# Patient Record
Sex: MALE | Race: BLACK OR AFRICAN AMERICAN | NOT HISPANIC OR LATINO | Employment: OTHER | URBAN - METROPOLITAN AREA
[De-identification: names, ages, dates, MRNs, and addresses within clinical notes are randomized per-mention and may not be internally consistent; named-entity substitution may affect disease eponyms.]

---

## 2017-08-04 ENCOUNTER — OFFICE VISIT (OUTPATIENT)
Dept: FAMILY MEDICINE | Facility: CLINIC | Age: 67
End: 2017-08-04
Payer: COMMERCIAL

## 2017-08-04 VITALS
HEIGHT: 71 IN | TEMPERATURE: 98 F | HEART RATE: 116 BPM | WEIGHT: 218.5 LBS | OXYGEN SATURATION: 96 % | BODY MASS INDEX: 30.59 KG/M2 | DIASTOLIC BLOOD PRESSURE: 82 MMHG | SYSTOLIC BLOOD PRESSURE: 124 MMHG

## 2017-08-04 DIAGNOSIS — M75.82 ROTATOR CUFF TENDONITIS, LEFT: Primary | ICD-10-CM

## 2017-08-04 PROCEDURE — 3008F BODY MASS INDEX DOCD: CPT | Mod: S$GLB,,, | Performed by: NURSE PRACTITIONER

## 2017-08-04 PROCEDURE — 99214 OFFICE O/P EST MOD 30 MIN: CPT | Mod: S$GLB,,, | Performed by: NURSE PRACTITIONER

## 2017-08-04 PROCEDURE — 99999 PR PBB SHADOW E&M-EST. PATIENT-LVL III: CPT | Mod: PBBFAC,,, | Performed by: NURSE PRACTITIONER

## 2017-08-04 RX ORDER — NAPROXEN 500 MG/1
500 TABLET ORAL 2 TIMES DAILY WITH MEALS
Qty: 45 TABLET | Refills: 0 | Status: SHIPPED | OUTPATIENT
Start: 2017-08-04 | End: 2018-10-24

## 2017-08-04 NOTE — PROGRESS NOTES
This dictation has been generated using Dragon Dictation some phonetic errors may occur.     Rod was seen today for shoulder pain.    Diagnoses and all orders for this visit:    Rotator cuff tendonitis, left  -     Ambulatory Referral to Physical/Occupational Therapy    Other orders  -     Cancel: Hepatitis C antibody; Future  -     naproxen (NAPROSYN) 500 MG tablet; Take 1 tablet (500 mg total) by mouth 2 (two) times daily with meals.      Rotator cuff tendinitis on the left.  Add physical therapy.  Naprosyn as above.    Return in about 1 month (around 9/4/2017).      ________________________________________________________________  ________________________________________________________________        Chief Complaint   Patient presents with    Shoulder Pain     History of present illness  This 66 y.o. presents today for complaint of left shoulder pain.  Symptoms have been present for about 2 months.  Patient indicates that he went to sit down at home however his wife called out that he was about to set on her glasses.  Patient indicates that he stuck his arm out to catch himself.  Since that time certain movements exacerbate the pain.  Patient indicated left shoulder pain sometimes affects upper and lower arm and radiates to the chest.  He tried Advil without resolution of his symptoms.  Yesterday he picked up a watermelon and experienced sharp pain.  Pain only lasts for a moment.  It can be quite severe.  Review of systems  No fever or chills  No rash  No chest pain shortness of breath dyspnea on exertion  Denies any neck pain    History reviewed. No pertinent past medical history.    Past Surgical History:   Procedure Laterality Date    NO PAST SURGERIES         History reviewed. No pertinent family history.    Social History     Social History    Marital status:      Spouse name: N/A    Number of children: N/A    Years of education: N/A     Social History Main Topics    Smoking status: Never  Smoker    Smokeless tobacco: Never Used    Alcohol use Yes    Drug use: No    Sexual activity: Not Asked     Other Topics Concern    None     Social History Narrative    None       Current Outpatient Prescriptions   Medication Sig Dispense Refill    naproxen (NAPROSYN) 500 MG tablet Take 1 tablet (500 mg total) by mouth 2 (two) times daily with meals. 45 tablet 0     No current facility-administered medications for this visit.        Review of patient's allergies indicates:  No Known Allergies    Physical examination  Vitals Reviewed  Gen. Well-dressed well-nourished no apparent distress  Skin warm dry and intact.  No rashes noted.  HEENT.  TM intact bilateral with normal light reflex.  No mastoid tenderness during percussion.  Nares patent bilateral.  Pharynx is unremarkable.  No maxillary or frontal sinus tenderness when percussed.    Neck is supple without adenopathy  Chest.  Respirations are even unlabored.  Lungs are clear to auscultation.  Cardiac regular rate and rhythm.  No chest wall adenopathy noted.  Neuro. Awake alert oriented x4.  Normal judgment and cognition noted.  Extremities no clubbing cyanosis or edema noted.  Patient does experience some pain with internal rotation against resistance.  Otherwise unlimited range of motion with external rotation and elevation above the head.  Can tilt test is negative.  With palpation no tenderness over the bicep groove or chest wall.  No supraspinatus tenderness or infraspinatus tenderness.  No palpable spasms.     Call or return to clinic prn if these symptoms worsen or fail to improve as anticipated.

## 2017-08-08 ENCOUNTER — CLINICAL SUPPORT (OUTPATIENT)
Dept: REHABILITATION | Facility: HOSPITAL | Age: 67
End: 2017-08-08
Attending: NURSE PRACTITIONER
Payer: COMMERCIAL

## 2017-08-08 DIAGNOSIS — M25.512 LEFT SHOULDER PAIN, UNSPECIFIED CHRONICITY: Primary | ICD-10-CM

## 2017-08-08 PROCEDURE — 97110 THERAPEUTIC EXERCISES: CPT | Mod: PN | Performed by: PHYSICAL THERAPIST

## 2017-08-08 PROCEDURE — 97161 PT EVAL LOW COMPLEX 20 MIN: CPT | Mod: PN | Performed by: PHYSICAL THERAPIST

## 2017-08-08 NOTE — PROGRESS NOTES
TIME RECORD    Date: 08/08/2017    Start Time:  1400  Stop Time:  1455  Initial eval: 30', therapeutic exercise: 15'      OUTPATIENT PHYSICAL THERAPY   PATIENT EVALUATION    Primary Diagnosis:   1. Left shoulder pain, unspecified chronicity       Treatment Diagnosis: shoulder weakness, poor postural awareness, scapula dyskinesia, decreased ROM  PMH: none  Precautions: standard  Prior Therapy: yes  Medications: Rod Victor has a current medication list which includes the following prescription(s): naproxen.  Nutrition:  Normal  Prior Level of Function: Independent  Social History: , retired   Functional Deficits Leading to Referral/Nature of Injury: difficulty with reaching, lifting  Patient Therapy Goals: to improve his pain    Subjective     Rod Victor states onset of L shoulder pain approximately 2 months ago. He was about to sit on his wife's glasses and reached out to grab the bed to catch himself. He is having intermittent shoulder pain worse with lifting, sudden movements, reaching behind his back. Pain is in the front of his shoulder, tightness in his chest, and side of arm to elbow. History of L shoulder pain and upper back pain. He had physical therapy at [facility]. He is doing the exercises they recommended with the rubber band.     Pain:  Location: anterior shoulder and lateral arm  Description: Aching  Activities Which Increase Pain: Lifting and reaching  Activities Which Decrease Pain: ice and exercise  Pain Scale: 0/10 at best 4/10 now  9/10 at worst    Objective     Posture: protracted scapulae with anterior tilt, forward head  Palpation: TTP L upper traps, infraspinatus, pec minor  Sensation: BUE light touch sensation grossly intact all dermatomes  DTRs: BR 2+ B  Range of Motion/Strength:      Shoulder AROM (PROM)  Left  Right    Flexion:    140/155 155/160     Abduction:    140/145 160/164    ER at side    45  49    Functional reaches:  Internal rotation   L5  L2  External  "rotation   C7  T1    * indicates pain with movement, Measured in degrees    Scapula rotation:    30  40     Measured with inclinometer at medial border of scapula with forward elevation    Cervical Spine Active ROM, measured in degrees with inclinometer, * Indicates pain with movement    Flexion:40  Extension: 45  Left Side Bend:30 (compensatory rotation)  Right Side Bend:20  Left Rotation: 45  Right Rotation:50    MMT:    Left  Right    Shoulder:       Flexion:   4-/5  5/5   Abduction:   4-/5  4+/5  External Rotation:  4-/5  4+/5  Internal Rotation:  3+/5  5/5    Elbow:  Flexion:    4/5  5/5   Extension:   4-/5  5/5  Pronation:   4+/5  5/5  Supination:   4/5  5/5    Wrist:  Flexion:   4+/5  4/5  Extension:   5/5  5/5    Finger:  Abduction   4/5  4/5    Flexibility: tightness noted through pectorals and upper traps  Gait: Without AD  Analysis: Assistance independent  Bed Mobility:Independent  Transfers: Independent  Special Tests:   Lift off test: positive L  Neer's: positive L  hawkin's ev: negative  ULTT: positive L median and ulnar  Spurling's: negative    Treatment: Physical Therapist educated patient in a home exercise program and issued handout. Patient demonstrating good understanding of education provided and able to return demonstration of correct technique. Patient given the following exercises: chin tucks 2 x 10, scapula retractions 2 x 10, corner stretch 5 x 10", rows with RTB, B shoulder extension with RTB, shoulder IR/ER with towel and RTB.     Assessment       Initial Assessment: Rod is a 66 year old male referred to physical therapy for medical diagnosis of rotator cuff tendonitis and onset of acute L shoulder pain. Patient has the following impairments upon initial evaluation: cervical spine and L shoulder decreased AROM/PROM, LUE weakness compared to right, poor postural awareness, decreased soft tissue mobility, and pain. Patient to benefit from skilled physical therapy to address above " deficits and maximize functional independence. Patient appears motivated to improve condition and is a good candidate for physical therapy. Patient has verbalized understanding of plan of care and set goals. Patient has no identified cultural, spiritual, or educational needs that would impair learning.     History  Co-morbidities and personal factors that may impact the plan of care Examination  Body Structures and Functions, activity limitations and participation restrictions that may impact the plan of care Clinical Presentation   Decision Making/ Complexity Score   Co-morbidities:       BMI.            Personal Factors:    Body Regions:neck, shoulder    Body Systems:     Musculoskeletal:  weakness, impaired functional mobility, pain, decreased ROM, impaired joint extensibility and impaired muscle length    Neuromuscular:    Activity limitations: lifting      Participation Restrictions: none         stable and uncomplicated.     Low     CMS Impairment/Limitation/Restriction for FOTO Shoulder Survey  Status Limitation G-Code CMS Severity Modifier  Intake 57% 43% Current Status CK - At least 40 percent but less than 60 percent  Predicted 71% 29% Goal Status+ CJ - At least 20 percent but less than 40 percent    Rehab Potiential: good    Short Term Goals (4 Weeks):   1. Patient to report decreased L shoulder pain by 40% or greater with ADL's  2. Patient's cervical spine AROM to improve 25% or greater all planes for improved functional mobility  3. Patient to demonstrate understanding of proper sitting posture and spinal alignment for management of condition  4. Patient's L scapula upward rotation to improve 20% or greater for improved scapulohumeral rhythm   Long Term Goals (8 Weeks):   1. Patient to have decreased subjective report of disability as noted by a score of 30% or less on the FOTO shoulder questionnaire   2. Patient's LUE strength to improve 1/2 muscle grade for improved functional ability to lift    Plan      Certification Period: 08/08/2017 to 10/08/2017  Recommended Treatment Plan: 2 times per week for 6-8 weeks: Manual Therapy, Moist Heat/ Ice, Neuromuscular Re-ed, Patient Education, Therapeutic Activites, Therapeutic Exercise and Other modalities prn  Other Recommendations: Patient to start home program and told to contact therapist if there are any questions or concerns that arise prior to next scheduled visit      Therapist: Moni Hamm, PT

## 2017-08-09 NOTE — PLAN OF CARE
TIME RECORD    Date: 08/08/2017    Start Time:  1400  Stop Time:  1455  Initial eval: 30', therapeutic exercise: 15'      OUTPATIENT PHYSICAL THERAPY   PATIENT EVALUATION    Primary Diagnosis:   1. Left shoulder pain, unspecified chronicity       Treatment Diagnosis: shoulder weakness, poor postural awareness, scapula dyskinesia, decreased ROM  PMH: none  Precautions: standard  Prior Therapy: yes  Medications: Rod Victor has a current medication list which includes the following prescription(s): naproxen.  Nutrition:  Normal  Prior Level of Function: Independent  Social History: , retired   Functional Deficits Leading to Referral/Nature of Injury: difficulty with reaching, lifting  Patient Therapy Goals: to improve his pain    Subjective     Rod Victor states onset of L shoulder pain approximately 2 months ago. He was about to sit on his wife's glasses and reached out to grab the bed to catch himself. He is having intermittent shoulder pain worse with lifting, sudden movements, reaching behind his back. Pain is in the front of his shoulder, tightness in his chest, and side of arm to elbow. History of L shoulder pain and upper back pain. He had physical therapy at [facility]. He is doing the exercises they recommended with the rubber band.     Pain:  Location: anterior shoulder and lateral arm  Description: Aching  Activities Which Increase Pain: Lifting and reaching  Activities Which Decrease Pain: ice and exercise  Pain Scale: 0/10 at best 4/10 now  9/10 at worst    Objective     Posture: protracted scapulae with anterior tilt, forward head  Palpation: TTP L upper traps, infraspinatus, pec minor  Sensation: BUE light touch sensation grossly intact all dermatomes  DTRs: BR 2+ B  Range of Motion/Strength:      Shoulder AROM (PROM)  Left  Right    Flexion:    140/155 155/160     Abduction:    140/145 160/164    ER at side    45  49    Functional reaches:  Internal rotation   L5  L2  External  "rotation   C7  T1    * indicates pain with movement, Measured in degrees    Scapula rotation:    30  40     Measured with inclinometer at medial border of scapula with forward elevation    Cervical Spine Active ROM, measured in degrees with inclinometer, * Indicates pain with movement    Flexion:40  Extension: 45  Left Side Bend:30 (compensatory rotation)  Right Side Bend:20  Left Rotation: 45  Right Rotation:50    MMT:    Left  Right    Shoulder:       Flexion:   4-/5  5/5   Abduction:   4-/5  4+/5  External Rotation:  4-/5  4+/5  Internal Rotation:  3+/5  5/5    Elbow:  Flexion:    4/5  5/5   Extension:   4-/5  5/5  Pronation:   4+/5  5/5  Supination:   4/5  5/5    Wrist:  Flexion:   4+/5  4/5  Extension:   5/5  5/5    Finger:  Abduction   4/5  4/5    Flexibility: tightness noted through pectorals and upper traps  Gait: Without AD  Analysis: Assistance independent  Bed Mobility:Independent  Transfers: Independent  Special Tests:   Lift off test: positive L  Neer's: positive L  hawkin's ev: negative  ULTT: positive L median and ulnar  Spurling's: negative    Treatment: Physical Therapist educated patient in a home exercise program and issued handout. Patient demonstrating good understanding of education provided and able to return demonstration of correct technique. Patient given the following exercises: chin tucks 2 x 10, scapula retractions 2 x 10, corner stretch 5 x 10", rows with RTB, B shoulder extension with RTB, shoulder IR/ER with towel and RTB.     Assessment       Initial Assessment: Rod is a 66 year old male referred to physical therapy for medical diagnosis of rotator cuff tendonitis and onset of acute L shoulder pain. Patient has the following impairments upon initial evaluation: cervical spine and L shoulder decreased AROM/PROM, LUE weakness compared to right, poor postural awareness, decreased soft tissue mobility, and pain. Patient to benefit from skilled physical therapy to address above " deficits and maximize functional independence. Patient appears motivated to improve condition and is a good candidate for physical therapy. Patient has verbalized understanding of plan of care and set goals. Patient has no identified cultural, spiritual, or educational needs that would impair learning.     History  Co-morbidities and personal factors that may impact the plan of care Examination  Body Structures and Functions, activity limitations and participation restrictions that may impact the plan of care Clinical Presentation   Decision Making/ Complexity Score   Co-morbidities:       BMI.            Personal Factors:    Body Regions:neck, shoulder    Body Systems:     Musculoskeletal:  weakness, impaired functional mobility, pain, decreased ROM, impaired joint extensibility and impaired muscle length    Neuromuscular:    Activity limitations: lifting      Participation Restrictions: none         stable and uncomplicated.     Low     CMS Impairment/Limitation/Restriction for FOTO Shoulder Survey  Status Limitation G-Code CMS Severity Modifier  Intake 57% 43% Current Status CK - At least 40 percent but less than 60 percent  Predicted 71% 29% Goal Status+ CJ - At least 20 percent but less than 40 percent    Rehab Potiential: good    Short Term Goals (4 Weeks):   1. Patient to report decreased L shoulder pain by 40% or greater with ADL's  2. Patient's cervical spine AROM to improve 25% or greater all planes for improved functional mobility  3. Patient to demonstrate understanding of proper sitting posture and spinal alignment for management of condition  4. Patient's L scapula upward rotation to improve 20% or greater for improved scapulohumeral rhythm   Long Term Goals (8 Weeks):   1. Patient to have decreased subjective report of disability as noted by a score of 30% or less on the FOTO shoulder questionnaire   2. Patient's LUE strength to improve 1/2 muscle grade for improved functional ability to lift    Plan      Certification Period: 08/08/2017 to 10/08/2017  Recommended Treatment Plan: 2 times per week for 6-8 weeks: Manual Therapy, Moist Heat/ Ice, Neuromuscular Re-ed, Patient Education, Therapeutic Activites, Therapeutic Exercise and Other modalities prn  Other Recommendations: Patient to start home program and told to contact therapist if there are any questions or concerns that arise prior to next scheduled visit      Therapist: Moni Hamm, PT

## 2017-08-10 ENCOUNTER — CLINICAL SUPPORT (OUTPATIENT)
Dept: REHABILITATION | Facility: HOSPITAL | Age: 67
End: 2017-08-10
Attending: NURSE PRACTITIONER
Payer: COMMERCIAL

## 2017-08-10 DIAGNOSIS — M25.512 LEFT SHOULDER PAIN, UNSPECIFIED CHRONICITY: Primary | ICD-10-CM

## 2017-08-10 PROCEDURE — 97140 MANUAL THERAPY 1/> REGIONS: CPT | Mod: PN | Performed by: PHYSICAL THERAPIST

## 2017-08-10 PROCEDURE — 97110 THERAPEUTIC EXERCISES: CPT | Mod: PN | Performed by: PHYSICAL THERAPIST

## 2017-08-10 NOTE — PROGRESS NOTES
"Name: Rod Victor  [facility] Number: [id number]  Date of Treatment: 08/10/2017   Diagnosis:   Encounter Diagnosis   Name Primary?    Left shoulder pain, unspecified chronicity Yes       Time in: 1255  Time Out: 1350  Total Treatment Time: 55'  Visit #: 2      Subjective:    Rod reports doing the exercises in his home program and less pain noted.  Patient reports their pain to be 0/10 on a 0-10 scale with 0 being no pain and 10 being the worst pain imaginable.    Objective    Rod received therapeutic exercises to develop strength, endurance, ROM, flexibility, posture and core stabilization for 40 minutes including:     UBE 6'    Shoulder Isometrics with arm at 90 elbow flex 30" x 2  Supine shoulder flexion white dowel 2 x 10  chin tucks 2 x 10  scapula retractions 2 x 10  corner stretch 5 x 10"  Serratus punches 1# 3 x 10  Rows with GTB 3 x 10  B shoulder extension with RTB 3 x 10  shoulder ER with towel and RTB 3 x 10  shoulder ER with towel and RTB 3 x 10    Rod received the following manual therapy techniques: Myofacial release and Soft tissue Mobilization were applied to the: suboccipitals, upper traps, rhomboids for 15 minutes.     The patient received 10 min ice to L shoulder    Written Home Exercises Provided: reviewed current home program  Pt demo good understanding of the education provided. Rod demonstrated good return demonstration of activities.     Assessment:     Mr. Velasco tolerated first treatment session well. Exacerbation of pain with corner stretch due to increased neural tension through ulnar n. And ROM modified. Pt with good motivation to improve condition and demonstrating compliance with home program.    Pt will continue to benefit from skilled PT intervention. Medical Necessity is demonstrated by:  Unable to participate in daily activities, Pain limits function of effected part for some activities, Requires skilled supervision to complete and progress HEP and " Weakness.    Patient is making good progress towards established goals.    New/Revised goals: see initial eval on 8/8/17      Plan:  Continue with established Plan of Care towards PT goals.     Moni Hamm, PT

## 2017-08-15 ENCOUNTER — CLINICAL SUPPORT (OUTPATIENT)
Dept: REHABILITATION | Facility: HOSPITAL | Age: 67
End: 2017-08-15
Attending: NURSE PRACTITIONER
Payer: COMMERCIAL

## 2017-08-15 DIAGNOSIS — M25.512 LEFT SHOULDER PAIN, UNSPECIFIED CHRONICITY: Primary | ICD-10-CM

## 2017-08-15 PROCEDURE — 97140 MANUAL THERAPY 1/> REGIONS: CPT | Mod: 59,PN | Performed by: PHYSICAL THERAPIST

## 2017-08-15 PROCEDURE — 97110 THERAPEUTIC EXERCISES: CPT | Mod: PN | Performed by: PHYSICAL THERAPIST

## 2017-08-15 NOTE — PROGRESS NOTES
"Name: Rod Victor  [facility] Number: [id number]  Date of Treatment: 08/15/2017   Diagnosis:   Encounter Diagnosis   Name Primary?    Left shoulder pain, unspecified chronicity Yes       Time in: 1355  Time Out: 1505  Total Treatment Time: 55'  Visit #: 3      Subjective:    Rod reports no pain at rest and some pain in front of left shoulder when reaching out or making quick movements.  Patient reports their pain to be 5/10 on a 0-10 scale with 0 being no pain and 10 being the worst pain imaginable.    Objective    Rod received therapeutic exercises to develop strength, endurance, ROM, flexibility, posture and core stabilization for 40 minutes including:     UBE 6'    Shoulder Isometrics with arm at 90 elbow flex 30" x 2  Supine shoulder flexion white dowel 2 x 10  chin tucks 2 x 10  scapula retractions 2 x 10  corner stretch 5 x 10"  Serratus punches 1# 3 x 10  S/L shoulder ER 2 x 10  Rows with GTB 3 x 10  B shoulder extension with GTB 3 x 10  shoulder ER with towel and RTB 3 x 10  shoulder ER with towel and RTB 3 x 10    Rod received the following manual therapy techniques: Myofacial release and Soft tissue Mobilization were applied to the: suboccipitals, upper traps, rhomboids for 15 minutes.     The patient received 10 min ice to L shoulder    Written Home Exercises Provided: reviewed current home program  Pt demo good understanding of the education provided. Rod demonstrated good return demonstration of activities.     Assessment:     Mr. Velasco tolerated first treatment session well without exacerbation of L shoulder pain. Pt demonstrating compensatory scapula protraction and trunk rotation with resisted shoulder internal rotation. Pt able to correct following verbal cuing and achieve appropriate muscular fatigue.     Pt will continue to benefit from skilled PT intervention. Medical Necessity is demonstrated by:  Unable to participate in daily activities, Pain limits function of effected part " for some activities, Requires skilled supervision to complete and progress HEP and Weakness.    Patient is making good progress towards established goals.    New/Revised goals: see initial eval on 8/8/17      Plan:  Continue with established Plan of Care towards PT goals.     Moni Hamm, PT

## 2017-08-17 ENCOUNTER — CLINICAL SUPPORT (OUTPATIENT)
Dept: REHABILITATION | Facility: HOSPITAL | Age: 67
End: 2017-08-17
Attending: NURSE PRACTITIONER
Payer: COMMERCIAL

## 2017-08-17 DIAGNOSIS — M25.512 LEFT SHOULDER PAIN, UNSPECIFIED CHRONICITY: Primary | ICD-10-CM

## 2017-08-17 PROCEDURE — 97110 THERAPEUTIC EXERCISES: CPT | Mod: PN | Performed by: PHYSICAL THERAPIST

## 2017-08-17 PROCEDURE — 97140 MANUAL THERAPY 1/> REGIONS: CPT | Mod: PN | Performed by: PHYSICAL THERAPIST

## 2017-08-17 NOTE — PROGRESS NOTES
"Name: Rod Victor  Clinic Number: [id number]  Date of Treatment: 08/17/2017   Diagnosis:   Encounter Diagnosis   Name Primary?    Left shoulder pain, unspecified chronicity Yes       Time in: 1330  Time Out: 1445  Total Treatment Time: 60'  Visit #: 4      Subjective:    Rod reports minimal to no pain at rest and moderate pain with activity. Patient reports their pain to be 6/10 on a 0-10 scale with 0 being no pain and 10 being the worst pain imaginable. Pt requests to drop to once a week due to the copayment.     Objective    Rod received therapeutic exercises to develop strength, endurance, ROM, flexibility, posture and core stabilization for 45 minutes including:     UBE 6'    Shoulder Isometrics with arm at 90 elbow flex 30" x 2  Supine shoulder flexion orange dowel 2 x 10  Supine shoulder ER with orange dowel 2 x 10  Supine pull apart and w's 2 x 10 ea RTB  chin tucks 2 x 10  scapula retractions 2 x 10  corner stretch 5 x 10"  Serratus punches 1# 3 x 10  S/L shoulder ER 2 x 10, 1#  Rows with GTB 3 x 10  B shoulder extension with GTB 3 x 10  shoulder ER with towel and RTB 3 x 10  shoulder ER with towel and RTB 3 x 10    Rod received the following manual therapy techniques: Myofacial release and Soft tissue Mobilization were applied to the: suboccipitals, upper traps, rhomboids for 15 minutes.     The patient received 10 min ice to L shoulder    Written Home Exercises Provided: reviewed current home program and issued green TB  Pt demo good understanding of the education provided. Rod demonstrated good return demonstration of activities.     Assessment:     Mr. Velasco tolerated treatment session well  And able to progress periscapular and rotator cuff strengthening without exacerbation of L shoulder pain.   Pt will continue to benefit from skilled PT intervention. Medical Necessity is demonstrated by:  Unable to participate in daily activities, Pain limits function of effected part for some " activities, Requires skilled supervision to complete and progress HEP and Weakness.    Patient is making good progress towards established goals.    New/Revised goals: see initial eval on 8/8/17      Plan:  Continue with established Plan of Care towards PT goals.     Moni Hamm, PT

## 2017-08-23 ENCOUNTER — CLINICAL SUPPORT (OUTPATIENT)
Dept: REHABILITATION | Facility: HOSPITAL | Age: 67
End: 2017-08-23
Attending: NURSE PRACTITIONER
Payer: COMMERCIAL

## 2017-08-23 DIAGNOSIS — M25.512 LEFT SHOULDER PAIN, UNSPECIFIED CHRONICITY: Primary | ICD-10-CM

## 2017-08-23 PROCEDURE — 97110 THERAPEUTIC EXERCISES: CPT | Mod: PN | Performed by: PHYSICAL THERAPIST

## 2017-08-23 PROCEDURE — 97140 MANUAL THERAPY 1/> REGIONS: CPT | Mod: PN | Performed by: PHYSICAL THERAPIST

## 2017-08-23 NOTE — PROGRESS NOTES
"Name: Rod Victor  Clinic Number: [id number]  Date of Treatment: 08/23/2017   Diagnosis:   Encounter Diagnosis   Name Primary?    Left shoulder pain, unspecified chronicity Yes       Time in: 1330  Time Out: 1440  Total Treatment Time: 60'  Visit #: 5      Subjective:    Rod reports continuing to do the exercises at home and his shoulder feels stronger. He still has trouble with the corner stretch. Patient reports their pain to be 6/10 on a 0-10 scale with 0 being no pain and 10 being the worst pain imaginable.     Objective    Rod received therapeutic exercises to develop strength, endurance, ROM, flexibility, posture and core stabilization for 45 minutes including:     UBE 6'    Shoulder Isometrics with arm at 90 elbow flex 30" x 2  Supine shoulder flexion orange dowel 2 x 10  Supine shoulder ER with orange dowel 2 x 10  Supine pull apart and w's 2 x 10 ea RTB  chin tucks 2 x 10  scapula retractions 2 x 10  corner stretch 5 x 10"  Serratus punches 2# 2 x 10  S/L shoulder ER 2 x 10, 2#  Rows with GTB 3 x 10  B shoulder extension with GTB 3 x 10  shoulder ER with towel and RTB 3 x 10  shoulder ER with towel and RTB 3 x 10    Rod received the following manual therapy techniques: Myofacial release and Soft tissue Mobilization were applied to the: suboccipitals, upper traps, rhomboids for 15 minutes.     The patient received 10 min ice to L shoulder    Written Home Exercises Provided: reviewed current home program and issued green TB  Pt demo good understanding of the education provided. Rod demonstrated good return demonstration of activities.     Assessment:     Mr. Velasco tolerated treatment session well without provocation of pain  Pt with noted improvements in strength with increased weight performed for resisted periscapular and rotator cuff strengthening exercises. Pt will continue to benefit from skilled PT intervention. Medical Necessity is demonstrated by:  Unable to participate in daily " activities, Pain limits function of effected part for some activities, Requires skilled supervision to complete and progress HEP and Weakness.    Patient is making good progress towards established goals.    New/Revised goals: see initial eval on 8/8/17      Plan:  Continue with established Plan of Care towards PT goals.     Moni Hamm, PT

## 2017-08-30 ENCOUNTER — CLINICAL SUPPORT (OUTPATIENT)
Dept: REHABILITATION | Facility: HOSPITAL | Age: 67
End: 2017-08-30
Attending: NURSE PRACTITIONER
Payer: COMMERCIAL

## 2017-08-30 DIAGNOSIS — M25.512 LEFT SHOULDER PAIN, UNSPECIFIED CHRONICITY: Primary | ICD-10-CM

## 2017-08-30 PROCEDURE — 97140 MANUAL THERAPY 1/> REGIONS: CPT | Mod: PN | Performed by: PHYSICAL THERAPIST

## 2017-08-30 PROCEDURE — 97110 THERAPEUTIC EXERCISES: CPT | Mod: PN | Performed by: PHYSICAL THERAPIST

## 2017-08-30 NOTE — PROGRESS NOTES
"Name: Rod Victor  Clinic Number: [id number]  Date of Treatment: 08/30/2017   Diagnosis:   Encounter Diagnosis   Name Primary?    Left shoulder pain, unspecified chronicity Yes       Time in: 1330  Time Out: 1440  Total Treatment Time: 60'  Visit #: 6      Subjective:    Rod reports no pain at rest and a little bit of pain through the front of his shoulder with reaching. Patient reports their pain to be 3/10 on a 0-10 scale with 0 being no pain and 10 being the worst pain imaginable.     FOTO: 72    Objective    Rod received therapeutic exercises to develop strength, endurance, ROM, flexibility, posture and core stabilization for 45 minutes including:     UBE 6'    Shoulder Isometrics with arm at 90 elbow flex 30" x 2  Supine shoulder flexion orange dowel 2 x 10  Supine shoulder ER with orange dowel 2 x 10  Supine pull apart and w's 2 x 10 ea GTB  chin tucks 2 x 10  scapula retractions 2 x 10  corner stretch 5 x 10"  Serratus punches 2# 2 x 10  S/L shoulder ER 2 x 10, 2#  S/L shoulder abduction 2 x 10  Rows with GTB 3 x 10  B shoulder extension with GTB 3 x 10  shoulder ER with towel and GTB 3 x 10  shoulder ER with towel and GTB 3 x 10  +Ulnar nerve glide 10 x     Rod received the following manual therapy techniques: Myofacial release and Soft tissue Mobilization were applied to the: suboccipitals, upper traps, rhomboids for 15 minutes.     The patient received 10 min ice to L shoulder    Written Home Exercises Provided: reviewed current home program and issued green TB  Pt demo good understanding of the education provided. Rod demonstrated good return demonstration of activities.     Assessment:     Mr. Velasco tolerated treatment session well. Pt demonstrating improved rotator cuff strength as noted by increased resistance with exercises performed. Pt also progressing with decreased subjective report of disability since initial eval as noted by recent FOTO. Pt will continue to benefit from " skilled PT intervention. Medical Necessity is demonstrated by:  Unable to participate in daily activities, Pain limits function of effected part for some activities, Requires skilled supervision to complete and progress HEP and Weakness.    Patient is making good progress towards established goals.    New/Revised goals: see initial eval on 8/8/17      Plan:  Continue with established Plan of Care towards PT goals.     Moni Hamm, PT

## 2017-09-06 ENCOUNTER — CLINICAL SUPPORT (OUTPATIENT)
Dept: REHABILITATION | Facility: HOSPITAL | Age: 67
End: 2017-09-06
Attending: NURSE PRACTITIONER
Payer: COMMERCIAL

## 2017-09-06 DIAGNOSIS — M25.512 LEFT SHOULDER PAIN, UNSPECIFIED CHRONICITY: Primary | ICD-10-CM

## 2017-09-06 PROCEDURE — 97140 MANUAL THERAPY 1/> REGIONS: CPT | Mod: PN | Performed by: PHYSICAL THERAPIST

## 2017-09-06 PROCEDURE — 97110 THERAPEUTIC EXERCISES: CPT | Mod: PN | Performed by: PHYSICAL THERAPIST

## 2017-09-06 NOTE — PROGRESS NOTES
"Name: Rod Victor  Clinic Number: [id number]  Date of Treatment: 09/06/2017   Diagnosis:   Encounter Diagnosis   Name Primary?    Left shoulder pain, unspecified chronicity Yes       Time in: 1330  Time Out: 1430  Total Treatment Time: 50'  Visit #: 7      Subjective:    Rod reports his shoulder feeling "good" today. He reports his shoulder being 90% better and decreased pain at the base of his neck. Patient reports their pain to be 3/10 on a 0-10 scale with 0 being no pain and 10 being the worst pain imaginable.     Objective    Rod received therapeutic exercises to develop strength, endurance, ROM, flexibility, posture and core stabilization for 40 minutes including:     UBE 6'    Shoulder Isometrics with arm at 90 elbow flex 30" x 2  Supine shoulder flexion orange dowel 2 x 10  Supine shoulder ER with orange dowel 2 x 10  Supine pull apart and w's 2 x 10 ea GTB  chin tucks 2 x 10  scapula retractions 2 x 10  corner stretch 5 x 10"  Serratus punches 2# 2 x 10  S/L shoulder ER 2 x 10, 2#  S/L shoulder abduction 2 x 10  Rows with GTB 3 x 10  B shoulder extension with GTB 3 x 10  shoulder ER with towel and GTB 3 x 10  shoulder ER with towel and GTB 3 x 10  Ulnar nerve glide 10 x     Rod received the following manual therapy techniques: Myofacial release and Soft tissue Mobilization were applied to the: suboccipitals, upper traps, rhomboids for 10 minutes.     The patient received 10 min ice to L shoulder    Written Home Exercises Provided: reviewed current home program and issued green TB  Pt demo good understanding of the education provided. Rod demonstrated good return demonstration of activities.     Assessment:     Mr. Velasco tolerated treatment session well without provocation of pain. Pt with improved soft tissue mobility through L upper traps and rhomboids since last visit. Pt will continue to benefit from skilled PT intervention. Medical Necessity is demonstrated by:  Unable to participate " in daily activities, Pain limits function of effected part for some activities, Requires skilled supervision to complete and progress HEP and Weakness.    Patient is making good progress towards established goals.    New/Revised goals: see initial eval on 8/8/17      Plan:  Continue with established Plan of Care towards PT goals. Plan to reassess next visit    Moni Hamm, PT

## 2017-09-13 ENCOUNTER — CLINICAL SUPPORT (OUTPATIENT)
Dept: REHABILITATION | Facility: HOSPITAL | Age: 67
End: 2017-09-13
Attending: NURSE PRACTITIONER
Payer: COMMERCIAL

## 2017-09-13 DIAGNOSIS — M25.512 LEFT SHOULDER PAIN, UNSPECIFIED CHRONICITY: Primary | ICD-10-CM

## 2017-09-13 PROCEDURE — 97110 THERAPEUTIC EXERCISES: CPT | Mod: PN

## 2017-09-13 PROCEDURE — 97140 MANUAL THERAPY 1/> REGIONS: CPT | Mod: 59,PN

## 2017-09-13 NOTE — PROGRESS NOTES
"Name: Rod Victor  Clinic Number: [id number]  Date of Treatment: 09/13/2017   Diagnosis:   Encounter Diagnosis   Name Primary?    Left shoulder pain, unspecified chronicity Yes     Time in: 1330  Time Out: 1436  Total Treatment Time: 65 minutes (1:1 with PTA duration of treatment session)     Visit #: 8    Subjective:    Rod reports his left shoulder is pain free currently. Patient states he does all of exercises at home and his shoulder doesn't hurt nearly as bad as it did before therapy. Patient reports their pain to be 3/10 on a 0-10 scale with 0 being no pain and 10 being the worst pain imaginable.     Objective    Rod received therapeutic exercises to develop strength, endurance, ROM, flexibility, posture and core stabilization for 40 minutes including:     UBE 6 (forward/backward)     Seated scapular retractions 2x10  +Seated BUE ER (RTB) 2x10, 3 sec hold   Supine shoulder flexion red dowel 2 x 10  Supine shoulder ER with red dowel 2 x 10  chin tucks 2 x 10  Serratus punches 2# 2 x 10  S/L shoulder ER 3 x 10, 2#  S/L shoulder abduction 2 x 10, 2#  S/L gators 2x10   +Prone I's with scap retraction 2x10  +Prone T's with scap retraction 2x10   Corner stretch 5 x 10"  +Ball on wall circles (CW/CCW) 2x10 ea direction   Rows with GTB 3 x 10  B shoulder extension with GTB 3 x 10  shoulder ER with towel and GTB 3 x 10  shoulder ER with towel and GTB 3 x 10  Ulnar nerve glide 10 x     Rod received the following manual therapy techniques: Myofacial release and Soft tissue Mobilization were applied to the: suboccipitals, upper traps, rhomboids for 10 minutes.     The patient received 10 min ice to L shoulder    Written Home Exercises Provided: Issued updated   Pt demo good understanding of the education provided. Rod demonstrated good return demonstration of activities.     Assessment:   Rod tolerated treatment well today. Patient able to progress to prone gale-scapular strengthening exercises today " without pain but heavy tactile and verbal cueing needed for scapular retraction without upper trap activation. Patient with good tissue mobility through left cervical/anterior shoulder muscle groups with no complaints of discomfort with direct palpation today. Patient able to complete all exercises today without complaints of increased left shoulder pain. Pt will continue to benefit from skilled PT intervention. Medical Necessity is demonstrated by:  Unable to participate in daily activities, Pain limits function of effected part for some activities, Requires skilled supervision to complete and progress HEP and Weakness.    Patient is making good progress towards established goals.    New/Revised goals: see initial eval on 8/8/17    Plan:  Continue with established Plan of Care towards PT goals. Plan to reassess next visit.    Dominique Koroma, PTA

## 2017-09-20 ENCOUNTER — CLINICAL SUPPORT (OUTPATIENT)
Dept: REHABILITATION | Facility: HOSPITAL | Age: 67
End: 2017-09-20
Attending: NURSE PRACTITIONER
Payer: COMMERCIAL

## 2017-09-20 DIAGNOSIS — M25.512 LEFT SHOULDER PAIN, UNSPECIFIED CHRONICITY: Primary | ICD-10-CM

## 2017-09-20 PROCEDURE — 97110 THERAPEUTIC EXERCISES: CPT | Mod: PN | Performed by: PHYSICAL THERAPIST

## 2017-09-20 PROCEDURE — 97140 MANUAL THERAPY 1/> REGIONS: CPT | Mod: PN | Performed by: PHYSICAL THERAPIST

## 2017-09-20 NOTE — PROGRESS NOTES
"Discharge/ Daily Note    Name: Rod Victor  Clinic Number: [id number]  Date of Treatment: 09/20/2017   Diagnosis:   Encounter Diagnosis   Name Primary?    Left shoulder pain, unspecified chronicity Yes     Time in: 1330  Time Out: 1440  Total Treatment Time: 60 minutes    Visit #: 9    Subjective:    Rod reports being ready to discharge from physical therapy. He has the rubber band and light weights at home to continue his exercises. Patient reports their pain to be 0/10 on a 0-10 scale with 0 being no pain and 10 being the worst pain imaginable.     FOTO: 16%    Objective    Shoulder AROM (PROM)  Left  Right    Flexion:    155/170 155/165     Abduction:    150/155 160/164    ER at side    48  49    Functional reaches:  Internal rotation   L1  T12  External rotation   T4  T4    * indicates pain with movement, Measured in degrees    Scapula rotation:    40  40     Measured with inclinometer at medial border of scapula with forward elevation    Cervical Spine Active ROM, measured in degrees with inclinometer, * Indicates pain with movement    Flexion:50  Extension: 45  Left Side Bend:30   Right Side Bend:30  Left Rotation: 60  Right Rotation:60    MMT:    Left  Right    Shoulder:       Flexion:   4-/5  5/5   Abduction:   4-/5  4+/5  External Rotation:  4/5  4+/5  Internal Rotation:  4-/5  5/5    Rod received therapeutic exercises to develop strength, endurance, ROM, flexibility, posture and core stabilization for 45 minutes including:     UBE 6 (forward/backward)     Seated scapular retractions 2x10  Seated BUE ER (RTB) 2x10, 3 sec hold   Supine shoulder flexion red dowel 2 x 10  Supine shoulder ER with red dowel 2 x 10  chin tucks 2 x 10  Serratus punches 2# 2 x 10  S/L shoulder ER 3 x 10, 2#  S/L shoulder abduction 2 x 10, 2#  S/L gators 2x10   Prone I's with scap retraction 2x10  Prone T's with scap retraction 2x10   Corner stretch 5 x 10"  Ball on wall circles (CW/CCW) 2x10 ea direction   Rows with GTB 3 " x 10  B shoulder extension with GTB 3 x 10  shoulder ER with towel and GTB 3 x 10  shoulder ER with towel and GTB 3 x 10  Ulnar nerve glide 10 x     Rod received the following manual therapy techniques: Myofacial release and Soft tissue Mobilization were applied to the: suboccipitals, upper traps, rhomboids for 10 minutes.     The patient received 10 min ice to L shoulder    Written Home Exercises Provided: Issued updated   Pt demo good understanding of the education provided. Rod demonstrated good return demonstration of activities.     Assessment:   Rod has made good progress in PT with improved UE strength, shoulder ROM, and posture. Pt reporting decreased subjective report of pain and disability. Pt continues with some weakness in periscapular/rotator cuff muscles and to benefit from continued performance of HEP. Goals partially met.     Short Term Goals (4 Weeks):   1. Patient to report decreased L shoulder pain by 40% or greater with ADL's-met  2. Patient's cervical spine AROM to improve 25% or greater all planes for improved functional mobility-met  3. Patient to demonstrate understanding of proper sitting posture and spinal alignment for management of condition-met  4. Patient's L scapula upward rotation to improve 20% or greater for improved scapulohumeral rhythm -met  Long Term Goals (8 Weeks):   1. Patient to have decreased subjective report of disability as noted by a score of 30% or less on the FOTO shoulder questionnaire - met  2. Patient's LUE strength to improve 1/2 muscle grade for improved functional ability to lift- partially met    Plan:  Patient is discharge from outpatient physical therapy.     Moni Hamm, PT

## 2018-10-24 ENCOUNTER — OFFICE VISIT (OUTPATIENT)
Dept: FAMILY MEDICINE | Facility: CLINIC | Age: 68
End: 2018-10-24
Payer: MEDICARE

## 2018-10-24 VITALS
WEIGHT: 214.06 LBS | TEMPERATURE: 99 F | BODY MASS INDEX: 29.97 KG/M2 | OXYGEN SATURATION: 97 % | SYSTOLIC BLOOD PRESSURE: 140 MMHG | HEIGHT: 71 IN | DIASTOLIC BLOOD PRESSURE: 90 MMHG | HEART RATE: 95 BPM

## 2018-10-24 DIAGNOSIS — N40.0 BENIGN PROSTATIC HYPERPLASIA, UNSPECIFIED WHETHER LOWER URINARY TRACT SYMPTOMS PRESENT: ICD-10-CM

## 2018-10-24 DIAGNOSIS — Z12.12 SCREENING FOR COLORECTAL CANCER: ICD-10-CM

## 2018-10-24 DIAGNOSIS — M75.82 ROTATOR CUFF TENDONITIS, LEFT: ICD-10-CM

## 2018-10-24 DIAGNOSIS — Z12.5 SCREENING FOR PROSTATE CANCER: ICD-10-CM

## 2018-10-24 DIAGNOSIS — R63.5 WEIGHT GAIN: ICD-10-CM

## 2018-10-24 DIAGNOSIS — R03.0 ELEVATED BLOOD PRESSURE READING WITHOUT DIAGNOSIS OF HYPERTENSION: ICD-10-CM

## 2018-10-24 DIAGNOSIS — Z86.010 HISTORY OF COLONIC POLYPS: ICD-10-CM

## 2018-10-24 DIAGNOSIS — E78.5 HYPERLIPIDEMIA, UNSPECIFIED HYPERLIPIDEMIA TYPE: ICD-10-CM

## 2018-10-24 DIAGNOSIS — Z00.00 ROUTINE MEDICAL EXAM: Primary | ICD-10-CM

## 2018-10-24 DIAGNOSIS — H50.9 STRABISMUS: ICD-10-CM

## 2018-10-24 DIAGNOSIS — Z12.11 SCREENING FOR COLORECTAL CANCER: ICD-10-CM

## 2018-10-24 PROBLEM — Z86.0100 HISTORY OF COLONIC POLYPS: Status: ACTIVE | Noted: 2018-10-24

## 2018-10-24 PROCEDURE — 99999 PR PBB SHADOW E&M-EST. PATIENT-LVL III: CPT | Mod: PBBFAC,,, | Performed by: INTERNAL MEDICINE

## 2018-10-24 PROCEDURE — 3008F BODY MASS INDEX DOCD: CPT | Mod: CPTII,,, | Performed by: INTERNAL MEDICINE

## 2018-10-24 PROCEDURE — 99397 PER PM REEVAL EST PAT 65+ YR: CPT | Mod: S$PBB,,, | Performed by: INTERNAL MEDICINE

## 2018-10-24 PROCEDURE — 99213 OFFICE O/P EST LOW 20 MIN: CPT | Mod: PBBFAC,PO | Performed by: INTERNAL MEDICINE

## 2018-10-24 PROCEDURE — 99214 OFFICE O/P EST MOD 30 MIN: CPT | Mod: 25,S$PBB,, | Performed by: INTERNAL MEDICINE

## 2018-10-24 PROCEDURE — 1101F PT FALLS ASSESS-DOCD LE1/YR: CPT | Mod: CPTII,,, | Performed by: INTERNAL MEDICINE

## 2018-10-24 NOTE — PROGRESS NOTES
Chief complaint:  Physical and establish care    67-year-old black male new to me.  Here for his physical.  Last lab was 5 years ago and overdue for PSA.  I did review outside records any appears up-to-date on colon cancer screening having a history of polyps.  Next colonoscopy will be due     ROS:   CONST: weight stable. EYES: no vision change. ENT: no sore throat. CV: no chest pain w/ exertion. RESP: no shortness of breath. GI: no nausea, vomiting, diarrhea. No dysphagia. : no urinary issues. MUSCULOSKELETAL: no new myalgias or arthralgias except left shoulder. SKIN: no new changes. NEURO: no focal deficits. PSYCH: no new issues. ENDOCRINE: no polyuria. HEME: no lymph nodes. ALLERGY: no general pruritis.    Past Medical History:   Diagnosis Date    History of colonic polyps 10/24/2018    adenoma , polyp 2016 at [facility] - 5 yrs    Rotator cuff tendonitis, left 10/24/2018    Strabismus 10/24/2018     Past Surgical History:   Procedure Laterality Date    COLONOSCOPY N/A 2013    Performed by Preston Mercer MD at [facility]    NO PAST SURGERIES         Family history:  Brother with unstated cancer, sister with diabetes, brother with stroke.  Both parents  in their 80s of unstated causes.    Social history:  Retired general maintenance and .  He is  31 years with no prior marriage and has no children.  He drinks occasionally.  He has never smoked.    Gen: no distress  EYES: conjunctiva clear, non-icteric, PERRL  ENT: nose clear, nasal mucosa normal, oropharynx clear and moist, teeth good  NECK:supple, thyroid non-palpable  RESP: effort is good, lungs clear  CV: heart RRR w/o murmur, gallops or rubs; no carotid bruits, no edema  GI: abdomen soft, non-distended, non-tender, no hepatosplenomegaly  MS: gait normal, no clubbing or cyanosis of the digits  SKIN: no rashes, warm to touch  SHOULDER: both arms no defects, impingement on abduction on a left      with reduced ROM and  pain in supraspinatus. Other shoulder has good ROM. Both joints stable. Strength 5/5 both arms but slightly reduced due to pain on affected side.  Some early signs of frozen shoulder on the left but I am.  Able to slowly abduct the arm all the way up  NECK full ROM, no defect, no muscle pain nextVincent was seen today for annual exam, back pain and arm pain.    Diagnoses and all orders for this visit:    Routine medical exam, new to me, up-to-date on colon screening but overdue for labs and PSA  -     Prostate Specific Antigen, Diagnostic; Future  -     TSH; Future  -     CBC auto differential; Future  -     Lipid panel; Future  -     Comprehensive metabolic panel; Future    Screening for prostate cancer  -     Prostate Specific Antigen, Diagnostic; Future    Screening for colorectal cancer    History of colonic polyps  Comments:  adenoma 2013, polyp 2016 at [facility] - 5 yrs                                            Additional evaluation and management issues:    Additionally, patient has other medical issues to address separate from his physical.  He reports that in the past he caught himself with his outstretched left arm going to sit in a chair.  Since that time he had some pain in that left shoulder with abduction.  He was diagnosed with rotator cuff tendinitis and he did therapy.  He has the bands at home.  Things resolved.  Over this past weekend he cut about 175 orange is off a tree and increase the similar pain and impingement in the left shoulder.  Moderate severe any.  Dr. he does have his exercises and we discussed the potential for frozen shoulder and to do other range of motion his PSA having to use a code for BPH but has no current BPH symptoms.  He is gained some weight but nothing extraordinary.  All the separate issues reviewed and patient counseled specifically counseling and giving specific instruction range-of-motion exercises for shoulder.  His evaluation and management will be based upon time  counseling.Total time over 25 minutes with over 50% counseling.            Assessment and plan:            Rotator cuff tendonitis, left, anti-inflammatories, range-of-motion exercises    Benign prostatic hyperplasia, unspecified whether lower urinary tract symptoms present  -     Prostate Specific Antigen, Diagnostic; Future    Hyperlipidemia, unspecified hyperlipidemia type  -     Lipid panel; Future    Elevated blood pressure reading without diagnosis of hypertension, get more readings at home and rule out secondary causes  -     Prostate Specific Antigen, Diagnostic; Future  -     TSH; Future  -     CBC auto differential; Future  -     Lipid panel; Future  -     Comprehensive metabolic panel; Future    Weight gain  -     TSH; Future    Strabismus

## 2018-10-30 ENCOUNTER — LAB VISIT (OUTPATIENT)
Dept: LAB | Facility: HOSPITAL | Age: 68
End: 2018-10-30
Attending: INTERNAL MEDICINE
Payer: MEDICARE

## 2018-10-30 DIAGNOSIS — E78.5 HYPERLIPIDEMIA, UNSPECIFIED HYPERLIPIDEMIA TYPE: ICD-10-CM

## 2018-10-30 DIAGNOSIS — Z12.5 SCREENING FOR PROSTATE CANCER: ICD-10-CM

## 2018-10-30 DIAGNOSIS — R03.0 ELEVATED BLOOD PRESSURE READING WITHOUT DIAGNOSIS OF HYPERTENSION: ICD-10-CM

## 2018-10-30 DIAGNOSIS — R63.5 WEIGHT GAIN: ICD-10-CM

## 2018-10-30 DIAGNOSIS — Z00.00 ROUTINE MEDICAL EXAM: ICD-10-CM

## 2018-10-30 DIAGNOSIS — N40.0 BENIGN PROSTATIC HYPERPLASIA, UNSPECIFIED WHETHER LOWER URINARY TRACT SYMPTOMS PRESENT: ICD-10-CM

## 2018-10-30 LAB
ALBUMIN SERPL BCP-MCNC: 3.8 G/DL
ALP SERPL-CCNC: 85 U/L
ALT SERPL W/O P-5'-P-CCNC: 20 U/L
ANION GAP SERPL CALC-SCNC: 7 MMOL/L
AST SERPL-CCNC: 18 U/L
BASOPHILS # BLD AUTO: 0.06 K/UL
BASOPHILS NFR BLD: 0.8 %
BILIRUB SERPL-MCNC: 0.6 MG/DL
BUN SERPL-MCNC: 14 MG/DL
CALCIUM SERPL-MCNC: 9.4 MG/DL
CHLORIDE SERPL-SCNC: 108 MMOL/L
CHOLEST SERPL-MCNC: 181 MG/DL
CHOLEST/HDLC SERPL: 3.4 {RATIO}
CO2 SERPL-SCNC: 24 MMOL/L
COMPLEXED PSA SERPL-MCNC: 0.74 NG/ML
CREAT SERPL-MCNC: 1.1 MG/DL
DIFFERENTIAL METHOD: ABNORMAL
EOSINOPHIL # BLD AUTO: 0.3 K/UL
EOSINOPHIL NFR BLD: 3.4 %
ERYTHROCYTE [DISTWIDTH] IN BLOOD BY AUTOMATED COUNT: 12.7 %
EST. GFR  (AFRICAN AMERICAN): >60 ML/MIN/1.73 M^2
EST. GFR  (NON AFRICAN AMERICAN): >60 ML/MIN/1.73 M^2
GLUCOSE SERPL-MCNC: 100 MG/DL
HCT VFR BLD AUTO: 45 %
HDLC SERPL-MCNC: 53 MG/DL
HDLC SERPL: 29.3 %
HGB BLD-MCNC: 14.9 G/DL
IMM GRANULOCYTES # BLD AUTO: 0.03 K/UL
IMM GRANULOCYTES NFR BLD AUTO: 0.4 %
LDLC SERPL CALC-MCNC: 115.8 MG/DL
LYMPHOCYTES # BLD AUTO: 2.6 K/UL
LYMPHOCYTES NFR BLD: 32.5 %
MCH RBC QN AUTO: 32.5 PG
MCHC RBC AUTO-ENTMCNC: 33.1 G/DL
MCV RBC AUTO: 98 FL
MONOCYTES # BLD AUTO: 0.7 K/UL
MONOCYTES NFR BLD: 9.2 %
NEUTROPHILS # BLD AUTO: 4.3 K/UL
NEUTROPHILS NFR BLD: 53.7 %
NONHDLC SERPL-MCNC: 128 MG/DL
NRBC BLD-RTO: 0 /100 WBC
PLATELET # BLD AUTO: 220 K/UL
PMV BLD AUTO: 11.4 FL
POTASSIUM SERPL-SCNC: 4 MMOL/L
PROT SERPL-MCNC: 7.4 G/DL
RBC # BLD AUTO: 4.58 M/UL
SODIUM SERPL-SCNC: 139 MMOL/L
TRIGL SERPL-MCNC: 61 MG/DL
TSH SERPL DL<=0.005 MIU/L-ACNC: 2.17 UIU/ML
WBC # BLD AUTO: 7.95 K/UL

## 2018-10-30 PROCEDURE — 84443 ASSAY THYROID STIM HORMONE: CPT

## 2018-10-30 PROCEDURE — 36415 COLL VENOUS BLD VENIPUNCTURE: CPT | Mod: PO

## 2018-10-30 PROCEDURE — 85025 COMPLETE CBC W/AUTO DIFF WBC: CPT

## 2018-10-30 PROCEDURE — 80061 LIPID PANEL: CPT

## 2018-10-30 PROCEDURE — 80053 COMPREHEN METABOLIC PANEL: CPT

## 2018-10-30 PROCEDURE — 84153 ASSAY OF PSA TOTAL: CPT

## 2019-05-16 ENCOUNTER — LAB VISIT (OUTPATIENT)
Dept: LAB | Facility: HOSPITAL | Age: 69
End: 2019-05-16
Payer: MEDICARE

## 2019-05-16 ENCOUNTER — OFFICE VISIT (OUTPATIENT)
Dept: FAMILY MEDICINE | Facility: CLINIC | Age: 69
End: 2019-05-16
Payer: MEDICARE

## 2019-05-16 VITALS
WEIGHT: 217.63 LBS | SYSTOLIC BLOOD PRESSURE: 132 MMHG | HEART RATE: 117 BPM | HEIGHT: 71 IN | DIASTOLIC BLOOD PRESSURE: 80 MMHG | TEMPERATURE: 98 F | BODY MASS INDEX: 30.47 KG/M2 | OXYGEN SATURATION: 96 %

## 2019-05-16 DIAGNOSIS — Z23 NEED FOR SHINGLES VACCINE: ICD-10-CM

## 2019-05-16 DIAGNOSIS — Z11.59 NEED FOR HEPATITIS C SCREENING TEST: ICD-10-CM

## 2019-05-16 DIAGNOSIS — Z23 NEED FOR PNEUMOCOCCAL VACCINATION: ICD-10-CM

## 2019-05-16 DIAGNOSIS — Z00.00 ENCOUNTER FOR PREVENTIVE HEALTH EXAMINATION: Primary | ICD-10-CM

## 2019-05-16 PROCEDURE — 99999 PR PBB SHADOW E&M-EST. PATIENT-LVL IV: CPT | Mod: PBBFAC,,, | Performed by: NURSE PRACTITIONER

## 2019-05-16 PROCEDURE — 90750 HZV VACC RECOMBINANT IM: CPT | Mod: S$GLB,,, | Performed by: NURSE PRACTITIONER

## 2019-05-16 PROCEDURE — 90670 PCV13 VACCINE IM: CPT | Mod: S$GLB,,, | Performed by: NURSE PRACTITIONER

## 2019-05-16 PROCEDURE — 90670 PNEUMOCOCCAL CONJUGATE VACCINE 13-VALENT LESS THAN 5YO & GREATER THAN: ICD-10-PCS | Mod: S$GLB,,, | Performed by: NURSE PRACTITIONER

## 2019-05-16 PROCEDURE — G0009 ADMIN PNEUMOCOCCAL VACCINE: HCPCS | Mod: 59,S$GLB,, | Performed by: NURSE PRACTITIONER

## 2019-05-16 PROCEDURE — 99999 PR PBB SHADOW E&M-EST. PATIENT-LVL IV: ICD-10-PCS | Mod: PBBFAC,,, | Performed by: NURSE PRACTITIONER

## 2019-05-16 PROCEDURE — G0439 PPPS, SUBSEQ VISIT: HCPCS | Mod: S$GLB,,, | Performed by: NURSE PRACTITIONER

## 2019-05-16 PROCEDURE — 90471 IMMUNIZATION ADMIN: CPT | Mod: S$GLB,,, | Performed by: NURSE PRACTITIONER

## 2019-05-16 PROCEDURE — 90471 PNEUMOCOCCAL CONJUGATE VACCINE 13-VALENT LESS THAN 5YO & GREATER THAN: ICD-10-PCS | Mod: S$GLB,,, | Performed by: NURSE PRACTITIONER

## 2019-05-16 PROCEDURE — G0439 PR MEDICARE ANNUAL WELLNESS SUBSEQUENT VISIT: ICD-10-PCS | Mod: S$GLB,,, | Performed by: NURSE PRACTITIONER

## 2019-05-16 PROCEDURE — G0009 ZOSTER RECOMBINANT VACCINE: ICD-10-PCS | Mod: 59,S$GLB,, | Performed by: NURSE PRACTITIONER

## 2019-05-16 PROCEDURE — 90750 ZOSTER RECOMBINANT VACCINE: ICD-10-PCS | Mod: S$GLB,,, | Performed by: NURSE PRACTITIONER

## 2019-05-16 PROCEDURE — 86803 HEPATITIS C AB TEST: CPT

## 2019-05-16 PROCEDURE — 36415 COLL VENOUS BLD VENIPUNCTURE: CPT | Mod: PO

## 2019-05-16 RX ORDER — IBUPROFEN 200 MG
200 TABLET ORAL EVERY 6 HOURS PRN
COMMUNITY
End: 2019-12-18 | Stop reason: ALTCHOICE

## 2019-05-16 NOTE — PATIENT INSTRUCTIONS
Counseling and Referral of Other Preventative  (Italic type indicates deductible and co-insurance are waived)    Patient Name: Rod Victor  Today's Date: 5/16/2019    Health Maintenance       Date Due Completion Date    Hepatitis C Screening 1950 ---    TETANUS VACCINE 10/25/1970 ---    Shingles Vaccine (1 of 2) 10/25/2002 ---    Pneumococcal Vaccine (65+ Low/Medium Risk) (1 of 2 - PCV13) 10/25/2017 ---    Influenza Vaccine 08/01/2019 10/17/2018 (Done)    Override on 10/17/2018: Done    Colonoscopy 05/25/2021 5/25/2016 (Done)    Override on 5/25/2016: Done ([facility]-Preston Mercer MD-Severe diverticulosis of the descending and sigmoid colon, Polyp (3mm) in the ascending colon,Polyp (3mm) in the ascending colon (Poylpectomy),Grade 2 Internal hemorroids-Plan: Colonoscopy in 5 years)    Lipid Panel 10/30/2023 10/30/2018        No orders of the defined types were placed in this encounter.    The following information is provided to all patients.  This information is to help you find resources for any of the problems found today that may be affecting your health:                Living healthy guide: www.[facility].[place].gov      Understanding Diabetes: www.diabetes.org      Eating healthy: www.cdc.gov/healthyweight      CDC home safety checklist: www.cdc.gov/steadi/patient.html      Agency on Aging: www.goea.[place].gov      Alcoholics anonymous (AA): www.aa.org      Physical Activity: www.anne.nih.gov/po3ukrd      Tobacco use: www.quitwithusla.org

## 2019-05-16 NOTE — PROGRESS NOTES
"Rod Victor presented for a  Medicare AWV and comprehensive Health Risk Assessment today. The following components were reviewed and updated:    · Medical history  · Family History  · Social history  · Allergies and Current Medications  · Health Risk Assessment  · Health Maintenance  · Care Team     ** See Completed Assessments for Annual Wellness Visit within the encounter summary.**       The following assessments were completed:  · Living Situation  · CAGE  · Depression Screening  · Timed Get Up and Go  · Whisper Test  · Cognitive Function Screening  · Nutrition Screening  · ADL Screening  · PAQ Screening    Vitals:    05/16/19 1141   BP: 132/80   BP Location: Right arm   Patient Position: Sitting   BP Method: Large (Manual)   Pulse: (!) 117   Temp: 97.7 °F (36.5 °C)   TempSrc: Oral   SpO2: 96%   Weight: 98.7 kg (217 lb 9.5 oz)   Height: 5' 11" (1.803 m)     Body mass index is 30.35 kg/m².  Physical Exam   Constitutional: He is oriented to person, place, and time. He appears well-developed and well-nourished.   HENT:   Head: Normocephalic and atraumatic.   Cardiovascular: Normal rate, regular rhythm and normal heart sounds.   Pulmonary/Chest: Effort normal and breath sounds normal.   Neurological: He is alert and oriented to person, place, and time.   Skin: He is not diaphoretic. No pallor.   Psychiatric: He has a normal mood and affect. His speech is normal and behavior is normal.           Diagnoses and health risks identified today and associated recommendations/orders:    1. Encounter for preventive health examination    2. Need for pneumococcal vaccination  - (In Office Administered) Pneumococcal Conjugate Vaccine (13 Valent) (IM)    3. Need for shingles vaccine  - (In Office Administered) Zoster Recombinant Vaccine    4. Need for hepatitis C screening test  - Hepatitis C antibody; Future      Provided Rod with a 5-10 year written screening schedule and personal prevention plan. Recommendations were " developed using the USPSTF age appropriate recommendations. Education, counseling, and referrals were provided as needed. After Visit Summary printed and given to patient which includes a list of additional screenings\tests needed.    Follow up in about 1 year (around 5/16/2020).    Emerald Cabrera, FNP-C  I offered to discuss end of life issues, including information on how to make advance directives that the patient could use to name someone who would make medical decisions on their behalf if they became too ill to make themselves.    ___Patient declined  _X_Patient is interested, I provided paper work and offered to discuss.

## 2019-05-17 LAB — HCV AB SERPL QL IA: NEGATIVE

## 2019-08-13 ENCOUNTER — CLINICAL SUPPORT (OUTPATIENT)
Dept: FAMILY MEDICINE | Facility: CLINIC | Age: 69
End: 2019-08-13
Payer: MEDICARE

## 2019-08-13 DIAGNOSIS — Z23 NEED FOR ZOSTER VACCINATION: Primary | ICD-10-CM

## 2019-08-13 PROCEDURE — 90750 ZOSTER RECOMBINANT VACCINE: ICD-10-PCS | Mod: S$GLB,,, | Performed by: INTERNAL MEDICINE

## 2019-08-13 PROCEDURE — 90471 ZOSTER RECOMBINANT VACCINE: ICD-10-PCS | Mod: S$GLB,,, | Performed by: INTERNAL MEDICINE

## 2019-08-13 PROCEDURE — 90750 HZV VACC RECOMBINANT IM: CPT | Mod: S$GLB,,, | Performed by: INTERNAL MEDICINE

## 2019-08-13 PROCEDURE — 99499 NO LOS: ICD-10-PCS | Mod: S$GLB,,, | Performed by: INTERNAL MEDICINE

## 2019-08-13 PROCEDURE — 99499 UNLISTED E&M SERVICE: CPT | Mod: S$GLB,,, | Performed by: INTERNAL MEDICINE

## 2019-08-13 PROCEDURE — 90471 IMMUNIZATION ADMIN: CPT | Mod: S$GLB,,, | Performed by: INTERNAL MEDICINE

## 2019-11-11 ENCOUNTER — HOSPITAL ENCOUNTER (EMERGENCY)
Facility: HOSPITAL | Age: 69
Discharge: HOME | End: 2019-11-11
Attending: EMERGENCY MEDICINE
Payer: MEDICARE

## 2019-11-11 VITALS
DIASTOLIC BLOOD PRESSURE: 83 MMHG | BODY MASS INDEX: 29.4 KG/M2 | TEMPERATURE: 98 F | SYSTOLIC BLOOD PRESSURE: 140 MMHG | WEIGHT: 210 LBS | OXYGEN SATURATION: 96 % | RESPIRATION RATE: 18 BRPM | HEART RATE: 73 BPM | HEIGHT: 71 IN

## 2019-11-11 DIAGNOSIS — R10.9 FLANK PAIN: Primary | ICD-10-CM

## 2019-11-11 LAB
ANION GAP SERPL CALC-SCNC: 12 MMOL/L (ref 8–16)
BILIRUB UR QL STRIP: NEGATIVE
BUN SERPL-MCNC: 15 MG/DL (ref 6–30)
CHLORIDE SERPL-SCNC: 103 MMOL/L (ref 95–110)
CLARITY UR: CLEAR
COLOR UR: YELLOW
CREAT SERPL-MCNC: 0.9 MG/DL (ref 0.5–1.4)
GLUCOSE SERPL-MCNC: 86 MG/DL (ref 70–110)
GLUCOSE UR QL STRIP: NEGATIVE
HCT VFR BLD CALC: 47 %PCV (ref 36–54)
HGB UR QL STRIP: NEGATIVE
KETONES UR QL STRIP: NEGATIVE
LEUKOCYTE ESTERASE UR QL STRIP: NEGATIVE
NITRITE UR QL STRIP: NEGATIVE
PH UR STRIP: 5 [PH] (ref 5–8)
POC IONIZED CALCIUM: 1.29 MMOL/L (ref 1.06–1.42)
POC TCO2 (MEASURED): 30 MMOL/L (ref 23–29)
POTASSIUM BLD-SCNC: 4.6 MMOL/L (ref 3.5–5.1)
PROT UR QL STRIP: NEGATIVE
SAMPLE: ABNORMAL
SODIUM BLD-SCNC: 140 MMOL/L (ref 136–145)
SP GR UR STRIP: 1.02 (ref 1–1.03)
URN SPEC COLLECT METH UR: NORMAL
UROBILINOGEN UR STRIP-ACNC: NEGATIVE EU/DL

## 2019-11-11 PROCEDURE — 85014 HEMATOCRIT: CPT

## 2019-11-11 PROCEDURE — 99284 EMERGENCY DEPT VISIT MOD MDM: CPT

## 2019-11-11 PROCEDURE — 99900035 HC TECH TIME PER 15 MIN (STAT)

## 2019-11-11 PROCEDURE — 81003 URINALYSIS AUTO W/O SCOPE: CPT

## 2019-11-11 PROCEDURE — 84295 ASSAY OF SERUM SODIUM: CPT

## 2019-11-11 PROCEDURE — 82330 ASSAY OF CALCIUM: CPT

## 2019-11-11 PROCEDURE — 82565 ASSAY OF CREATININE: CPT

## 2019-11-11 PROCEDURE — 84132 ASSAY OF SERUM POTASSIUM: CPT

## 2019-11-11 RX ORDER — NAPROXEN 500 MG/1
500 TABLET ORAL 2 TIMES DAILY WITH MEALS
Qty: 14 TABLET | Refills: 0 | Status: SHIPPED | OUTPATIENT
Start: 2019-11-11 | End: 2019-11-18

## 2019-11-11 RX ORDER — LIDOCAINE 50 MG/G
1 PATCH TOPICAL DAILY
Qty: 15 PATCH | Refills: 0 | Status: SHIPPED | OUTPATIENT
Start: 2019-11-11 | End: 2019-12-18 | Stop reason: ALTCHOICE

## 2019-11-11 NOTE — ED PROVIDER NOTES
Encounter Date: 11/11/2019    SCRIBE #1 NOTE: I, Joel Arauz, am scribing for, and in the presence of,  Roberto Goins PA-C. I have scribed the following portions of the note - Other sections scribed: HPI, ROS, PE.       History     Chief Complaint   Patient presents with    Flank Pain     left flank pain off and on for 2 weeks. denies dysuria or hematuria, pt states had a gas sensation after changing to almond milk on yesterday     Time seen by provider: 3:48 PM    This is a 69 y.o. male who presents to the ED for evaluation of intermittent left flank pain for 2 weeks. Patient reports the pain as mild discomfort where he feels bloated and gassy. He states that this pain started after he switched from soy milk to whole milk with his morning cereal. Patient states that the pain typically occurs after some time postprandially. He states having an episode of this pain at 1:30 PM today lasting 3-4 minutes, which continually improved whenever he passed gas, with minimal pain on arrival to the ED. Patient reports the last time he had this pain was about 6 days ago. Patient states that the pain always seems to improve whenever he passes gas. The pain is exacerbated with any sort of movement. Patient denies nausea, vomiting, diarrhea, fever, chills, diaphoresis, body aches, chest pain, shortness of breath, dysuria, or other urinary changes. He reports last bowel movement this morning which was normal. Patient has not followed up with his PCP for this issue, and states last being seen by his PCP about 1 year ago for routine checkup. Patient states that he sometimes works out with dumbbells, but denies any other exertion or trauma.         Review of patient's allergies indicates:  No Known Allergies  Past Medical History:   Diagnosis Date    History of colonic polyps 10/24/2018    adenoma 2013, polyp 2016 at [facility] - 5 yrs    Rotator cuff tendonitis, left 10/24/2018    Strabismus 10/24/2018     Past Surgical History:    Procedure Laterality Date    NO PAST SURGERIES       History reviewed. No pertinent family history.  Social History     Tobacco Use    Smoking status: Never Smoker    Smokeless tobacco: Never Used   Substance Use Topics    Alcohol use: Yes     Comment: socially    Drug use: No     Review of Systems   Constitutional: Negative for chills, diaphoresis and fever.   HENT: Negative for congestion and sore throat.    Eyes: Negative for visual disturbance.   Respiratory: Negative for cough and shortness of breath.    Cardiovascular: Negative for chest pain.   Gastrointestinal: Negative for abdominal pain, blood in stool, constipation, diarrhea, nausea and vomiting.   Genitourinary: Positive for flank pain (left flank). Negative for dysuria.   Musculoskeletal: Negative for back pain.        Negative for body aches.    Skin: Negative for rash.   Allergic/Immunologic: Negative for immunocompromised state.   Neurological: Negative for headaches.       Physical Exam     Initial Vitals [11/11/19 1442]   BP Pulse Resp Temp SpO2   (!) 150/89 85 18 98 °F (36.7 °C) 99 %      MAP       --         Physical Exam    Constitutional: He appears well-developed and well-nourished. He is not diaphoretic. He is cooperative.  Non-toxic appearance. No distress.   HENT:   Head: Normocephalic and atraumatic.   Mouth/Throat: Oropharynx is clear and moist.   Eyes: EOM are normal. Pupils are equal, round, and reactive to light.   Neck: Normal range of motion. Neck supple.   Cardiovascular: Normal rate and regular rhythm. Exam reveals no gallop and no friction rub.    No murmur heard.  Pulmonary/Chest: Effort normal and breath sounds normal. No respiratory distress.   Abdominal: Soft. Bowel sounds are normal. There is no tenderness. There is no CVA tenderness.   Musculoskeletal: Normal range of motion. He exhibits no edema.   Neurological: He is alert and oriented to person, place, and time.   Skin: Skin is warm, dry and intact.   Psychiatric:  He has a normal mood and affect.         ED Course   Procedures  Labs Reviewed   ISTAT PROCEDURE - Abnormal; Notable for the following components:       Result Value    POC TCO2 (MEASURED) 30 (*)     All other components within normal limits   URINALYSIS, REFLEX TO URINE CULTURE    Narrative:     Preferred Collection Type->Urine, Clean Catch          Imaging Results    None          Medical Decision Making:   Clinical Tests:   Lab Tests: Ordered and Reviewed  ED Management:  Hemodynamically stable. Non-toxic and in no acute distress. Very well appearing, pleasant, conversational. Currently asymptomatic without complaint. Symptoms sound most consistent with MSK strain or spasm. Less likely renal colic. Will check UA and basic labs. Labs and UA unremarkable. Will d/c with prescription of naproxen and lidoderm patches. F/u with PCP. Return instructions given. Pt verbalizes understanding and is agreeable with plan.             Scribe Attestation:   Scribe #1: I performed the above scribed service and the documentation accurately describes the services I performed. I attest to the accuracy of the note.                          Clinical Impression:       ICD-10-CM ICD-9-CM   1. Flank pain R10.9 789.09         Disposition:   Disposition: Discharged  Condition: Stable                 I, Roberto Goins, personally performed the services described in this documentation. All medical record entries made by the scribe were at my direction and in my presence.  I have reviewed the chart and agree that the record reflects my personal performance and is accurate and complete.       Roberto Goins PA-C  11/13/19 9812

## 2019-11-11 NOTE — DISCHARGE INSTRUCTIONS
Please take new medication as directed. Please make sure to follow up with your PCP in the next few days to discuss today's Emergency Department visit and for further evaluation and management. Please return to the Emergency Department if your symptoms worsen or you develop any additional concerning symptoms.

## 2019-11-11 NOTE — ED NOTES
Pt. Reports he give urine in triage. Noted only 10 cc in cup. Another cup for urine collection was given to pt. Along with 2 cups of water.

## 2019-11-20 ENCOUNTER — OFFICE VISIT (OUTPATIENT)
Dept: FAMILY MEDICINE | Facility: CLINIC | Age: 69
End: 2019-11-20
Payer: MEDICARE

## 2019-11-20 ENCOUNTER — LAB VISIT (OUTPATIENT)
Dept: LAB | Facility: HOSPITAL | Age: 69
End: 2019-11-20
Attending: INTERNAL MEDICINE
Payer: MEDICARE

## 2019-11-20 VITALS
WEIGHT: 216.5 LBS | TEMPERATURE: 99 F | BODY MASS INDEX: 30.31 KG/M2 | SYSTOLIC BLOOD PRESSURE: 131 MMHG | HEIGHT: 71 IN | HEART RATE: 94 BPM | OXYGEN SATURATION: 96 % | DIASTOLIC BLOOD PRESSURE: 80 MMHG

## 2019-11-20 DIAGNOSIS — R03.0 ELEVATED BLOOD PRESSURE READING WITHOUT DIAGNOSIS OF HYPERTENSION: ICD-10-CM

## 2019-11-20 DIAGNOSIS — R10.9 LEFT FLANK PAIN: ICD-10-CM

## 2019-11-20 DIAGNOSIS — N40.0 BENIGN PROSTATIC HYPERPLASIA, UNSPECIFIED WHETHER LOWER URINARY TRACT SYMPTOMS PRESENT: ICD-10-CM

## 2019-11-20 DIAGNOSIS — E73.9 LACTOSE INTOLERANCE: ICD-10-CM

## 2019-11-20 DIAGNOSIS — Z12.5 SCREENING FOR PROSTATE CANCER: ICD-10-CM

## 2019-11-20 DIAGNOSIS — Z86.010 HISTORY OF COLONIC POLYPS: ICD-10-CM

## 2019-11-20 DIAGNOSIS — Z00.00 ROUTINE MEDICAL EXAM: Primary | ICD-10-CM

## 2019-11-20 DIAGNOSIS — Z00.00 ROUTINE MEDICAL EXAM: ICD-10-CM

## 2019-11-20 LAB
ALBUMIN SERPL BCP-MCNC: 3.9 G/DL (ref 3.5–5.2)
ALP SERPL-CCNC: 91 U/L (ref 55–135)
ALT SERPL W/O P-5'-P-CCNC: 19 U/L (ref 10–44)
ANION GAP SERPL CALC-SCNC: 8 MMOL/L (ref 8–16)
AST SERPL-CCNC: 24 U/L (ref 10–40)
BASOPHILS # BLD AUTO: 0.04 K/UL (ref 0–0.2)
BASOPHILS NFR BLD: 0.6 % (ref 0–1.9)
BILIRUB SERPL-MCNC: 0.6 MG/DL (ref 0.1–1)
BUN SERPL-MCNC: 14 MG/DL (ref 8–23)
CALCIUM SERPL-MCNC: 9.2 MG/DL (ref 8.7–10.5)
CHLORIDE SERPL-SCNC: 107 MMOL/L (ref 95–110)
CO2 SERPL-SCNC: 26 MMOL/L (ref 23–29)
COMPLEXED PSA SERPL-MCNC: 0.7 NG/ML (ref 0–4)
CREAT SERPL-MCNC: 1 MG/DL (ref 0.5–1.4)
DIFFERENTIAL METHOD: ABNORMAL
EOSINOPHIL # BLD AUTO: 0.2 K/UL (ref 0–0.5)
EOSINOPHIL NFR BLD: 3.5 % (ref 0–8)
ERYTHROCYTE [DISTWIDTH] IN BLOOD BY AUTOMATED COUNT: 12.7 % (ref 11.5–14.5)
EST. GFR  (AFRICAN AMERICAN): >60 ML/MIN/1.73 M^2
EST. GFR  (NON AFRICAN AMERICAN): >60 ML/MIN/1.73 M^2
GLUCOSE SERPL-MCNC: 99 MG/DL (ref 70–110)
HCT VFR BLD AUTO: 44.3 % (ref 40–54)
HGB BLD-MCNC: 14.7 G/DL (ref 14–18)
IMM GRANULOCYTES # BLD AUTO: 0.01 K/UL (ref 0–0.04)
IMM GRANULOCYTES NFR BLD AUTO: 0.2 % (ref 0–0.5)
LYMPHOCYTES # BLD AUTO: 1.3 K/UL (ref 1–4.8)
LYMPHOCYTES NFR BLD: 20.9 % (ref 18–48)
MCH RBC QN AUTO: 33.3 PG (ref 27–31)
MCHC RBC AUTO-ENTMCNC: 33.2 G/DL (ref 32–36)
MCV RBC AUTO: 100 FL (ref 82–98)
MONOCYTES # BLD AUTO: 0.7 K/UL (ref 0.3–1)
MONOCYTES NFR BLD: 10.7 % (ref 4–15)
NEUTROPHILS # BLD AUTO: 4 K/UL (ref 1.8–7.7)
NEUTROPHILS NFR BLD: 64.1 % (ref 38–73)
NRBC BLD-RTO: 0 /100 WBC
PLATELET # BLD AUTO: 203 K/UL (ref 150–350)
PMV BLD AUTO: 11.9 FL (ref 9.2–12.9)
POTASSIUM SERPL-SCNC: 4.2 MMOL/L (ref 3.5–5.1)
PROT SERPL-MCNC: 7.4 G/DL (ref 6–8.4)
RBC # BLD AUTO: 4.42 M/UL (ref 4.6–6.2)
SODIUM SERPL-SCNC: 141 MMOL/L (ref 136–145)
WBC # BLD AUTO: 6.28 K/UL (ref 3.9–12.7)

## 2019-11-20 PROCEDURE — 99397 PER PM REEVAL EST PAT 65+ YR: CPT | Mod: S$GLB,,, | Performed by: INTERNAL MEDICINE

## 2019-11-20 PROCEDURE — 99214 OFFICE O/P EST MOD 30 MIN: CPT | Mod: 25,S$GLB,, | Performed by: INTERNAL MEDICINE

## 2019-11-20 PROCEDURE — 1159F MED LIST DOCD IN RCRD: CPT | Mod: S$GLB,,, | Performed by: INTERNAL MEDICINE

## 2019-11-20 PROCEDURE — 99999 PR PBB SHADOW E&M-EST. PATIENT-LVL III: CPT | Mod: PBBFAC,,, | Performed by: INTERNAL MEDICINE

## 2019-11-20 PROCEDURE — 36415 COLL VENOUS BLD VENIPUNCTURE: CPT | Mod: PO

## 2019-11-20 PROCEDURE — 1101F PR PT FALLS ASSESS DOC 0-1 FALLS W/OUT INJ PAST YR: ICD-10-PCS | Mod: CPTII,S$GLB,, | Performed by: INTERNAL MEDICINE

## 2019-11-20 PROCEDURE — 1125F AMNT PAIN NOTED PAIN PRSNT: CPT | Mod: S$GLB,,, | Performed by: INTERNAL MEDICINE

## 2019-11-20 PROCEDURE — 84153 ASSAY OF PSA TOTAL: CPT

## 2019-11-20 PROCEDURE — 80053 COMPREHEN METABOLIC PANEL: CPT

## 2019-11-20 PROCEDURE — 99397 PR PREVENTIVE VISIT,EST,65 & OVER: ICD-10-PCS | Mod: S$GLB,,, | Performed by: INTERNAL MEDICINE

## 2019-11-20 PROCEDURE — 99214 PR OFFICE/OUTPT VISIT, EST, LEVL IV, 30-39 MIN: ICD-10-PCS | Mod: 25,S$GLB,, | Performed by: INTERNAL MEDICINE

## 2019-11-20 PROCEDURE — 85025 COMPLETE CBC W/AUTO DIFF WBC: CPT

## 2019-11-20 PROCEDURE — 1125F PR PAIN SEVERITY QUANTIFIED, PAIN PRESENT: ICD-10-PCS | Mod: S$GLB,,, | Performed by: INTERNAL MEDICINE

## 2019-11-20 PROCEDURE — 1101F PT FALLS ASSESS-DOCD LE1/YR: CPT | Mod: CPTII,S$GLB,, | Performed by: INTERNAL MEDICINE

## 2019-11-20 PROCEDURE — 1159F PR MEDICATION LIST DOCUMENTED IN MEDICAL RECORD: ICD-10-PCS | Mod: S$GLB,,, | Performed by: INTERNAL MEDICINE

## 2019-11-20 PROCEDURE — 99999 PR PBB SHADOW E&M-EST. PATIENT-LVL III: ICD-10-PCS | Mod: PBBFAC,,, | Performed by: INTERNAL MEDICINE

## 2019-11-20 NOTE — PROGRESS NOTES
Chief complaint:  Physical     67-year-old black male new to me 10/18.  Here for his physical.  due for PSA.  I did review outside records any appears up-to-date on colon cancer screening having a history of polyps.  Next colonoscopy will be due .      ROS:   CONST: weight stable. EYES: no vision change. ENT: no sore throat. CV: no chest pain w/ exertion. RESP: no shortness of breath. GI: no nausea, vomiting, diarrhea. No dysphagia. : no urinary issues. MUSCULOSKELETAL: no new myalgias or arthralgias except left shoulder. SKIN: no new changes. NEURO: no focal deficits. PSYCH: no new issues. ENDOCRINE: no polyuria. HEME: no lymph nodes. ALLERGY: no general pruritis.    Past Medical History:   Diagnosis Date    History of colonic polyps 10/24/2018    adenoma , polyp 2016 at [facility] - 5 yrs    Rotator cuff tendonitis, left 10/24/2018    Strabismus 10/24/2018    Probable lactose intolerance  Elevated blood pressure recording in the past    Past Surgical History:   Procedure Laterality Date    NO PAST SURGERIES         Family history:  Brother with unstated cancer, sister with diabetes, brother with stroke.  Both parents  in their 80s of unstated causes.    Social history:  Retired general maintenance and .  He is  31 years with no prior marriage and has no children.  He drinks occasionally.  He has never smoked.    Gen: no distress  EYES: conjunctiva clear, non-icteric, PERRL  ENT: nose clear, nasal mucosa normal, oropharynx clear and moist, teeth good  NECK:supple, thyroid non-palpable  RESP: effort is good, lungs clear  CV: heart RRR w/o murmur, gallops or rubs; no carotid bruits, no edema  GI: abdomen soft, non-distended, non-tender, no hepatosplenomegaly  MS: gait normal, no clubbing or cyanosis of the digits  SKIN: no rashes, warm to touch        Diagnoses and all orders for this visit:    Routine medical exam,  up-to-date on colon screening but due for labs and PSA  -     Prostate  Specific Antigen, Diagnostic; Future  -     TSH; Future  -     CBC auto differential; Future  -     Lipid panel; Future  -     Comprehensive metabolic panel; Future    Screening for prostate cancer  -     Prostate Specific Antigen, Diagnostic; Future      History of colonic polyps  Comments:  adenoma 2013, polyp 2016 at [facility] - 5 yrs                                            Additional evaluation and management issues:    Additionally, patient has other medical issues to address separate from his physical.  Labs reviewed from last year.  Lipids normal so we will check every couple of years.  He is due for labs and PSA.  His insurance will not except checking TSH.  We will use [facility] to check his PSA. For years he did so e-mail Co.  He then switched to home and mail put was also eating regular ice cream.  He has had gas and pain in the left flank.  It was not positional or muscular.  He has had no change in bowels or diarrhea or bleeding.  Symptoms relieved when he passes gas.  We discussed lactose intolerance at length and to go back to non dairy products and assess response.  He is up-to-date on colon screening.  1 day while driving wearing his seatbelt he had more acute onset of the pain and that is what prompted him to go to the emergency room.  He was given an anti-inflammatory but now her felt it was severe enough to use that medication       All the separate issues reviewed and patient counseled specifically counseling .  His evaluation and management will be based upon time counseling.Total time over 25 minutes with over 50% counseling.    Recent ER visit reviewed  This is a 69 y.o. male who presents to the ED for evaluation of intermittent left flank pain for 2 weeks. Patient reports the pain as mild discomfort where he feels bloated and gassy. He states that this pain started after he switched from soy milk to whole milk with his morning cereal. Patient states that the pain typically occurs after some time  "postprandially. He states having an episode of this pain at 1:30 PM today lasting 3-4 minutes, which continually improved whenever he passed gas, with minimal pain on arrival to the ED. Patient reports the last time he had this pain was about 6 days ago. Patient states that the pain always seems to improve whenever he passes gas. The pain is exacerbated with any sort of movement. Patient denies nausea, vomiting, diarrhea, fever, chills, diaphoresis, body aches, chest pain, shortness of breath, dysuria, or other urinary changes. He reports last bowel movement this morning which was normal. Patient has not followed up with his PCP for this issue, and states last being seen by his PCP about 1 year ago for routine checkup. Patient states that he sometimes works out with dumbbells, but denies any other exertion or trauma.        Assessment and plan:    Left flank pain, clinically consistent with gas and historical information of using dairy products would be consistent with lactose intolerance and he will assess response to avoidance.  No worrisome signs or symptoms indicate any need to investigate any intra-abdominal process, muscular process, kidney stone etc.  Urinalysis done in the ER negative.    Lactose intolerance    Elevated blood pressure reading without diagnosis of hypertension, improved at this visit, continue to monitor  -     Prostate Specific Antigen, Diagnostic; Future  -     CBC auto differential; Future  -     Comprehensive metabolic panel; Future    Benign prostatic hyperplasia, unspecified whether lower urinary tract symptoms present  -     Prostate Specific Antigen, Diagnostic; Future               Clinical will be sensitive so as to not cause confusion regarding evaluation and management issues"This note will not be shared with the patient."  "

## 2019-12-18 ENCOUNTER — HOSPITAL ENCOUNTER (OUTPATIENT)
Dept: RADIOLOGY | Facility: HOSPITAL | Age: 69
Discharge: HOME | End: 2019-12-18
Attending: INTERNAL MEDICINE
Payer: MEDICARE

## 2019-12-18 ENCOUNTER — OFFICE VISIT (OUTPATIENT)
Dept: FAMILY MEDICINE | Facility: CLINIC | Age: 69
End: 2019-12-18
Payer: MEDICARE

## 2019-12-18 VITALS
TEMPERATURE: 98 F | DIASTOLIC BLOOD PRESSURE: 80 MMHG | WEIGHT: 211 LBS | HEART RATE: 104 BPM | OXYGEN SATURATION: 96 % | HEIGHT: 71 IN | SYSTOLIC BLOOD PRESSURE: 136 MMHG | BODY MASS INDEX: 29.54 KG/M2

## 2019-12-18 DIAGNOSIS — K57.90 DIVERTICULOSIS: Primary | ICD-10-CM

## 2019-12-18 DIAGNOSIS — R10.32 LLQ PAIN: ICD-10-CM

## 2019-12-18 PROCEDURE — 99213 OFFICE O/P EST LOW 20 MIN: CPT | Mod: S$GLB,,, | Performed by: INTERNAL MEDICINE

## 2019-12-18 PROCEDURE — 99213 PR OFFICE/OUTPT VISIT, EST, LEVL III, 20-29 MIN: ICD-10-PCS | Mod: S$GLB,,, | Performed by: INTERNAL MEDICINE

## 2019-12-18 PROCEDURE — 74018 RADEX ABDOMEN 1 VIEW: CPT | Mod: TC,FY,PO

## 2019-12-18 PROCEDURE — 99999 PR PBB SHADOW E&M-EST. PATIENT-LVL IV: CPT | Mod: PBBFAC,,, | Performed by: INTERNAL MEDICINE

## 2019-12-18 PROCEDURE — 74018 XR ABDOMEN AP 1 VIEW: ICD-10-PCS | Mod: 26,,, | Performed by: RADIOLOGY

## 2019-12-18 PROCEDURE — 99999 PR PBB SHADOW E&M-EST. PATIENT-LVL IV: ICD-10-PCS | Mod: PBBFAC,,, | Performed by: INTERNAL MEDICINE

## 2019-12-18 PROCEDURE — 74018 RADEX ABDOMEN 1 VIEW: CPT | Mod: 26,,, | Performed by: RADIOLOGY

## 2019-12-18 NOTE — PROGRESS NOTES
This note was created by combination of typed  and M-Modal dictation.  Transcription errors may be present.  If there are any questions, please contact me.    Assessment & Plan:   Diverticulosis  LLQ pain  -mild TTP today. Notes it was worse at night. Afebrile. Diverticulosis on colonoscopy 2016.  Discussed that I think this is diverticulosis, possible this could be diverticulitis, without current pain and fever hold on CT imaging. Check XR look for constipation. If constipated, he can take miralax in addition to the metamucil, if not, can stay with metamucil only.  But discussed that he should increase dietary fiber intake and may not need to take metamucil long term if he increases fiber intake with food.  If sx worsen/increase in frequency/intensity/duration - call - CT abd/pelvis with contrast  -     X-Ray Abdomen AP 1 View; Future; Expected date: 12/18/2019    Medications Discontinued During This Encounter   Medication Reason    ibuprofen (ADVIL,MOTRIN) 200 MG tablet Therapy completed    lidocaine (LIDODERM) 5 % Therapy completed       meds sent this encounter:       Follow Up: No follow-ups on file.    Subjective:     Chief Complaint   Patient presents with    Gas    Abdominal Pain     left side       WALKER Velasco is a 69 y.o. male, last appointment with this clinic was 11/20/2019.    Comes in today with maybe a month or even 2 months of recurrent left lower quadrant area pain.  Feels like it is relieved with passing gas or with a bowel movement.  But feels like he is constipated, feels incomplete void sensation.  Last night it flared up.  Today it is not as bad.  Pointing towards the left lower quadrant area.  He went to the emergency room November 11th for this, he was evaluated for urine source and it was negative.  He has tried changing his diet, with less dairy and more almond milk.  He eats salads twice a week maybe.  He started taking Metamucil to see if that can help with the sensation of  incomplete void/evacuation, for the past 4 days.  Maybe some modest improvement in his overall bowel movements but still sensation of incomplete evacuation.  Colonoscopy done 2016 with diverticulosis, severe, in the descending colon, with a polyp in the ascending colon.    Patient without systemic fevers or chills.  Normal urination.    Answers for HPI/ROS submitted by the patient on 12/18/2019   Abdominal pain  Chronicity: recurrent  Onset: more than 1 month ago  Onset quality: sudden  Frequency: intermittently  Episode duration: 1 hours  Progression since onset: gradually improving  Pain location: left flank  Pain - numeric: 7/10  Pain quality: burning  Radiates to: left flank  anorexia: No  arthralgias: No  belching: Yes  flatus: Yes  hematochezia: No  Aggravated by: certain positions  Relieved by: being still, belching, passing flatus  Diagnostic workup: lower endoscopy  Pain severity: moderate  Treatments tried: antacids  Improvement on treatment: moderate  abdominal surgery: No  colon cancer: No  Crohn's disease: No  gallstones: No  GERD: No  irritable bowel syndrome: No  kidney stones: No  pancreatitis: No  PUD: No  ulcerative colitis: No  UTI: No      Patient Care Team:  Scot Thao MD as PCP - General (Internal Medicine)    Patient Active Problem List    Diagnosis Date Noted    History of colonic polyps 10/24/2018     adenoma 2013      Strabismus 10/24/2018    Rotator cuff tendonitis, left 10/24/2018    Left shoulder pain 09/13/2017       PAST MEDICAL HISTORY:  Past Medical History:   Diagnosis Date    History of colonic polyps 10/24/2018    adenoma 2013, polyp 2016 at [facility] - 5 yrs    Rotator cuff tendonitis, left 10/24/2018    Strabismus 10/24/2018       PAST SURGICAL HISTORY:  Past Surgical History:   Procedure Laterality Date    NO PAST SURGERIES         SOCIAL HISTORY:  Social History     Socioeconomic History    Marital status:      Spouse name: Not on file    Number of  children: Not on file    Years of education: Not on file    Highest education level: Not on file   Occupational History    Not on file   Social Needs    Financial resource strain: Not hard at all    Food insecurity:     Worry: Never true     Inability: Never true    Transportation needs:     Medical: No     Non-medical: No   Tobacco Use    Smoking status: Never Smoker    Smokeless tobacco: Never Used   Substance and Sexual Activity    Alcohol use: Yes     Frequency: Monthly or less     Drinks per session: Patient refused     Binge frequency: Never     Comment: socially    Drug use: No    Sexual activity: Yes     Partners: Female   Lifestyle    Physical activity:     Days per week: 2 days     Minutes per session: 30 min    Stress: Not at all   Relationships    Social connections:     Talks on phone: Three times a week     Gets together: Twice a week     Attends Buddhist service: Not on file     Active member of club or organization: Yes     Attends meetings of clubs or organizations: Never     Relationship status:    Other Topics Concern    Not on file   Social History Narrative    Not on file       ALLERGIES AND MEDICATIONS: updated and reviewed.  Review of patient's allergies indicates:  No Known Allergies  Current Outpatient Medications   Medication Sig Dispense Refill    FLUZONE HIGH-DOSE 2019-20, PF, 180 mcg/0.5 mL Syrg ADM 0.5ML IM UTD  0    ibuprofen (ADVIL,MOTRIN) 200 MG tablet Take 200 mg by mouth every 6 (six) hours as needed for Pain.      lidocaine (LIDODERM) 5 % Place 1 patch onto the skin once daily. Remove & Discard patch within 12 hours or as directed by MD 15 patch 0     No current facility-administered medications for this visit.        Review of Systems   Constitutional: Positive for weight loss. Negative for fever.   Gastrointestinal: Positive for constipation. Negative for diarrhea, melena, nausea and vomiting.   Genitourinary: Positive for frequency. Negative for  "dysuria and hematuria.   Musculoskeletal: Negative for myalgias.   Neurological: Negative for headaches.       Objective:   Physical Exam   Vitals:    12/18/19 1330 12/18/19 1406   BP: (!) 150/86 136/80   BP Location: Right arm Left arm   Patient Position: Sitting Sitting   BP Method: Medium (Manual) Large (Manual)   Pulse: 104    Temp: 98.2 °F (36.8 °C)    TempSrc: Oral    SpO2: 96%    Weight: 95.7 kg (211 lb)    Height: 5' 11" (1.803 m)     Body mass index is 29.43 kg/m².  Weight: 95.7 kg (211 lb)   Height: 5' 11" (180.3 cm)     Physical Exam   Constitutional: He is oriented to person, place, and time. He appears well-developed and well-nourished. No distress.   Eyes: EOM are normal.   Cardiovascular: Normal rate, regular rhythm and normal heart sounds.   No murmur heard.  Pulmonary/Chest: Effort normal and breath sounds normal.   Abdominal:   Pointing towards the left lower quadrant as area pain, on moderate pressure palpation denies significant discomfort.  Flank areas no pain   Musculoskeletal: Normal range of motion.   Neurological: He is alert and oriented to person, place, and time. Coordination normal.   Skin: Skin is warm and dry.   Psychiatric: He has a normal mood and affect. His behavior is normal. Thought content normal.     "

## 2019-12-19 NOTE — PROGRESS NOTES
Prominent stool in the colon.  No dilated loops of small bowel.  Lung bases are clear.  No gross evidence of free air.  Hx of diverticulosis. No fever nontender on exam. Did not start an antibiotic.  Results to pt via my [facility]. Increase bowel regimen intensity. If persisting, worsening - CT abd/pelvis with contrast to evaluate for diverticulitis

## 2019-12-31 ENCOUNTER — HOSPITAL ENCOUNTER (EMERGENCY)
Facility: HOSPITAL | Age: 69
Discharge: HOME | End: 2019-12-31
Attending: EMERGENCY MEDICINE
Payer: MEDICARE

## 2019-12-31 VITALS
RESPIRATION RATE: 18 BRPM | BODY MASS INDEX: 28.56 KG/M2 | WEIGHT: 204 LBS | SYSTOLIC BLOOD PRESSURE: 177 MMHG | DIASTOLIC BLOOD PRESSURE: 92 MMHG | OXYGEN SATURATION: 98 % | HEART RATE: 82 BPM | TEMPERATURE: 98 F | HEIGHT: 71 IN

## 2019-12-31 DIAGNOSIS — S39.011A ABDOMINAL WALL STRAIN, INITIAL ENCOUNTER: Primary | ICD-10-CM

## 2019-12-31 PROCEDURE — 25000003 PHARM REV CODE 250: Performed by: EMERGENCY MEDICINE

## 2019-12-31 PROCEDURE — 99282 EMERGENCY DEPT VISIT SF MDM: CPT | Mod: ,,, | Performed by: EMERGENCY MEDICINE

## 2019-12-31 PROCEDURE — 99283 EMERGENCY DEPT VISIT LOW MDM: CPT

## 2019-12-31 PROCEDURE — 99282 PR EMERGENCY DEPT VISIT,LEVEL II: ICD-10-PCS | Mod: ,,, | Performed by: EMERGENCY MEDICINE

## 2019-12-31 RX ORDER — NAPROXEN 375 MG/1
375 TABLET ORAL 2 TIMES DAILY PRN
Qty: 20 TABLET | Refills: 0 | Status: SHIPPED | OUTPATIENT
Start: 2019-12-31 | End: 2020-01-21 | Stop reason: SDUPTHER

## 2019-12-31 RX ORDER — NAPROXEN 500 MG/1
500 TABLET ORAL
Status: COMPLETED | OUTPATIENT
Start: 2019-12-31 | End: 2019-12-31

## 2019-12-31 RX ADMIN — NAPROXEN 500 MG: 500 TABLET ORAL at 09:12

## 2020-01-01 NOTE — ED TRIAGE NOTES
Presents to the ED from home, reports Left sided abdominal pain, described as gas pain that is intermittent x multiple months. Denies any fevers, chills, nausea, vomiting or any other symptom. Last BM today.

## 2020-01-01 NOTE — ED NOTES
Patient identifiers verified and correct for Rod Victor.  C/C: Abdominal Pain  APPEARANCE: awake and alert in NAD.  SKIN: warm, dry and intact. No breakdown or bruising.  MUSCULOSKELETAL: Patient moving all extremities spontaneously, no obvious swelling or deformities noted. Ambulates independently.  RESPIRATORY: Denies shortness of breath. Respirations unlabored.   CARDIAC: Denies CP, 2+ distal pulses; no peripheral edema  ABDOMEN: S/ND/NT, Denies nausea. +Pain to left side  : voids spontaneously, denies difficulty  Neurologic: AAO x 4; follows commands equal strength in all extremities; denies numbness/tingling. Denies dizziness or weakness.

## 2020-01-01 NOTE — ED PROVIDER NOTES
"Encounter Date: 12/31/2019    SCRIBE #1 NOTE: I, Disha Melgar, am scribing for, and in the presence of,  Dr. Pisano. I have scribed the entire note.       History     Chief Complaint   Patient presents with    Abdominal Pain     L sided, "gas cramps" began in October and intermittent since, took mylanta around 1600, last BM this AM     Time patient was seen by the provider: 9:03 PM    The patient is a 69 y.o. male presents to the ED with a chief complaint of left flank pain since October. The patient reports of intermittent pain since October he does not experience pain every single day. Pain often presents with bending over as a sharp sudden onset. He is taking Mylanta and metamucil that his doctor told him to take. He states he has relief when he is able to pass gas. Today he attempted to bend over to tie his shoe and the pain worsened.       The history is provided by the patient, the spouse and medical records.     Review of patient's allergies indicates:  No Known Allergies  Past Medical History:   Diagnosis Date    History of colonic polyps 10/24/2018    adenoma 2013, polyp 2016 at [facility] - 5 yrs    Rotator cuff tendonitis, left 10/24/2018    Strabismus 10/24/2018     Past Surgical History:   Procedure Laterality Date    NO PAST SURGERIES       History reviewed. No pertinent family history.  Social History     Tobacco Use    Smoking status: Never Smoker    Smokeless tobacco: Never Used   Substance Use Topics    Alcohol use: Yes     Frequency: Monthly or less     Drinks per session: Patient refused     Binge frequency: Never     Comment: socially    Drug use: No     Review of Systems  General: No fever.  No chills.  Eyes: No visual changes.  Head: No headache.    Integument: No rashes or lesions.  Chest: No shortness of breath.  Cardiovascular: No chest pain.  Abdomen: No abdominal pain.  No nausea or vomiting. +Left flank pain  Urinary: No abnormal urination.  Neurologic: No focal weakness.  No " numbness.  Hematologic: No easy bruising.  Endocrine: No excessive thirst or urination.    Physical Exam     Initial Vitals [12/31/19 2021]   BP Pulse Resp Temp SpO2   (!) 177/92 82 18 97.7 °F (36.5 °C) 98 %      MAP       --         Physical Exam  Appearance: No acute distress.  Skin: No rashes seen.  Good turgor.  No abrasions.  No ecchymoses.  Eyes: No conjunctival injection.  ENT: Oropharynx clear.    Chest: Clear to auscultation bilaterally.  Good air movement.  No wheezes.  No rhonchi.  Cardiovascular: Regular rate and rhythm.  No murmurs. No gallops. No rubs.  Abdomen: Soft.  Not distended.  Nontender.  No guarding.  No rebound.  Musculoskeletal: Good range of motion all joints.  No deformities.  Neck supple.  No meningismus.  Neurologic: Motor intact.  Sensation intact.  Cerebellar intact.  Cranial nerves intact.  Mental Status:  Alert and oriented x 3.  Appropriate, conversant.    ED Course   Procedures  Labs Reviewed - No data to display       Imaging Results    None          Medical Decision Making:   History:   Old Medical Records: I decided to obtain old medical records.  Initial Assessment:   Patient with flank pain that hurts with bending and twisting in a certain way. Sometimes it is better after he farts, but only slightly improves. Today he bent over to tie his shoe and got a sudden, sharp pain in the area that lasted a second or two. I doubt this is kidney stone -- no urinary symptoms, no dark urine.  Benign abdominal exam.  Doubt AAA. No indication for CT scan at this time.  Will treat as recurrent abdominal wall strain.              Scribe Attestation:   Scribe #1: I performed the above scribed service and the documentation accurately describes the services I performed. I attest to the accuracy of the note.                          Clinical Impression:       ICD-10-CM ICD-9-CM   1. Abdominal wall strain, initial encounter S39.011A 848.8         Disposition:   Disposition: Discharged  Condition:  Stable                     Barry Pisano MD  12/31/19 3261

## 2020-01-20 NOTE — PROGRESS NOTES
This note was created by combination of typed  and M-Modal dictation.  Transcription errors may be present.  If there are any questions, please contact me.    Assessment & Plan:   Left sided abdominal pain  -ongoing x 11/2019. Thinks it's improving, naproxen helps. S/p colonoscopy normal  Given that it still is symptomatic, though not severe, will check renal US to r/o kidney mass, stones in the kidney.   If normal hold on CT imaging.  -     naproxen (NAPROSYN) 375 MG tablet; Take 1 tablet (375 mg total) by mouth 2 (two) times daily as needed (pain).  Dispense: 60 tablet; Refill: 0  -     Cancel: CT Abdomen Pelvis With Contrast; Future; Expected date: 01/21/2020  -     Cancel: Basic metabolic panel; Future; Expected date: 01/21/2020  -     US Retroperitoneal Complete; Future; Expected date: 01/21/2020        Medications Discontinued During This Encounter   Medication Reason    naproxen (NAPROSYN) 375 MG tablet Reorder       meds sent this encounter:  Medications Ordered This Encounter   Medications    naproxen (NAPROSYN) 375 MG tablet     Sig: Take 1 tablet (375 mg total) by mouth 2 (two) times daily as needed (pain).     Dispense:  60 tablet     Refill:  0       Follow Up: No follow-ups on file.    Subjective:     Chief Complaint   Patient presents with    Abdominal Pain       WALKER Velasco is a 69 y.o. male, last appointment with this clinic was 12/18/2019.    Pt of Dr. Thao  I saw him mid December for LLQ pain.  I suspected MSK vs. Diverticulitis. XR KUB with constipation. No fever at that time. No abx.  ER visit 2 weeks later for L flank pain intermittent x 10/2019 worse with bending over.  Adams to be MSK.   11/2019 ER visit for L flank pain. Associated with gassiness    No CT imaging.  Consider CT if this continues.     Taking the naproxen periodically.    Still with the same pain in the same area.  Since new years, has been taking it easy. Still some soreness   Thinks maybe it started around  October. Possibly he was lifting something heavy/working on a car and felt sore and continued to stress himself and possibly delay healing.   No rash.   BMs are normal.  Urination is normal.  When he is cooking, and moving with cooking, may notice it. It comes and goes.  When it comes it's sharp and hurts to bend over.     Needs RF on the naproxen.  That does seem to help it.     Patient Care Team:  Scot Thao MD as PCP - General (Internal Medicine)  Eileen Mosley LPN as Care Coordinator    Patient Active Problem List    Diagnosis Date Noted    History of colonic polyps 10/24/2018     adenoma 2013      Strabismus 10/24/2018    Rotator cuff tendonitis, left 10/24/2018    Left shoulder pain 09/13/2017       PAST MEDICAL HISTORY:  Past Medical History:   Diagnosis Date    History of colonic polyps 10/24/2018    adenoma 2013, polyp 2016 at [facility] - 5 yrs    Rotator cuff tendonitis, left 10/24/2018    Strabismus 10/24/2018       PAST SURGICAL HISTORY:  Past Surgical History:   Procedure Laterality Date    NO PAST SURGERIES         SOCIAL HISTORY:  Social History     Socioeconomic History    Marital status:      Spouse name: Not on file    Number of children: Not on file    Years of education: Not on file    Highest education level: Not on file   Occupational History    Not on file   Social Needs    Financial resource strain: Not hard at all    Food insecurity:     Worry: Never true     Inability: Never true    Transportation needs:     Medical: No     Non-medical: No   Tobacco Use    Smoking status: Never Smoker    Smokeless tobacco: Never Used   Substance and Sexual Activity    Alcohol use: Yes     Frequency: Monthly or less     Drinks per session: Patient refused     Binge frequency: Never     Comment: socially    Drug use: No    Sexual activity: Yes     Partners: Female   Lifestyle    Physical activity:     Days per week: 2 days     Minutes per session: 30 min     "Stress: Not at all   Relationships    Social connections:     Talks on phone: Three times a week     Gets together: Twice a week     Attends [employer] service: Not on file     Active member of club or organization: Yes     Attends meetings of clubs or organizations: Never     Relationship status:    Other Topics Concern    Not on file   Social History Narrative    Not on file       ALLERGIES AND MEDICATIONS: updated and reviewed.  Review of patient's allergies indicates:  No Known Allergies  Current Outpatient Medications   Medication Sig Dispense Refill    naproxen (NAPROSYN) 375 MG tablet Take 1 tablet (375 mg total) by mouth 2 (two) times daily as needed (pain). 20 tablet 0    FLUZONE HIGH-DOSE 2019-20, PF, 180 mcg/0.5 mL Syrg ADM 0.5ML IM UTD  0     No current facility-administered medications for this visit.        Review of Systems   All other systems reviewed and are negative.      Objective:   Physical Exam   Vitals:    01/21/20 1300 01/21/20 1323   BP: (!) 146/82 130/70   BP Location: Right arm Left arm   Patient Position: Sitting Sitting   BP Method: Medium (Manual) Large (Manual)   Pulse: 99    Temp: 98.2 °F (36.8 °C)    TempSrc: Oral    SpO2: 97%    Weight: 94.8 kg (209 lb)    Height: 5' 11" (1.803 m)     Body mass index is 29.15 kg/m².  Weight: 94.8 kg (209 lb)   Height: 5' 11" (180.3 cm)     Physical Exam   Constitutional: He is oriented to person, place, and time. He appears well-developed and well-nourished. No distress.   HENT:   Head: Normocephalic and atraumatic.   Eyes: No scleral icterus.   Pulmonary/Chest: Effort normal.   Abdominal:       Pointing more towards the mid axillary line is area of discomfort, it is in between the bottom of the ribcage border in the top of the iliac crest, there is no mass, no induration, no flank area tenderness   Neurological: He is alert and oriented to person, place, and time.   Skin: Skin is warm and dry.   Psychiatric: He has a normal mood and " affect. His behavior is normal. Thought content normal.

## 2020-01-21 ENCOUNTER — OFFICE VISIT (OUTPATIENT)
Dept: FAMILY MEDICINE | Facility: CLINIC | Age: 70
End: 2020-01-21
Payer: MEDICARE

## 2020-01-21 VITALS
SYSTOLIC BLOOD PRESSURE: 130 MMHG | DIASTOLIC BLOOD PRESSURE: 70 MMHG | BODY MASS INDEX: 29.26 KG/M2 | OXYGEN SATURATION: 97 % | WEIGHT: 209 LBS | HEIGHT: 71 IN | TEMPERATURE: 98 F | HEART RATE: 99 BPM

## 2020-01-21 DIAGNOSIS — R10.9 LEFT SIDED ABDOMINAL PAIN: Primary | ICD-10-CM

## 2020-01-21 PROCEDURE — 99214 OFFICE O/P EST MOD 30 MIN: CPT | Mod: S$GLB,,, | Performed by: INTERNAL MEDICINE

## 2020-01-21 PROCEDURE — 1101F PR PT FALLS ASSESS DOC 0-1 FALLS W/OUT INJ PAST YR: ICD-10-PCS | Mod: CPTII,S$GLB,, | Performed by: INTERNAL MEDICINE

## 2020-01-21 PROCEDURE — 1125F AMNT PAIN NOTED PAIN PRSNT: CPT | Mod: S$GLB,,, | Performed by: INTERNAL MEDICINE

## 2020-01-21 PROCEDURE — 99999 PR PBB SHADOW E&M-EST. PATIENT-LVL IV: CPT | Mod: PBBFAC,,, | Performed by: INTERNAL MEDICINE

## 2020-01-21 PROCEDURE — 1101F PT FALLS ASSESS-DOCD LE1/YR: CPT | Mod: CPTII,S$GLB,, | Performed by: INTERNAL MEDICINE

## 2020-01-21 PROCEDURE — 1125F PR PAIN SEVERITY QUANTIFIED, PAIN PRESENT: ICD-10-PCS | Mod: S$GLB,,, | Performed by: INTERNAL MEDICINE

## 2020-01-21 PROCEDURE — 99999 PR PBB SHADOW E&M-EST. PATIENT-LVL IV: ICD-10-PCS | Mod: PBBFAC,,, | Performed by: INTERNAL MEDICINE

## 2020-01-21 PROCEDURE — 99214 PR OFFICE/OUTPT VISIT, EST, LEVL IV, 30-39 MIN: ICD-10-PCS | Mod: S$GLB,,, | Performed by: INTERNAL MEDICINE

## 2020-01-21 PROCEDURE — 1159F MED LIST DOCD IN RCRD: CPT | Mod: S$GLB,,, | Performed by: INTERNAL MEDICINE

## 2020-01-21 PROCEDURE — 1159F PR MEDICATION LIST DOCUMENTED IN MEDICAL RECORD: ICD-10-PCS | Mod: S$GLB,,, | Performed by: INTERNAL MEDICINE

## 2020-01-21 RX ORDER — NAPROXEN 375 MG/1
375 TABLET ORAL 2 TIMES DAILY PRN
Qty: 60 TABLET | Refills: 0 | Status: SHIPPED | OUTPATIENT
Start: 2020-01-21 | End: 2022-06-17

## 2020-01-29 ENCOUNTER — HOSPITAL ENCOUNTER (OUTPATIENT)
Dept: RADIOLOGY | Facility: HOSPITAL | Age: 70
Discharge: HOME | End: 2020-01-29
Attending: INTERNAL MEDICINE
Payer: MEDICARE

## 2020-01-29 DIAGNOSIS — R10.9 LEFT SIDED ABDOMINAL PAIN: ICD-10-CM

## 2020-01-29 PROCEDURE — 76770 US RETROPERITONEAL COMPLETE: ICD-10-PCS | Mod: 26,,, | Performed by: SPECIALIST

## 2020-01-29 PROCEDURE — 76770 US EXAM ABDO BACK WALL COMP: CPT | Mod: TC

## 2020-01-29 PROCEDURE — 76770 US EXAM ABDO BACK WALL COMP: CPT | Mod: 26,,, | Performed by: SPECIALIST

## 2020-01-31 ENCOUNTER — TELEPHONE (OUTPATIENT)
Dept: FAMILY MEDICINE | Facility: CLINIC | Age: 70
End: 2020-01-31

## 2020-01-31 DIAGNOSIS — R10.9 LEFT LATERAL ABDOMINAL PAIN: ICD-10-CM

## 2020-01-31 DIAGNOSIS — N13.30 HYDRONEPHROSIS OF LEFT KIDNEY: Primary | ICD-10-CM

## 2020-01-31 NOTE — TELEPHONE ENCOUNTER
"Please call pt - ultrasound shows that he has "hydronephrosis" which means that the tube coming out of the kidney is enlarged.  This might be due to possible kidney stone somewhere in the tube.    I need to do a CT scan of the abdomen and pelvis and then have him see urology    I will order the CT scan and put in the referral to urology  "

## 2020-01-31 NOTE — PROGRESS NOTES
Mild left hydronephrosis for which clinical and historical correlation is needed.  Bilateral ureteral jets are noted.    Had c/o L abd pain, the US with left hydro.  Need to get CT imaging to look for presence of distal ureteral stone and refer to urology

## 2020-02-07 ENCOUNTER — HOSPITAL ENCOUNTER (OUTPATIENT)
Dept: RADIOLOGY | Facility: HOSPITAL | Age: 70
Discharge: HOME | End: 2020-02-07
Attending: INTERNAL MEDICINE
Payer: MEDICARE

## 2020-02-07 DIAGNOSIS — R10.9 LEFT LATERAL ABDOMINAL PAIN: ICD-10-CM

## 2020-02-07 DIAGNOSIS — N13.30 HYDRONEPHROSIS OF LEFT KIDNEY: ICD-10-CM

## 2020-02-07 PROCEDURE — 74176 CT ABD & PELVIS W/O CONTRAST: CPT | Mod: 26,,, | Performed by: RADIOLOGY

## 2020-02-07 PROCEDURE — 74176 CT ABD & PELVIS W/O CONTRAST: CPT | Mod: TC

## 2020-02-07 PROCEDURE — 74176 CT ABDOMEN PELVIS WITHOUT CONTRAST: ICD-10-PCS | Mod: 26,,, | Performed by: RADIOLOGY

## 2020-02-10 DIAGNOSIS — N13.4 HYDROURETER, LEFT: Primary | ICD-10-CM

## 2020-02-10 PROBLEM — R91.1 NODULE OF UPPER LOBE OF RIGHT LUNG: Status: ACTIVE | Noted: 2020-02-10

## 2020-02-10 NOTE — PROGRESS NOTES
1. Moderate left hydronephrosis without hydroureter.  The appearance is nonspecific however may reflect that of chronic UPJ obstruction.  No findings to suggest obstructive uropathy or nephrolithiasis.  Correlation is advised.  2. 2 mm pulmonary nodule within the right upper lobe versus atelectasis.  For a solid nodule <6 mm, Fleischner Society 2017 guidelines recommend no routine follow up for a low risk patient, or follow-up with non-contrast chest CT at 12 months in a high risk patient.    Patient reviewed CT results online.  I will refer to Urology for workup for the hydro nephrosis.  Incidental 2 mm lung nodule, nonsmoker, no further testing.

## 2020-02-17 ENCOUNTER — PATIENT OUTREACH (OUTPATIENT)
Dept: ADMINISTRATIVE | Facility: OTHER | Age: 70
End: 2020-02-17

## 2020-02-18 ENCOUNTER — OFFICE VISIT (OUTPATIENT)
Dept: UROLOGY | Facility: CLINIC | Age: 70
End: 2020-02-18
Payer: MEDICARE

## 2020-02-18 VITALS
HEIGHT: 71 IN | DIASTOLIC BLOOD PRESSURE: 70 MMHG | WEIGHT: 209 LBS | SYSTOLIC BLOOD PRESSURE: 122 MMHG | BODY MASS INDEX: 29.26 KG/M2

## 2020-02-18 DIAGNOSIS — R10.9 LEFT FLANK PAIN: ICD-10-CM

## 2020-02-18 DIAGNOSIS — R10.9 LEFT LATERAL ABDOMINAL PAIN: ICD-10-CM

## 2020-02-18 DIAGNOSIS — N13.30 HYDRONEPHROSIS, LEFT: Primary | ICD-10-CM

## 2020-02-18 DIAGNOSIS — N13.30 HYDRONEPHROSIS OF LEFT KIDNEY: ICD-10-CM

## 2020-02-18 PROCEDURE — 99999 PR PBB SHADOW E&M-EST. PATIENT-LVL III: CPT | Mod: PBBFAC,,, | Performed by: NURSE PRACTITIONER

## 2020-02-18 PROCEDURE — 99204 PR OFFICE/OUTPT VISIT, NEW, LEVL IV, 45-59 MIN: ICD-10-PCS | Mod: 25,S$GLB,, | Performed by: NURSE PRACTITIONER

## 2020-02-18 PROCEDURE — 1125F PR PAIN SEVERITY QUANTIFIED, PAIN PRESENT: ICD-10-PCS | Mod: S$GLB,,, | Performed by: NURSE PRACTITIONER

## 2020-02-18 PROCEDURE — 99204 OFFICE O/P NEW MOD 45 MIN: CPT | Mod: 25,S$GLB,, | Performed by: NURSE PRACTITIONER

## 2020-02-18 PROCEDURE — 1159F MED LIST DOCD IN RCRD: CPT | Mod: S$GLB,,, | Performed by: NURSE PRACTITIONER

## 2020-02-18 PROCEDURE — 87086 URINE CULTURE/COLONY COUNT: CPT

## 2020-02-18 PROCEDURE — 1125F AMNT PAIN NOTED PAIN PRSNT: CPT | Mod: S$GLB,,, | Performed by: NURSE PRACTITIONER

## 2020-02-18 PROCEDURE — 1101F PT FALLS ASSESS-DOCD LE1/YR: CPT | Mod: CPTII,S$GLB,, | Performed by: NURSE PRACTITIONER

## 2020-02-18 PROCEDURE — 1101F PR PT FALLS ASSESS DOC 0-1 FALLS W/OUT INJ PAST YR: ICD-10-PCS | Mod: CPTII,S$GLB,, | Performed by: NURSE PRACTITIONER

## 2020-02-18 PROCEDURE — 99999 PR PBB SHADOW E&M-EST. PATIENT-LVL III: ICD-10-PCS | Mod: PBBFAC,,, | Performed by: NURSE PRACTITIONER

## 2020-02-18 PROCEDURE — 1159F PR MEDICATION LIST DOCUMENTED IN MEDICAL RECORD: ICD-10-PCS | Mod: S$GLB,,, | Performed by: NURSE PRACTITIONER

## 2020-02-18 PROCEDURE — 81001 POCT URINALYSIS, DIPSTICK OR TABLET REAGENT, AUTOMATED, WITH MICROSCOP: ICD-10-PCS | Mod: S$GLB,,, | Performed by: NURSE PRACTITIONER

## 2020-02-18 PROCEDURE — 81001 URINALYSIS AUTO W/SCOPE: CPT | Mod: S$GLB,,, | Performed by: NURSE PRACTITIONER

## 2020-02-18 RX ORDER — CIPROFLOXACIN 2 MG/ML
400 INJECTION, SOLUTION INTRAVENOUS
Status: CANCELLED | OUTPATIENT
Start: 2020-02-18

## 2020-02-18 NOTE — PROGRESS NOTES
Subjective:       Patient ID: Rod Victor is a 69 y.o. male who is a new patient was referred by Luis Miguel Buckley MD for evaluation of hydronephrosis    Chief Complaint:   Chief Complaint   Patient presents with    Other     Pt. is here for CT results .. has some questions in reference to an US he had as well .. states no other issues      Hydronephrosis  Patient with left flank/LLQ abdominal pain since 10/2019. He was seen by his PCP in mid December. Of note, pain felt to be musculoskeletal in nature vs diverticulitis. Constipation noted on KUB at that time. He was seen in the ER a few weeks later with left sided flank pain that worsened with bending over. Pain again felt to be more musculoskeletal in nature    Seen by PCP last month due to persistent pain. BANG obtained at that time. Imaging showed mild left hydronephrosis, bilateral jets noted. No renal masses or stones noted. Follow up CT scan done recently. Imaging showed moderate left hydronephrosis without hydroureter, ?chronic UPJ obstruction    Now with mild left sided flank pain. Reports pain is relieved when he passes flatus. Reports daily bowel movements--currently taking Metamucil. No previous  surgeries. Denies fever, dysuria or gross hematuria. No hx of tobacco use or kidney stones    ACTIVE MEDICAL ISSUES:  Patient Active Problem List   Diagnosis    Left shoulder pain    History of colonic polyps    Strabismus    Rotator cuff tendonitis, left    Nodule of upper lobe of right lung 2 mm incidental on CT 2/2020; nonsmoker. no further testing.    Hydroureter, left       PAST MEDICAL HISTORY  Past Medical History:   Diagnosis Date    History of colonic polyps 10/24/2018    adenoma 2013, polyp 2016 at [facility] - 5 yrs    Rotator cuff tendonitis, left 10/24/2018    Strabismus 10/24/2018       PAST SURGICAL HISTORY:  Past Surgical History:   Procedure Laterality Date    NO PAST SURGERIES         SOCIAL HISTORY:  Social History     Tobacco Use  "   Smoking status: Never Smoker    Smokeless tobacco: Never Used   Substance Use Topics    Alcohol use: Yes     Frequency: Monthly or less     Drinks per session: Patient refused     Binge frequency: Never     Comment: socially    Drug use: No       FAMILY HISTORY:  History reviewed. No pertinent family history.    ALLERGIES AND MEDICATIONS: updated and reviewed.  Review of patient's allergies indicates:  No Known Allergies  Current Outpatient Medications   Medication Sig    naproxen (NAPROSYN) 375 MG tablet Take 1 tablet (375 mg total) by mouth 2 (two) times daily as needed (pain).    FLUZONE HIGH-DOSE 2019-20, PF, 180 mcg/0.5 mL Syrg ADM 0.5ML IM UTD     No current facility-administered medications for this visit.        Review of Systems   Constitutional: Negative for activity change, chills, fatigue, fever and unexpected weight change.   Eyes: Negative for discharge, redness and visual disturbance.   Respiratory: Negative for cough, shortness of breath and wheezing.    Cardiovascular: Negative for chest pain and leg swelling.   Gastrointestinal: Positive for abdominal pain (LLQ). Negative for abdominal distention, constipation, diarrhea, nausea and vomiting.   Genitourinary: Positive for flank pain (left sided). Negative for difficulty urinating, discharge, dysuria, frequency, hematuria, penile pain, scrotal swelling, testicular pain and urgency.   Musculoskeletal: Negative for arthralgias, joint swelling and myalgias.   Skin: Negative for color change and rash.   Neurological: Negative for dizziness and light-headedness.   Psychiatric/Behavioral: Negative for behavioral problems and confusion. The patient is not nervous/anxious.        Objective:      Vitals:    02/18/20 1518   BP: 122/70   Weight: 94.8 kg (209 lb)   Height: 5' 11" (1.803 m)     Physical Exam   Constitutional: He is oriented to person, place, and time. He appears well-developed.   HENT:   Head: Normocephalic and atraumatic.   Nose: Nose " normal.   Eyes: Conjunctivae are normal. Right eye exhibits no discharge. Left eye exhibits no discharge.   Neck: Normal range of motion. Neck supple. No tracheal deviation present. No thyromegaly present.   Cardiovascular: Normal rate and regular rhythm.    Pulmonary/Chest: Effort normal. No respiratory distress. He has no wheezes.   Abdominal: Soft. He exhibits no distension. There is no hepatosplenomegaly. There is no tenderness. There is no CVA tenderness. No hernia.   Genitourinary:   Genitourinary Comments: Patient declined exam   Musculoskeletal: Normal range of motion. He exhibits no edema.   Neurological: He is alert and oriented to person, place, and time.   Skin: Skin is warm and dry. No rash noted. No erythema.     Psychiatric: He has a normal mood and affect. His behavior is normal. Judgment normal.       Urine dipstick shows negative for all components.  Micro exam: negative for WBC's or RBC's.    Narrative     EXAMINATION:  CT ABDOMEN PELVIS WITHOUT CONTRAST    CLINICAL HISTORY:  L abd pain, renal US with L hydro, r/o ureteral stone/obstruction; Unspecified hydronephrosis    TECHNIQUE:  Low dose axial images, sagittal and coronal reformations were obtained from the lung bases to the pubic symphysis.  Oral contrast was not administered.    COMPARISON:  Ultrasound 01/29/2022    FINDINGS:  Images of the lower thorax are remarkable for a possible 2 mm pulmonary nodule versus atelectasis within the right upper lobe.  There is bilateral basilar dependent atelectasis.    The liver, spleen, pancreas, gallbladder and adrenal glands have a grossly unremarkable noncontrast appearance.  The stomach is mildly distended with ingested content without wall thickening.  There is no biliary dilation or ascites.  The pancreatic duct is not dilated.  No significant abdominal lymphadenopathy.    There is mild to moderate left hydronephrosis.  The left ureter is unremarkable without calculi seen.  There is no left  nephrolithiasis.  The right kidney has a grossly unremarkable noncontrast appearance without hydronephrosis or nephrolithiasis, and the right ureter is unremarkable without calculi seen.  The urinary bladder is decompressed, limiting evaluation.  The prostate is not enlarged.    There are a few scattered colonic diverticula without inflammation.  The terminal ileum and appendix are unremarkable.  The small bowel is grossly unremarkable.  There are a few scattered shotty periaortic and paracaval lymph nodes.  Atherosclerotic calcification is noted of the aorta and its branches.    Degenerative changes are noted of the spine.  No significant inguinal lymphadenopathy.   Impression       1. Moderate left hydronephrosis without hydroureter.  The appearance is nonspecific however may reflect that of chronic UPJ obstruction.  No findings to suggest obstructive uropathy or nephrolithiasis.  Correlation is advised.  2. 2 mm pulmonary nodule within the right upper lobe versus atelectasis.  For a solid nodule <6 mm, Fleischner Society 2017 guidelines recommend no routine follow up for a low risk patient, or follow-up with non-contrast chest CT at 12 months in a high risk patient.  3. Additional findings above.      Electronically signed by: Jared Reinoso MD  Date: 02/07/2020  Time: 13:11     Reviewed with patient    Assessment:       1. Hydronephrosis, left    2. Left flank pain    3. Left lateral abdominal pain          Plan:       1. Hydronephrosis, left  -Plan for cystoscopy with bilateral RPG/possible ureteral stent placement on Wednesday 3/11/20 with Dr. Vásquez. Patient consented    2. Left flank pain  -Stable  -Discussed ER precautions with patient  - POCT urinalysis, dipstick or tablet reag  - Urine culture    3. Left lateral abdominal pain  -Stable  - POCT urinalysis, dipstick or tablet reag            Follow up in about 6 weeks (around 3/31/2020).

## 2020-02-18 NOTE — H&P (VIEW-ONLY)
Subjective:       Patient ID: Rod Victor is a 69 y.o. male who is a new patient was referred by Luis Miguel Buckley MD for evaluation of hydronephrosis     Chief Complaint:        Chief Complaint   Patient presents with    Other       Pt. is here for CT results .. has some questions in reference to an US he had as well .. states no other issues       Hydronephrosis  Patient with left flank/LLQ abdominal pain since 10/2019. He was seen by his PCP in mid December. Of note, pain felt to be musculoskeletal in nature vs diverticulitis. Constipation noted on KUB at that time. He was seen in the ER a few weeks later with left sided flank pain that worsened with bending over. Pain again felt to be more musculoskeletal in nature     Seen by PCP last month due to persistent pain. BANG obtained at that time. Imaging showed mild left hydronephrosis, bilateral jets noted. No renal masses or stones noted. Follow up CT scan done recently. Imaging showed moderate left hydronephrosis without hydroureter, ?chronic UPJ obstruction     Now with mild left sided flank pain. Reports pain is relieved when he passes flatus. Reports daily bowel movements--currently taking Metamucil. No previous  surgeries. Denies fever, dysuria or gross hematuria. No hx of tobacco use or kidney stones     ACTIVE MEDICAL ISSUES:      Patient Active Problem List   Diagnosis    Left shoulder pain    History of colonic polyps    Strabismus    Rotator cuff tendonitis, left    Nodule of upper lobe of right lung 2 mm incidental on CT 2/2020; nonsmoker. no further testing.    Hydroureter, left         PAST MEDICAL HISTORY  Past Medical History:   Diagnosis Date    History of colonic polyps 10/24/2018     adenoma 2013, polyp 2016 at [facility] - 5 yrs    Rotator cuff tendonitis, left 10/24/2018    Strabismus 10/24/2018         PAST SURGICAL HISTORY:        Past Surgical History:   Procedure Laterality Date    NO PAST SURGERIES             SOCIAL  "HISTORY:  Social History            Tobacco Use    Smoking status: Never Smoker    Smokeless tobacco: Never Used   Substance Use Topics    Alcohol use: Yes       Frequency: Monthly or less       Drinks per session: Patient refused       Binge frequency: Never       Comment: socially    Drug use: No         FAMILY HISTORY:  History reviewed. No pertinent family history.     ALLERGIES AND MEDICATIONS: updated and reviewed.  Review of patient's allergies indicates:  No Known Allergies       Current Outpatient Medications   Medication Sig    naproxen (NAPROSYN) 375 MG tablet Take 1 tablet (375 mg total) by mouth 2 (two) times daily as needed (pain).    FLUZONE HIGH-DOSE 2019-20, PF, 180 mcg/0.5 mL Syrg ADM 0.5ML IM UTD      No current facility-administered medications for this visit.          Review of Systems   Constitutional: Negative for activity change, chills, fatigue, fever and unexpected weight change.   Eyes: Negative for discharge, redness and visual disturbance.   Respiratory: Negative for cough, shortness of breath and wheezing.    Cardiovascular: Negative for chest pain and leg swelling.   Gastrointestinal: Positive for abdominal pain (LLQ). Negative for abdominal distention, constipation, diarrhea, nausea and vomiting.   Genitourinary: Positive for flank pain (left sided). Negative for difficulty urinating, discharge, dysuria, frequency, hematuria, penile pain, scrotal swelling, testicular pain and urgency.   Musculoskeletal: Negative for arthralgias, joint swelling and myalgias.   Skin: Negative for color change and rash.   Neurological: Negative for dizziness and light-headedness.   Psychiatric/Behavioral: Negative for behavioral problems and confusion. The patient is not nervous/anxious.        Objective:   Vitals       Vitals:     02/18/20 1518   BP: 122/70   Weight: 94.8 kg (209 lb)   Height: 5' 11" (1.803 m)         Physical Exam   Constitutional: He is oriented to person, place, and time. He " appears well-developed.   HENT:   Head: Normocephalic and atraumatic.   Nose: Nose normal.   Eyes: Conjunctivae are normal. Right eye exhibits no discharge. Left eye exhibits no discharge.   Neck: Normal range of motion. Neck supple. No tracheal deviation present. No thyromegaly present.   Cardiovascular: Normal rate and regular rhythm.    Pulmonary/Chest: Effort normal. No respiratory distress. He has no wheezes.   Abdominal: Soft. He exhibits no distension. There is no hepatosplenomegaly. There is no tenderness. There is no CVA tenderness. No hernia.   Genitourinary:   Genitourinary Comments: Patient declined exam   Musculoskeletal: Normal range of motion. He exhibits no edema.   Neurological: He is alert and oriented to person, place, and time.   Skin: Skin is warm and dry. No rash noted. No erythema.     Psychiatric: He has a normal mood and affect. His behavior is normal. Judgment normal.       Urine dipstick shows negative for all components.  Micro exam: negative for WBC's or RBC's.     Narrative      EXAMINATION:  CT ABDOMEN PELVIS WITHOUT CONTRAST    CLINICAL HISTORY:  L abd pain, renal US with L hydro, r/o ureteral stone/obstruction; Unspecified hydronephrosis    TECHNIQUE:  Low dose axial images, sagittal and coronal reformations were obtained from the lung bases to the pubic symphysis.  Oral contrast was not administered.    COMPARISON:  Ultrasound 01/29/2022    FINDINGS:  Images of the lower thorax are remarkable for a possible 2 mm pulmonary nodule versus atelectasis within the right upper lobe.  There is bilateral basilar dependent atelectasis.    The liver, spleen, pancreas, gallbladder and adrenal glands have a grossly unremarkable noncontrast appearance.  The stomach is mildly distended with ingested content without wall thickening.  There is no biliary dilation or ascites.  The pancreatic duct is not dilated.  No significant abdominal lymphadenopathy.    There is mild to moderate left  hydronephrosis.  The left ureter is unremarkable without calculi seen.  There is no left nephrolithiasis.  The right kidney has a grossly unremarkable noncontrast appearance without hydronephrosis or nephrolithiasis, and the right ureter is unremarkable without calculi seen.  The urinary bladder is decompressed, limiting evaluation.  The prostate is not enlarged.    There are a few scattered colonic diverticula without inflammation.  The terminal ileum and appendix are unremarkable.  The small bowel is grossly unremarkable.  There are a few scattered shotty periaortic and paracaval lymph nodes.  Atherosclerotic calcification is noted of the aorta and its branches.    Degenerative changes are noted of the spine.  No significant inguinal lymphadenopathy.   Impression        1. Moderate left hydronephrosis without hydroureter.  The appearance is nonspecific however may reflect that of chronic UPJ obstruction.  No findings to suggest obstructive uropathy or nephrolithiasis.  Correlation is advised.  2. 2 mm pulmonary nodule within the right upper lobe versus atelectasis.  For a solid nodule <6 mm, Fleischner Society 2017 guidelines recommend no routine follow up for a low risk patient, or follow-up with non-contrast chest CT at 12 months in a high risk patient.  3. Additional findings above.      Electronically signed by: Jared Reinoso MD  Date: 02/07/2020  Time: 13:11      Reviewed with patient     Assessment:       1. Hydronephrosis, left    2. Left flank pain    3. Left lateral abdominal pain           Plan:       1. Hydronephrosis, left  -Plan for cystoscopy with bilateral RPG/possible ureteral stent placement on Wednesday 3/11/20 with Dr. Vásquez. Patient consented     2. Left flank pain  -Stable  -Discussed ER precautions with patient  - POCT urinalysis, dipstick or tablet reag  - Urine culture     3. Left lateral abdominal pain  -Stable  - POCT urinalysis, dipstick or tablet reag               Follow up in  about 6 weeks (around 3/31/2020).

## 2020-02-19 LAB
BILIRUB SERPL-MCNC: NORMAL MG/DL
BLOOD URINE, POC: NORMAL
COLOR, POC UA: YELLOW
GLUCOSE UR QL STRIP: NORMAL
KETONES UR QL STRIP: NORMAL
LEUKOCYTE ESTERASE URINE, POC: NORMAL
NITRITE, POC UA: NORMAL
PH, POC UA: 5
PROTEIN, POC: NORMAL
SPECIFIC GRAVITY, POC UA: 1010
UROBILINOGEN, POC UA: NORMAL

## 2020-02-20 LAB — BACTERIA UR CULT: NO GROWTH

## 2020-03-04 ENCOUNTER — ANESTHESIA EVENT (OUTPATIENT)
Dept: SURGERY | Facility: HOSPITAL | Age: 70
End: 2020-03-04
Payer: MEDICARE

## 2020-03-04 ENCOUNTER — HOSPITAL ENCOUNTER (OUTPATIENT)
Dept: PREADMISSION TESTING | Facility: HOSPITAL | Age: 70
Discharge: HOME | End: 2020-03-04
Attending: UROLOGY
Payer: MEDICARE

## 2020-03-04 VITALS
TEMPERATURE: 98 F | DIASTOLIC BLOOD PRESSURE: 85 MMHG | BODY MASS INDEX: 29.08 KG/M2 | RESPIRATION RATE: 18 BRPM | SYSTOLIC BLOOD PRESSURE: 136 MMHG | HEART RATE: 101 BPM | HEIGHT: 71 IN | OXYGEN SATURATION: 99 % | WEIGHT: 207.69 LBS

## 2020-03-04 DIAGNOSIS — N13.30 HYDRONEPHROSIS, LEFT: ICD-10-CM

## 2020-03-04 DIAGNOSIS — Z01.818 PREOP TESTING: Primary | ICD-10-CM

## 2020-03-04 LAB
ANION GAP SERPL CALC-SCNC: 8 MMOL/L (ref 8–16)
BASOPHILS # BLD AUTO: 0.02 K/UL (ref 0–0.2)
BASOPHILS NFR BLD: 0.3 % (ref 0–1.9)
BUN SERPL-MCNC: 12 MG/DL (ref 8–23)
CALCIUM SERPL-MCNC: 9.2 MG/DL (ref 8.7–10.5)
CHLORIDE SERPL-SCNC: 105 MMOL/L (ref 95–110)
CO2 SERPL-SCNC: 25 MMOL/L (ref 23–29)
CREAT SERPL-MCNC: 1 MG/DL (ref 0.5–1.4)
DIFFERENTIAL METHOD: ABNORMAL
EOSINOPHIL # BLD AUTO: 0.1 K/UL (ref 0–0.5)
EOSINOPHIL NFR BLD: 1.2 % (ref 0–8)
ERYTHROCYTE [DISTWIDTH] IN BLOOD BY AUTOMATED COUNT: 12.5 % (ref 11.5–14.5)
EST. GFR  (AFRICAN AMERICAN): >60 ML/MIN/1.73 M^2
EST. GFR  (NON AFRICAN AMERICAN): >60 ML/MIN/1.73 M^2
GLUCOSE SERPL-MCNC: 123 MG/DL (ref 70–110)
HCT VFR BLD AUTO: 43.3 % (ref 40–54)
HGB BLD-MCNC: 14.8 G/DL (ref 14–18)
IMM GRANULOCYTES # BLD AUTO: 0.01 K/UL (ref 0–0.04)
IMM GRANULOCYTES NFR BLD AUTO: 0.1 % (ref 0–0.5)
LYMPHOCYTES # BLD AUTO: 1.6 K/UL (ref 1–4.8)
LYMPHOCYTES NFR BLD: 22.8 % (ref 18–48)
MCH RBC QN AUTO: 32.7 PG (ref 27–31)
MCHC RBC AUTO-ENTMCNC: 34.2 G/DL (ref 32–36)
MCV RBC AUTO: 96 FL (ref 82–98)
MONOCYTES # BLD AUTO: 0.7 K/UL (ref 0.3–1)
MONOCYTES NFR BLD: 9.6 % (ref 4–15)
NEUTROPHILS # BLD AUTO: 4.5 K/UL (ref 1.8–7.7)
NEUTROPHILS NFR BLD: 66 % (ref 38–73)
NRBC BLD-RTO: 0 /100 WBC
PLATELET # BLD AUTO: 212 K/UL (ref 150–350)
PMV BLD AUTO: 10.7 FL (ref 9.2–12.9)
POTASSIUM SERPL-SCNC: 3.6 MMOL/L (ref 3.5–5.1)
RBC # BLD AUTO: 4.52 M/UL (ref 4.6–6.2)
SODIUM SERPL-SCNC: 138 MMOL/L (ref 136–145)
WBC # BLD AUTO: 6.84 K/UL (ref 3.9–12.7)

## 2020-03-04 PROCEDURE — 93010 EKG 12-LEAD: ICD-10-PCS | Mod: ,,, | Performed by: INTERNAL MEDICINE

## 2020-03-04 PROCEDURE — 93010 ELECTROCARDIOGRAM REPORT: CPT | Mod: ,,, | Performed by: INTERNAL MEDICINE

## 2020-03-04 PROCEDURE — 36415 COLL VENOUS BLD VENIPUNCTURE: CPT

## 2020-03-04 PROCEDURE — 80048 BASIC METABOLIC PNL TOTAL CA: CPT

## 2020-03-04 PROCEDURE — 93005 ELECTROCARDIOGRAM TRACING: CPT

## 2020-03-04 PROCEDURE — 85025 COMPLETE CBC W/AUTO DIFF WBC: CPT

## 2020-03-04 NOTE — ANESTHESIA PREPROCEDURE EVALUATION
03/04/2020  Rod Victor is a 69 y.o., male.    Anesthesia Evaluation     I have reviewed the Nursing Notes.      Review of Systems  Anesthesia Hx:  No problems with previous Anesthesia   Social:  Non-Smoker    Cardiovascular:  Cardiovascular Normal Exercise tolerance: good     Pulmonary:  Pulmonary Normal    Renal/:   Chronic Renal Disease    Hepatic/GI:  Hepatic/GI Normal    Neurological:  Neurology Normal    Endocrine:  Endocrine Normal        Physical Exam  General:  Obesity    Airway/Jaw/Neck:  AIRWAY FINDINGS: Normal      Chest/Lungs:  Chest/Lungs Clear    Heart/Vascular:  Heart Findings: Normal       Mental Status:  Mental Status Findings: Normal        Anesthesia Plan  Type of Anesthesia, risks & benefits discussed:  Anesthesia Type:  general  Patient's Preference:   Intra-op Monitoring Plan: standard ASA monitors  Intra-op Monitoring Plan Comments:   Post Op Pain Control Plan:   Post Op Pain Control Plan Comments:   Induction:   IV  Beta Blocker:  Patient is not currently on a Beta-Blocker (No further documentation required).       Informed Consent: Patient understands risks and agrees with Anesthesia plan.  Questions answered. Anesthesia consent signed with patient.  ASA Score: 2     Day of Surgery Review of History & Physical:  There are no significant changes.  H&P update referred to the provider.     Anesthesia Plan Notes: Krysten risk of major adverse cardiac event <1% (in fact it is 0.1%). No need for further eval.        Ready For Surgery From Anesthesia Perspective.

## 2020-03-04 NOTE — DISCHARGE INSTRUCTIONS
"Your procedure  is scheduled for __3/11/2020________.    Call [phone] between 2pm and 5pm on _3/10/2020______to find out your arrival time for the day of surgery.    Report to Same Day Surgery Unit at ____ AM on the 2nd floor of the hospital.  Use the front entrance of the hospital.  The front doors of the hospital open promptly at 5:30am.  If you need wheelchair assistance, call [phone] from your cell phone, or call "0" from the courtesy phone in the lobby.    Important instructions:   Do not eat or drink after 12 midnight, including water.  It is okay to brush your teeth.  Do not have gum, candy or mints.     Take only these medications with a small swallow of water on the morning of your surgery ____none__________      Stop taking Aspirin, Ibuprofen, Motrin and Aleve , Fish oil, and Vitamin E for at least 7 days before your surgery. You may use Tylenol unless otherwise instructed by your doctor.         Please shower the night before and the morning of your surgery.       You may wear deodorant only.      Do not wear powder, body lotion or perfume/cologne.     Do not wear any jewelry or have any metal on your body.     You will be asked to remove any dentures or partials for the procedure.     Please bring any documents given to you by your doctor.     If you are going home on the same day of surgery, you must arrange for a family member or a friend to drive you home.  Public transportation is prohibited.  You will not be able to drive home if you were given anesthesia or sedation.     Wear loose fitting clothes allowing for bandages.     Please leave money and valuables home.       You may bring your cell phone.     Call the doctor if fever or illness should occur before your surgery.    Call [phone] to contact us here if needed.  "

## 2020-03-11 ENCOUNTER — HOSPITAL ENCOUNTER (OUTPATIENT)
Facility: HOSPITAL | Age: 70
Discharge: HOME | End: 2020-03-11
Attending: UROLOGY | Admitting: UROLOGY
Payer: MEDICARE

## 2020-03-11 ENCOUNTER — ANESTHESIA (OUTPATIENT)
Dept: SURGERY | Facility: HOSPITAL | Age: 70
End: 2020-03-11
Payer: MEDICARE

## 2020-03-11 VITALS
RESPIRATION RATE: 16 BRPM | HEIGHT: 71 IN | HEART RATE: 67 BPM | TEMPERATURE: 98 F | DIASTOLIC BLOOD PRESSURE: 76 MMHG | BODY MASS INDEX: 29.08 KG/M2 | WEIGHT: 207.69 LBS | OXYGEN SATURATION: 98 % | SYSTOLIC BLOOD PRESSURE: 138 MMHG

## 2020-03-11 DIAGNOSIS — N13.30 HYDRONEPHROSIS, LEFT: ICD-10-CM

## 2020-03-11 DIAGNOSIS — N13.30 HYDRONEPHROSIS OF LEFT KIDNEY: Primary | ICD-10-CM

## 2020-03-11 PROCEDURE — 74420 UROGRAPHY RTRGR +-KUB: CPT | Mod: 26,,, | Performed by: UROLOGY

## 2020-03-11 PROCEDURE — 74420 PR  X-RAY RETROGRADE PYELOGRAM: ICD-10-PCS | Mod: 26,,, | Performed by: UROLOGY

## 2020-03-11 PROCEDURE — 63600175 PHARM REV CODE 636 W HCPCS: Performed by: ANESTHESIOLOGY

## 2020-03-11 PROCEDURE — D9220A PRA ANESTHESIA: Mod: ANES,,, | Performed by: ANESTHESIOLOGY

## 2020-03-11 PROCEDURE — 36000707: Performed by: UROLOGY

## 2020-03-11 PROCEDURE — C1758 CATHETER, URETERAL: HCPCS | Performed by: UROLOGY

## 2020-03-11 PROCEDURE — 25000003 PHARM REV CODE 250: Performed by: UROLOGY

## 2020-03-11 PROCEDURE — 52005 PR CYSTOURETHROSCOPY,URETER CATHETER: ICD-10-PCS | Mod: ,,, | Performed by: UROLOGY

## 2020-03-11 PROCEDURE — 52005 CYSTO W/URTRL CATHJ: CPT | Mod: ,,, | Performed by: UROLOGY

## 2020-03-11 PROCEDURE — 71000033 HC RECOVERY, INTIAL HOUR: Performed by: UROLOGY

## 2020-03-11 PROCEDURE — 37000009 HC ANESTHESIA EA ADD 15 MINS: Performed by: UROLOGY

## 2020-03-11 PROCEDURE — 36000706: Performed by: UROLOGY

## 2020-03-11 PROCEDURE — D9220A PRA ANESTHESIA: ICD-10-PCS | Mod: ANES,,, | Performed by: ANESTHESIOLOGY

## 2020-03-11 PROCEDURE — C1769 GUIDE WIRE: HCPCS | Performed by: UROLOGY

## 2020-03-11 PROCEDURE — 71000016 HC POSTOP RECOV ADDL HR: Performed by: UROLOGY

## 2020-03-11 PROCEDURE — 63600175 PHARM REV CODE 636 W HCPCS: Performed by: UROLOGY

## 2020-03-11 PROCEDURE — D9220A PRA ANESTHESIA: ICD-10-PCS | Mod: CRNA,,, | Performed by: NURSE ANESTHETIST, CERTIFIED REGISTERED

## 2020-03-11 PROCEDURE — 63600175 PHARM REV CODE 636 W HCPCS: Performed by: NURSE ANESTHETIST, CERTIFIED REGISTERED

## 2020-03-11 PROCEDURE — 71000015 HC POSTOP RECOV 1ST HR: Performed by: UROLOGY

## 2020-03-11 PROCEDURE — 37000008 HC ANESTHESIA 1ST 15 MINUTES: Performed by: UROLOGY

## 2020-03-11 PROCEDURE — D9220A PRA ANESTHESIA: Mod: CRNA,,, | Performed by: NURSE ANESTHETIST, CERTIFIED REGISTERED

## 2020-03-11 PROCEDURE — 25500020 PHARM REV CODE 255: Performed by: UROLOGY

## 2020-03-11 RX ORDER — FENTANYL CITRATE 50 UG/ML
25 INJECTION, SOLUTION INTRAMUSCULAR; INTRAVENOUS EVERY 5 MIN PRN
Status: DISCONTINUED | OUTPATIENT
Start: 2020-03-11 | End: 2020-03-11 | Stop reason: HOSPADM

## 2020-03-11 RX ORDER — SODIUM CHLORIDE, SODIUM LACTATE, POTASSIUM CHLORIDE, CALCIUM CHLORIDE 600; 310; 30; 20 MG/100ML; MG/100ML; MG/100ML; MG/100ML
INJECTION, SOLUTION INTRAVENOUS CONTINUOUS
Status: DISCONTINUED | OUTPATIENT
Start: 2020-03-11 | End: 2020-03-11 | Stop reason: HOSPADM

## 2020-03-11 RX ORDER — FENTANYL CITRATE 50 UG/ML
INJECTION, SOLUTION INTRAMUSCULAR; INTRAVENOUS
Status: DISCONTINUED | OUTPATIENT
Start: 2020-03-11 | End: 2020-03-11

## 2020-03-11 RX ORDER — HYDROMORPHONE HYDROCHLORIDE 2 MG/ML
0.2 INJECTION, SOLUTION INTRAMUSCULAR; INTRAVENOUS; SUBCUTANEOUS EVERY 5 MIN PRN
Status: DISCONTINUED | OUTPATIENT
Start: 2020-03-11 | End: 2020-03-11 | Stop reason: HOSPADM

## 2020-03-11 RX ORDER — LIDOCAINE HYDROCHLORIDE 10 MG/ML
1 INJECTION, SOLUTION EPIDURAL; INFILTRATION; INTRACAUDAL; PERINEURAL ONCE
Status: DISCONTINUED | OUTPATIENT
Start: 2020-03-11 | End: 2020-03-11 | Stop reason: HOSPADM

## 2020-03-11 RX ORDER — MIDAZOLAM HYDROCHLORIDE 1 MG/ML
INJECTION, SOLUTION INTRAMUSCULAR; INTRAVENOUS
Status: DISCONTINUED | OUTPATIENT
Start: 2020-03-11 | End: 2020-03-11

## 2020-03-11 RX ORDER — PROPOFOL 10 MG/ML
VIAL (ML) INTRAVENOUS
Status: DISCONTINUED | OUTPATIENT
Start: 2020-03-11 | End: 2020-03-11

## 2020-03-11 RX ORDER — CIPROFLOXACIN 2 MG/ML
400 INJECTION, SOLUTION INTRAVENOUS
Status: COMPLETED | OUTPATIENT
Start: 2020-03-11 | End: 2020-03-11

## 2020-03-11 RX ORDER — LIDOCAINE HCL/PF 100 MG/5ML
SYRINGE (ML) INTRAVENOUS
Status: DISCONTINUED | OUTPATIENT
Start: 2020-03-11 | End: 2020-03-11

## 2020-03-11 RX ORDER — PHENAZOPYRIDINE HYDROCHLORIDE 200 MG/1
200 TABLET, FILM COATED ORAL 3 TIMES DAILY PRN
Qty: 21 TABLET | Refills: 0 | Status: SHIPPED | OUTPATIENT
Start: 2020-03-11 | End: 2022-06-17

## 2020-03-11 RX ORDER — ACETAMINOPHEN 10 MG/ML
1000 INJECTION, SOLUTION INTRAVENOUS ONCE
Status: COMPLETED | OUTPATIENT
Start: 2020-03-11 | End: 2020-03-11

## 2020-03-11 RX ORDER — PHENAZOPYRIDINE HYDROCHLORIDE 100 MG/1
200 TABLET, FILM COATED ORAL ONCE
Status: COMPLETED | OUTPATIENT
Start: 2020-03-11 | End: 2020-03-11

## 2020-03-11 RX ORDER — SODIUM CHLORIDE 0.9 % (FLUSH) 0.9 %
3 SYRINGE (ML) INJECTION
Status: DISCONTINUED | OUTPATIENT
Start: 2020-03-11 | End: 2020-03-11 | Stop reason: HOSPADM

## 2020-03-11 RX ORDER — ONDANSETRON 2 MG/ML
INJECTION INTRAMUSCULAR; INTRAVENOUS
Status: DISCONTINUED | OUTPATIENT
Start: 2020-03-11 | End: 2020-03-11

## 2020-03-11 RX ADMIN — PROPOFOL 120 MG: 10 INJECTION, EMULSION INTRAVENOUS at 07:03

## 2020-03-11 RX ADMIN — MIDAZOLAM HYDROCHLORIDE 2 MG: 1 INJECTION, SOLUTION INTRAMUSCULAR; INTRAVENOUS at 07:03

## 2020-03-11 RX ADMIN — SODIUM CHLORIDE, SODIUM LACTATE, POTASSIUM CHLORIDE, AND CALCIUM CHLORIDE: .6; .31; .03; .02 INJECTION, SOLUTION INTRAVENOUS at 07:03

## 2020-03-11 RX ADMIN — LIDOCAINE HYDROCHLORIDE 80 MG: 20 INJECTION, SOLUTION INTRAVENOUS at 07:03

## 2020-03-11 RX ADMIN — PHENAZOPYRIDINE HYDROCHLORIDE 200 MG: 100 TABLET ORAL at 07:03

## 2020-03-11 RX ADMIN — ONDANSETRON 4 MG: 2 INJECTION, SOLUTION INTRAMUSCULAR; INTRAVENOUS at 07:03

## 2020-03-11 RX ADMIN — CIPROFLOXACIN 400 MG: 2 INJECTION, SOLUTION INTRAVENOUS at 06:03

## 2020-03-11 RX ADMIN — FENTANYL CITRATE 100 MCG: 50 INJECTION INTRAMUSCULAR; INTRAVENOUS at 07:03

## 2020-03-11 RX ADMIN — ACETAMINOPHEN 1000 MG: 10 INJECTION, SOLUTION INTRAVENOUS at 07:03

## 2020-03-11 NOTE — OP NOTE
DATE OF PROCEDURE:  03/11/2020      PREOPERATIVE DIAGNOSIS: Left Hydronephrosis     POSTOPERATIVE DIAGNOSIS:  left hydronephrosis     PROCEDURE PERFORMED: Cystoscopy with Bilateral Retrograde Pyelogram, Fluoroscopy     PRIMARY SURGEON:  Shane Vásquez M.D.     ANESTHESIA:  General.     ESTIMATED BLOOD LOSS:  Minimal.     DRAINS:  None.     COMPLICATIONS:  None.      INDICATIONS:  Rod Victor is a 69 y.o. male with history with a CT scan that suggested left hydronephrosis.   He is here today for Cystoscopy with bilateral retrograde possible left ureteral stent placement.    PROCEDURE:   Rod Victor was taken to the Operating Room where he was positively   identified by sam.  He was placed supine on the operating room table.    Following induction of adequate general anesthesia, he was placed in the dorsal   lithotomy position and his external genitalia were prepped and draped in the   usual sterile fashion.     A preoperative timeout was performed as well as confirmation of preoperative   antibiotics.    A  film was taken using fluoroscopy.     A 22-Stateless rigid cystoscope was then passed per urethra into the bladder under   direct vision.  There were no urethral lesions seen.  The prostate had minimal prostatic hypertrophy.   The bladder was inspected with 30 and 70 degree lenses.   No bladder lesions seen.  Both ureteral orifices were identified.  They were seen to be in orthotopic position.       The right ureteral orifice was intubated using a 5-Fr open ended catheter.  A retrograde pyelogram was performed.  Contrast was seen filling the ureter and pyelocaliceal system.  There was no filling defects seen.  There was no hydronephrosis or hydroureter.       The 5 Fr-open ended catheter was then used to intubate the left ureteral orifice.  A retrograde pyelogram was performed.  Contrast was seen filling the ureter and pyelocaliceal system.  There was no filling defects seen.  There was no  hydronephrosis or hydroureter.      The scope was then used to drain the bladder.  Post-drainage films appeared normal.  The scope was then withdrawn.     His anesthesia was reversed.  He was taken to the Recovery Room in stable   condition.

## 2020-03-11 NOTE — BRIEF OP NOTE
[facility]  Brief Operative Note    Surgery Date: 3/11/2020     Surgeon(s) and Role:     * ANABELL Vásquez MD - Primary    Assisting Surgeon: None    Pre-op Diagnosis:  Hydronephrosis, left [N13.30]    Post-op Diagnosis:  Post-Op Diagnosis Codes:     * Hydronephrosis, left [N13.30]    Procedure(s) (LRB):  CYSTOSCOPY, WITH RETROGRADE PYELOGRAM (Bilateral)      Anesthesia: General    Description of the findings of the procedure(s): No hydronephrosis, contrast drained promptly from the collecting system    Estimated Blood Loss: * No values recorded between 3/11/2020  7:17 AM and 3/11/2020  7:26 AM *         Specimens:   Specimen (12h ago, onward)    None            Discharge Note    OUTCOME: Patient tolerated treatment/procedure well without complication and is now ready for discharge.    DISPOSITION: Home or Self Care    FINAL DIAGNOSIS:  Hydronephrosis of left kidney    FOLLOWUP: In clinic    DISCHARGE INSTRUCTIONS:    Discharge Procedure Orders   Diet general     Call MD for:   Order Comments: Significant Hematuria

## 2020-03-11 NOTE — TRANSFER OF CARE
"Anesthesia Transfer of Care Note    Patient: Rod Victor    Procedure(s) Performed: Procedure(s) (LRB):  CYSTOSCOPY, WITH RETROGRADE PYELOGRAM (Bilateral)  CYSTOSCOPY, WITH URETERAL STENT INSERTION (Left)    Patient location: PACU    Anesthesia Type: general    Transport from OR: Transported from OR on 6-10 L/min O2 by face mask with adequate spontaneous ventilation    Post pain: adequate analgesia    Post assessment: no apparent anesthetic complications    Post vital signs: stable    Level of consciousness: awake and alert    Nausea/Vomiting: no nausea/vomiting    Complications: none    Transfer of care protocol was followed      Last vitals:   Visit Vitals  /62   Pulse 78   Temp 36.7 °C (98 °F)   Resp 17   Ht 5' 11" (1.803 m)   Wt 94.2 kg (207 lb 10.8 oz)   SpO2 100%   BMI 28.96 kg/m²     "

## 2020-03-11 NOTE — DISCHARGE SUMMARY
[facility]  Urology  Discharge Summary      Patient Name: Rod Victor   MRN: [id number]  Admission Date: 03/11/2020   Hospital Length of Stay: 0 days  Discharge Date and Time:  03/11/2020 7:27 AM  Attending Physician: ANABELL Vásquez MD   Discharging Provider: BERRY Vásquez MD  Primary Care Physician: Scot Thao      HPI: Patient was admitted for an outpatient procedure and tolerated the procedure well with no complications.     Procedures: Procedure(s):  CYSTOSCOPY, WITH RETROGRADE PYELOGRAM          Indwelling Lines/Drains at time of discharge:           Hospital Course (synopsis of major diagnoses, care, treatment, and services provided during the course of the hospital stay): Patient was admitted for an outpatient procedure and tolerated the procedure well with no complications.         Final Active Diagnoses:    Diagnosis Date Noted POA    Hydronephrosis of left kidney   03/11/2020  Yes      Problems Resolved During this Admission:       Discharged Condition: stable    Disposition: Home or Self Care    Follow Up:     Patient Instructions:      Diet general     Call MD for:   Order Comments: Significant Hematuria     Medications:  Reconciled Home Medications:      Medication List      START taking these medications    phenazopyridine 200 MG tablet  Commonly known as:  PYRIDIUM  Take 1 tablet (200 mg total) by mouth 3 (three) times daily as needed for Pain (Burning).        CONTINUE taking these medications    naproxen 375 MG tablet  Commonly known as:  NAPROSYN  Take 1 tablet (375 mg total) by mouth 2 (two) times daily as needed (pain).              BERRY Vásquez MD  Urology  [facility]

## 2020-03-11 NOTE — INTERVAL H&P NOTE
The patient has been examined and the H&P has been reviewed:    I concur with the findings and no changes have occurred since H&P was written.    Anesthesia/Surgery risks, benefits and alternative options discussed and understood by patient/family.          Active Hospital Problems    Diagnosis  POA    Hydronephrosis of left kidney [N13.30]  Yes      Resolved Hospital Problems   No resolved problems to display.

## 2020-03-30 ENCOUNTER — PATIENT OUTREACH (OUTPATIENT)
Dept: ADMINISTRATIVE | Facility: OTHER | Age: 70
End: 2020-03-30

## 2020-03-31 ENCOUNTER — OFFICE VISIT (OUTPATIENT)
Dept: UROLOGY | Facility: CLINIC | Age: 70
End: 2020-03-31
Payer: MEDICARE

## 2020-03-31 ENCOUNTER — PATIENT MESSAGE (OUTPATIENT)
Dept: UROLOGY | Facility: CLINIC | Age: 70
End: 2020-03-31

## 2020-03-31 DIAGNOSIS — Q62.11 HYDRONEPHROSIS WITH URETEROPELVIC JUNCTION (UPJ) OBSTRUCTION: Primary | ICD-10-CM

## 2020-03-31 DIAGNOSIS — R10.9 LEFT FLANK PAIN: ICD-10-CM

## 2020-03-31 PROCEDURE — 99212 OFFICE O/P EST SF 10 MIN: CPT | Mod: 95,,, | Performed by: UROLOGY

## 2020-03-31 PROCEDURE — 1159F PR MEDICATION LIST DOCUMENTED IN MEDICAL RECORD: ICD-10-PCS | Mod: S$GLB,,, | Performed by: UROLOGY

## 2020-03-31 PROCEDURE — 1101F PR PT FALLS ASSESS DOC 0-1 FALLS W/OUT INJ PAST YR: ICD-10-PCS | Mod: CPTII,S$GLB,, | Performed by: UROLOGY

## 2020-03-31 PROCEDURE — 1101F PT FALLS ASSESS-DOCD LE1/YR: CPT | Mod: CPTII,S$GLB,, | Performed by: UROLOGY

## 2020-03-31 PROCEDURE — 1159F MED LIST DOCD IN RCRD: CPT | Mod: S$GLB,,, | Performed by: UROLOGY

## 2020-03-31 PROCEDURE — 99212 PR OFFICE/OUTPT VISIT, EST, LEVL II, 10-19 MIN: ICD-10-PCS | Mod: 95,,, | Performed by: UROLOGY

## 2020-03-31 NOTE — PROGRESS NOTES
Subjective:       Patient ID: Rod Victor is a 69 y.o. male who was last seen in this office 2/18/2020    Chief Complaint:   No chief complaint on file.    The patient location is: home  The chief complaint leading to consultation is: surgery follow up  Visit type: Virtual visit with synchronous audio and video  Total time spent with patient: 10 min  Each patient to whom he or she provides medical services by telemedicine is:  (1) informed of the relationship between the physician and patient and the respective role of any other health care provider with respect to management of the patient; and (2) notified that he or she may decline to receive medical services by telemedicine and may withdraw from such care at any time.    Notes:       Hydronephrosis  Patient with left flank/LLQ abdominal pain since 10/2019. He was seen by his PCP in mid December. Of note, pain felt to be musculoskeletal in nature vs diverticulitis. Constipation noted on KUB at that time. He was seen in the ER a few weeks later with left sided flank pain that worsened with bending over. Pain again felt to be more musculoskeletal in nature    Seen by PCP last month due to persistent pain. BANG obtained at that time. Imaging showed mild left hydronephrosis, bilateral jets noted. No renal masses or stones noted. Follow up CT scan done recently. Imaging showed moderate left hydronephrosis without hydroureter, ?chronic UPJ obstruction    Now with mild left sided flank pain. Reports pain is relieved when he passes flatus. Reports daily bowel movements--currently taking Metamucil. No previous  surgeries. Denies fever, dysuria or gross hematuria. No hx of tobacco use or kidney stones.    He is back after Cystoscopy with retrograde pyelogram.  He still has intermittent pain on the left.  Overall the pain is improved.  RPG did not demonstrate obstruction.  ACTIVE MEDICAL ISSUES:  Patient Active Problem List   Diagnosis    Left shoulder pain     History of colonic polyps    Strabismus    Rotator cuff tendonitis, left    Nodule of upper lobe of right lung 2 mm incidental on CT 2/2020; nonsmoker. no further testing.    Hydroureter, left    Hydronephrosis of left kidney       ALLERGIES AND MEDICATIONS: updated and reviewed.  Review of patient's allergies indicates:  No Known Allergies  Current Outpatient Medications   Medication Sig    naproxen (NAPROSYN) 375 MG tablet Take 1 tablet (375 mg total) by mouth 2 (two) times daily as needed (pain).    phenazopyridine (PYRIDIUM) 200 MG tablet Take 1 tablet (200 mg total) by mouth 3 (three) times daily as needed for Pain (Burning).     No current facility-administered medications for this visit.        Review of Systems   Constitutional: Negative for activity change, fatigue, fever and unexpected weight change.   HENT: Negative for congestion.    Eyes: Negative for redness.   Respiratory: Negative for chest tightness and shortness of breath.    Cardiovascular: Negative for chest pain and leg swelling.   Gastrointestinal: Negative for abdominal pain, constipation, diarrhea, nausea and vomiting.   Genitourinary: Positive for flank pain. Negative for dysuria, frequency, hematuria, penile pain, penile swelling, scrotal swelling, testicular pain and urgency.   Musculoskeletal: Negative for arthralgias and back pain.   Neurological: Negative for dizziness and light-headedness.   Psychiatric/Behavioral: Negative for behavioral problems and confusion. The patient is not nervous/anxious.    All other systems reviewed and are negative.      Objective:      There were no vitals filed for this visit.  Physical Exam   Constitutional: He is oriented to person, place, and time. He appears well-developed and well-nourished. No distress.   HENT:   Head: Normocephalic.   Eyes: Conjunctivae are normal.   Neck: Normal range of motion.   Pulmonary/Chest: Effort normal. No respiratory distress.   Neurological: He is alert and  oriented to person, place, and time.           Assessment:       1. Hydronephrosis with ureteropelvic junction (UPJ) obstruction    2. Left flank pain          Plan:       1. Hydronephrosis with ureteropelvic junction (UPJ) obstruction  I want to get a more functional study with a CT Urogram to see if there is objective evidence of obstruction.  This may be peripelvic cyst and MSK pain.  - CT Urogram Abd Pelvis W WO; Future  - Basic metabolic panel; Future    2. Left flank pain  As above            Follow up in about 6 weeks (around 5/12/2020) for Review X-ray.

## 2020-04-02 NOTE — ANESTHESIA POSTPROCEDURE EVALUATION
Anesthesia Post Evaluation    Patient: Rod Victor    Procedure(s) Performed: Procedure(s) (LRB):  CYSTOSCOPY, WITH RETROGRADE PYELOGRAM (Bilateral)    Final Anesthesia Type: general    Patient location during evaluation: PACU  Patient participation: Yes- Able to Participate  Level of consciousness: awake and alert  Post-procedure vital signs: reviewed and stable  Pain management: adequate  Airway patency: patent    PONV status at discharge: No PONV  Anesthetic complications: no      Cardiovascular status: blood pressure returned to baseline and hemodynamically stable  Respiratory status: unassisted and spontaneous ventilation  Hydration status: euvolemic  Follow-up not needed.            Event Time     Out of Recovery 07:59:21          Pain/Tessie Score: No data recorded

## 2020-09-17 ENCOUNTER — OFFICE VISIT (OUTPATIENT)
Dept: FAMILY MEDICINE | Facility: CLINIC | Age: 70
End: 2020-09-17
Payer: MEDICARE

## 2020-09-17 DIAGNOSIS — R91.1 PULMONARY NODULE, RIGHT: Primary | ICD-10-CM

## 2020-09-17 PROCEDURE — 99213 OFFICE O/P EST LOW 20 MIN: CPT | Mod: 95,,, | Performed by: INTERNAL MEDICINE

## 2020-09-17 PROCEDURE — 1101F PT FALLS ASSESS-DOCD LE1/YR: CPT | Mod: CPTII,95,, | Performed by: INTERNAL MEDICINE

## 2020-09-17 PROCEDURE — 1101F PR PT FALLS ASSESS DOC 0-1 FALLS W/OUT INJ PAST YR: ICD-10-PCS | Mod: CPTII,95,, | Performed by: INTERNAL MEDICINE

## 2020-09-17 PROCEDURE — 1159F PR MEDICATION LIST DOCUMENTED IN MEDICAL RECORD: ICD-10-PCS | Mod: 95,,, | Performed by: INTERNAL MEDICINE

## 2020-09-17 PROCEDURE — 1159F MED LIST DOCD IN RCRD: CPT | Mod: 95,,, | Performed by: INTERNAL MEDICINE

## 2020-09-17 PROCEDURE — 99213 PR OFFICE/OUTPT VISIT, EST, LEVL III, 20-29 MIN: ICD-10-PCS | Mod: 95,,, | Performed by: INTERNAL MEDICINE

## 2020-09-17 NOTE — PROGRESS NOTES
This note was created by combination of typed  and M-Modal dictation.  Transcription errors may be present.  If there are any questions, please contact me.    Assessment and Plan:   Pulmonary nodule, right  -incidental finding on CT of the abdomen and pelvis.  2 mm.  Nonsmoker, never smoker.  No symptoms.  Long discussion with the patient and his wife.  He is low risk.  Given the size of the nodule and his risk factors I think this does not need further pursuit.      There are no discontinued medications.    meds sent this encounter:       Follow Up: No follow-ups on file.    Subjective:     Chief Complaint   Patient presents with    Lung Tumor       WALKER Velasco is a 69 y.o. male, last appointment with this clinic was Visit date not found.    The patient location is: [place]  The chief complaint leading to consultation is: follow up CT with 2mm pulm nodule  Visit type: Virtual visit with synchronous audio and video  Total time spent with patient: 11 min  Each patient to whom he or she provides medical services by telemedicine is:  (1) informed of the relationship between the physician and patient and the respective role of any other health care provider with respect to management of the patient; and (2) notified that he or she may decline to receive medical services by telemedicine and may withdraw from such care at any time.    Notes:    Previously seen for flank pain. CT showed L hydronephrosis.  Stent placed with urology. Ordered ct urogram.    1/31/20 CT ab/pelvis:  2. 2 mm pulmonary nodule within the right upper lobe versus atelectasis.  For a solid nodule <6 mm, Fleischner Society 2017 guidelines recommend no routine follow up for a low risk patient, or follow-up with non-contrast chest CT at 12 months in a high risk patient.    Flank pain is better. Exercising and stretching.    He is concerned though about the incidental finding of the 2 mm nodule in the CT scan.  Asymptomatic.  Never smoker.   His father used to smoke.  So maybe some secondhand smoke.  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    Answers for HPI/ROS submitted by the patient on 9/15/2020   activity change: No  unexpected weight change: No  rhinorrhea: No  trouble swallowing: No  visual disturbance: No  chest tightness: No  polyuria: No  difficulty urinating: No  joint swelling: No  arthralgias: No  confusion: No  dysphoric mood: No      Patient Care Team:  Scot Thao MD as PCP - General (Internal Medicine)    Patient Active Problem List    Diagnosis Date Noted    Hydronephrosis of left kidney 03/11/2020    Nodule of upper lobe of right lung 2 mm incidental on CT 2/2020; nonsmoker. no further testing. 02/10/2020     2/2020 CT abd/pelvis: 2. 2 mm pulmonary nodule within the right upper lobe versus atelectasis.  For a solid nodule <6 mm, Fleischner Society 2017 guidelines recommend no routine follow up for a low risk patient, or follow-up with non-contrast chest CT at 12 months in a high risk patient.      Hydroureter, left 02/10/2020     1/29/2020 renal US: Mild left hydronephrosis for which clinical and historical correlation is needed. Bilateral ureteral jets are noted.  2/7/2020 CT abd/pelvis without: 1. Moderate left hydronephrosis without hydroureter.  The appearance is nonspecific however may reflect that of chronic UPJ obstruction.  No findings to suggest obstructive uropathy or nephrolithiasis.       History of colonic polyps 10/24/2018     adenoma 2013      Strabismus 10/24/2018    Rotator cuff tendonitis, left 10/24/2018    Left shoulder pain 09/13/2017       PAST MEDICAL HISTORY:  Past Medical History:   Diagnosis Date    History of colonic polyps 10/24/2018    adenoma 2013, polyp 2016 at [facility] - 5 yrs    Rotator cuff tendonitis, left 10/24/2018    Strabismus 10/24/2018       PAST SURGICAL  HISTORY:  Past Surgical History:   Procedure Laterality Date    CYSTOSCOPY W/ RETROGRADES Bilateral 3/11/2020    Procedure: CYSTOSCOPY, WITH RETROGRADE PYELOGRAM;  Surgeon: ANABELL Vásquez MD;  [facility]: [facility] OR;  Service: Urology;  Laterality: Bilateral;  RN PREOP 3/4/2020    NO PAST SURGERIES         SOCIAL HISTORY:  Social History     Socioeconomic History    Marital status:      Spouse name: Not on file    Number of children: Not on file    Years of education: Not on file    Highest education level: Not on file   Occupational History    Not on file   Social Needs    Financial resource strain: Not hard at all    Food insecurity     Worry: Never true     Inability: Never true    Transportation needs     Medical: No     Non-medical: No   Tobacco Use    Smoking status: Never Smoker    Smokeless tobacco: Never Used   Substance and Sexual Activity    Alcohol use: Yes     Frequency: Monthly or less     Drinks per session: Patient refused     Binge frequency: Never     Comment: socially    Drug use: No    Sexual activity: Yes     Partners: Female   Lifestyle    Physical activity     Days per week: 2 days     Minutes per session: 30 min    Stress: Not at all   Relationships    Social connections     Talks on phone: Three times a week     Gets together: Twice a week     Attends Hoahaoism service: Not on file     Active member of club or organization: Yes     Attends meetings of clubs or organizations: Never     Relationship status:    Other Topics Concern    Not on file   Social History Narrative    Not on file       ALLERGIES AND MEDICATIONS: updated and reviewed.  Review of patient's allergies indicates:  No Known Allergies    Medication List with Changes/Refills   Current Medications    NAPROXEN (NAPROSYN) 375 MG TABLET    Take 1 tablet (375 mg total) by mouth 2 (two) times daily as needed (pain).    PHENAZOPYRIDINE (PYRIDIUM) 200 MG TABLET    Take 1 tablet (200 mg total) by mouth 3  (three) times daily as needed for Pain (Burning).        Review of Systems   HENT: Negative for hearing loss.    Eyes: Negative for discharge.   Respiratory: Negative for wheezing.    Cardiovascular: Negative for chest pain and palpitations.   Gastrointestinal: Negative for blood in stool, constipation, diarrhea and vomiting.   Genitourinary: Negative for hematuria and urgency.   Musculoskeletal: Negative for neck pain.   Neurological: Negative for weakness and headaches.   Endo/Heme/Allergies: Negative for polydipsia.       Objective:   Physical Exam   There were no vitals filed for this visit. There is no height or weight on file to calculate BMI.            Physical Exam  Constitutional:       General: He is not in acute distress.     Appearance: Normal appearance. He is not ill-appearing.   HENT:      Head: Normocephalic.   Pulmonary:      Effort: Pulmonary effort is normal.   Neurological:      General: No focal deficit present.      Mental Status: He is alert.   Psychiatric:         Mood and Affect: Mood normal.         Behavior: Behavior normal.         Thought Content: Thought content normal.         Judgment: Judgment normal.

## 2020-10-16 ENCOUNTER — PES CALL (OUTPATIENT)
Dept: ADMINISTRATIVE | Facility: CLINIC | Age: 70
End: 2020-10-16

## 2020-10-19 ENCOUNTER — OFFICE VISIT (OUTPATIENT)
Dept: FAMILY MEDICINE | Facility: CLINIC | Age: 70
End: 2020-10-19
Payer: MEDICARE

## 2020-10-19 DIAGNOSIS — Z00.00 ENCOUNTER FOR PREVENTIVE HEALTH EXAMINATION: Primary | ICD-10-CM

## 2020-10-19 DIAGNOSIS — I70.0 ATHEROSCLEROSIS OF AORTA: ICD-10-CM

## 2020-10-19 DIAGNOSIS — R91.1 NODULE OF UPPER LOBE OF RIGHT LUNG: ICD-10-CM

## 2020-10-19 PROCEDURE — G0439 PR MEDICARE ANNUAL WELLNESS SUBSEQUENT VISIT: ICD-10-PCS | Mod: 95,,, | Performed by: NURSE PRACTITIONER

## 2020-10-19 PROCEDURE — 99999 PR PBB SHADOW E&M-EST. PATIENT-LVL II: ICD-10-PCS | Mod: PBBFAC,,, | Performed by: NURSE PRACTITIONER

## 2020-10-19 PROCEDURE — 99999 PR PBB SHADOW E&M-EST. PATIENT-LVL II: CPT | Mod: PBBFAC,,, | Performed by: NURSE PRACTITIONER

## 2020-10-19 PROCEDURE — G0439 PPPS, SUBSEQ VISIT: HCPCS | Mod: 95,,, | Performed by: NURSE PRACTITIONER

## 2020-10-19 PROCEDURE — 99499 RISK ADDL DX/OHS AUDIT: ICD-10-PCS | Mod: S$GLB,,, | Performed by: NURSE PRACTITIONER

## 2020-10-19 PROCEDURE — 99499 UNLISTED E&M SERVICE: CPT | Mod: S$GLB,,, | Performed by: NURSE PRACTITIONER

## 2020-10-20 PROBLEM — I70.0 ATHEROSCLEROSIS OF AORTA: Status: ACTIVE | Noted: 2020-10-20

## 2020-10-20 NOTE — PROGRESS NOTES
The patient location is: [place], [place]   The chief complaint leading to consultation is: >25 minutes    Visit type: audiovisual    Face to Face time with patient: >25 minutes of total time spent on the encounter, which includes face to face time and non-face to face time preparing to see the patient (eg, review of tests), Obtaining and/or reviewing separately obtained history, Documenting clinical information in the electronic or other health record, Independently interpreting results (not separately reported) and communicating results to the patient/family/caregiver, or Care coordination (not separately reported).         Each patient to whom he or she provides medical services by telemedicine is:  (1) informed of the relationship between the physician and patient and the respective role of any other health care provider with respect to management of the patient; and (2) notified that he or she may decline to receive medical services by telemedicine and may withdraw from such care at any time.    Notes:       Rod Victor presented for a  Medicare AWV and comprehensive Health Risk Assessment today. The following components were reviewed and updated:    · Medical history  · Family History  · Social history  · Allergies and Current Medications  · Health Risk Assessment  · Health Maintenance  · Care Team     ** See Completed Assessments for Annual Wellness Visit within the encounter summary.**         The following assessments were completed:  · Living Situation  · CAGE  · Depression Screening  · Fall Risk Assessment (MACH 10)  · Hearing Assessment(HHI)  · Cognitive Function Screening  · Nutrition Screening  · ADL Screening  · PAQ Screening      There were no vitals filed for this visit.  There is no height or weight on file to calculate BMI.  Physical Exam  Constitutional:       Appearance: Normal appearance.   Neck:      Musculoskeletal: Normal range of motion.   Pulmonary:      Effort: Pulmonary  effort is normal. No respiratory distress.   Musculoskeletal: Normal range of motion.   Neurological:      Mental Status: He is alert.   Psychiatric:         Mood and Affect: Mood normal.         Thought Content: Thought content normal.           Diagnoses and health risks identified today and associated recommendations/orders:    1. Encounter for preventive health examination  Education provided about preventive health examinations and procedures; addressed and discussed patient's health concerns. Additionally, reviewed medical record for risk factors and documented the results during this encounter.    2. Atherosclerosis of aorta  Stable, patient engaged in structured fitness activities; reports a fiber enriched diet that supports his health. Continue current treatment plan as previously prescribed.     3. Nodule of upper lobe of right lung 2 mm incidental on CT 2/2020; nonsmoker. no further testing.  Stable and monitored. Continue current treatment plan as previously prescribed.       Provided Rod with a 5-10 year written screening schedule and personal prevention plan. Recommendations were developed using the USPSTF age appropriate recommendations. Education, counseling, and referrals were provided as needed. After Visit Summary printed and given to patient which includes a list of additional screenings\tests needed.    Follow up in about 1 year (around 10/19/2021) for assessment.    Joseluis Engle Jr, NP  I offered to discuss end of life issues, including information on how to make advance directives that the patient could use to name someone who would make medical decisions on their behalf if they became too ill to make themselves.    ___Patient declined  _X_Patient is interested.

## 2020-10-20 NOTE — PATIENT INSTRUCTIONS
Counseling and Referral of Other Preventative  (Italic type indicates deductible and co-insurance are waived)    Patient Name: Rod Victor  Today's Date: 10/20/2020    Health Maintenance       Date Due Completion Date    TETANUS VACCINE 10/25/1970 ---    Pneumococcal Vaccine (65+ Low/Medium Risk) (2 of 2 - PPSV23) 05/16/2020 5/16/2019    Colorectal Cancer Screening 05/25/2021 5/25/2016    Lipid Panel 10/30/2023 10/30/2018        No orders of the defined types were placed in this encounter.    The following information is provided to all patients.  This information is to help you find resources for any of the problems found today that may be affecting your health:                Living healthy guide: www.[facility].[place].gov      Understanding Diabetes: www.diabetes.org      Eating healthy: www.cdc.gov/healthyweight      Formerly named [facility] home safety checklist: www.cdc.gov/steadi/patient.html      Agency on Aging: www.goea.[place].[facility]      Alcoholics anonymous (AA): www.aa.org      Physical Activity: www.anne.nih.gov/ap4mkmv      Tobacco use: www.quitwithusla.org

## 2021-05-14 ENCOUNTER — HOSPITAL ENCOUNTER (EMERGENCY)
Facility: HOSPITAL | Age: 71
Discharge: HOME | End: 2021-05-14
Attending: EMERGENCY MEDICINE
Payer: MEDICARE

## 2021-05-14 VITALS
OXYGEN SATURATION: 98 % | TEMPERATURE: 99 F | HEIGHT: 70 IN | RESPIRATION RATE: 18 BRPM | HEART RATE: 79 BPM | DIASTOLIC BLOOD PRESSURE: 77 MMHG | SYSTOLIC BLOOD PRESSURE: 151 MMHG | BODY MASS INDEX: 28.77 KG/M2 | WEIGHT: 201 LBS

## 2021-05-14 DIAGNOSIS — R60.0 LOWER EXTREMITY EDEMA: Primary | ICD-10-CM

## 2021-05-14 DIAGNOSIS — I44.0 FIRST DEGREE AV BLOCK: ICD-10-CM

## 2021-05-14 LAB
ALBUMIN SERPL BCP-MCNC: 4 G/DL (ref 3.5–5.2)
ALP SERPL-CCNC: 85 U/L (ref 55–135)
ALT SERPL W/O P-5'-P-CCNC: 23 U/L (ref 10–44)
ANION GAP SERPL CALC-SCNC: 6 MMOL/L (ref 8–16)
AST SERPL-CCNC: 19 U/L (ref 10–40)
BASOPHILS # BLD AUTO: 0.03 K/UL (ref 0–0.2)
BASOPHILS NFR BLD: 0.4 % (ref 0–1.9)
BILIRUB SERPL-MCNC: 0.6 MG/DL (ref 0.1–1)
BILIRUB UR QL STRIP: NEGATIVE
BNP SERPL-MCNC: 76 PG/ML (ref 0–99)
BUN SERPL-MCNC: 11 MG/DL (ref 8–23)
CALCIUM SERPL-MCNC: 9.2 MG/DL (ref 8.7–10.5)
CHLORIDE SERPL-SCNC: 107 MMOL/L (ref 95–110)
CLARITY UR: CLEAR
CO2 SERPL-SCNC: 28 MMOL/L (ref 23–29)
COLOR UR: COLORLESS
CREAT SERPL-MCNC: 0.9 MG/DL (ref 0.5–1.4)
DIFFERENTIAL METHOD: ABNORMAL
EOSINOPHIL # BLD AUTO: 0.1 K/UL (ref 0–0.5)
EOSINOPHIL NFR BLD: 1.2 % (ref 0–8)
ERYTHROCYTE [DISTWIDTH] IN BLOOD BY AUTOMATED COUNT: 12.2 % (ref 11.5–14.5)
EST. GFR  (AFRICAN AMERICAN): >60 ML/MIN/1.73 M^2
EST. GFR  (NON AFRICAN AMERICAN): >60 ML/MIN/1.73 M^2
GLUCOSE SERPL-MCNC: 85 MG/DL (ref 70–110)
GLUCOSE UR QL STRIP: NEGATIVE
HCT VFR BLD AUTO: 43.3 % (ref 40–54)
HGB BLD-MCNC: 15.3 G/DL (ref 14–18)
HGB UR QL STRIP: NEGATIVE
IMM GRANULOCYTES # BLD AUTO: 0.02 K/UL (ref 0–0.04)
IMM GRANULOCYTES NFR BLD AUTO: 0.3 % (ref 0–0.5)
INR PPP: 1.1 (ref 0.8–1.2)
KETONES UR QL STRIP: NEGATIVE
LEUKOCYTE ESTERASE UR QL STRIP: NEGATIVE
LYMPHOCYTES # BLD AUTO: 1.3 K/UL (ref 1–4.8)
LYMPHOCYTES NFR BLD: 17.5 % (ref 18–48)
MCH RBC QN AUTO: 34.1 PG (ref 27–31)
MCHC RBC AUTO-ENTMCNC: 35.3 G/DL (ref 32–36)
MCV RBC AUTO: 96 FL (ref 82–98)
MONOCYTES # BLD AUTO: 0.7 K/UL (ref 0.3–1)
MONOCYTES NFR BLD: 9.7 % (ref 4–15)
NEUTROPHILS # BLD AUTO: 5.2 K/UL (ref 1.8–7.7)
NEUTROPHILS NFR BLD: 70.9 % (ref 38–73)
NITRITE UR QL STRIP: NEGATIVE
NRBC BLD-RTO: 0 /100 WBC
PH UR STRIP: 5 [PH] (ref 5–8)
PLATELET # BLD AUTO: 225 K/UL (ref 150–450)
PMV BLD AUTO: 10.7 FL (ref 9.2–12.9)
POTASSIUM SERPL-SCNC: 4.7 MMOL/L (ref 3.5–5.1)
PROT SERPL-MCNC: 7.7 G/DL (ref 6–8.4)
PROT UR QL STRIP: NEGATIVE
PROTHROMBIN TIME: 11.2 SEC (ref 9–12.5)
RBC # BLD AUTO: 4.49 M/UL (ref 4.6–6.2)
SODIUM SERPL-SCNC: 141 MMOL/L (ref 136–145)
SP GR UR STRIP: 1 (ref 1–1.03)
TROPONIN I SERPL DL<=0.01 NG/ML-MCNC: 0.01 NG/ML (ref 0–0.03)
URN SPEC COLLECT METH UR: ABNORMAL
UROBILINOGEN UR STRIP-ACNC: NEGATIVE EU/DL
WBC # BLD AUTO: 7.3 K/UL (ref 3.9–12.7)

## 2021-05-14 PROCEDURE — 93010 EKG 12-LEAD: ICD-10-PCS | Mod: ,,, | Performed by: INTERNAL MEDICINE

## 2021-05-14 PROCEDURE — 93010 ELECTROCARDIOGRAM REPORT: CPT | Mod: ,,, | Performed by: INTERNAL MEDICINE

## 2021-05-14 PROCEDURE — 93005 ELECTROCARDIOGRAM TRACING: CPT

## 2021-05-14 PROCEDURE — 99285 EMERGENCY DEPT VISIT HI MDM: CPT | Mod: 25

## 2021-05-14 PROCEDURE — 83880 ASSAY OF NATRIURETIC PEPTIDE: CPT | Performed by: NURSE PRACTITIONER

## 2021-05-14 PROCEDURE — 81003 URINALYSIS AUTO W/O SCOPE: CPT | Performed by: NURSE PRACTITIONER

## 2021-05-14 PROCEDURE — 84484 ASSAY OF TROPONIN QUANT: CPT | Performed by: NURSE PRACTITIONER

## 2021-05-14 PROCEDURE — 85025 COMPLETE CBC W/AUTO DIFF WBC: CPT | Performed by: NURSE PRACTITIONER

## 2021-05-14 PROCEDURE — 85610 PROTHROMBIN TIME: CPT | Performed by: NURSE PRACTITIONER

## 2021-05-14 PROCEDURE — 80053 COMPREHEN METABOLIC PANEL: CPT | Performed by: NURSE PRACTITIONER

## 2021-05-18 ENCOUNTER — LAB VISIT (OUTPATIENT)
Dept: LAB | Facility: HOSPITAL | Age: 71
End: 2021-05-18
Attending: INTERNAL MEDICINE
Payer: MEDICARE

## 2021-05-18 ENCOUNTER — OFFICE VISIT (OUTPATIENT)
Dept: FAMILY MEDICINE | Facility: CLINIC | Age: 71
End: 2021-05-18
Payer: MEDICARE

## 2021-05-18 VITALS
SYSTOLIC BLOOD PRESSURE: 140 MMHG | HEIGHT: 70 IN | BODY MASS INDEX: 29.68 KG/M2 | HEART RATE: 114 BPM | DIASTOLIC BLOOD PRESSURE: 82 MMHG | WEIGHT: 207.31 LBS | TEMPERATURE: 99 F | OXYGEN SATURATION: 96 %

## 2021-05-18 DIAGNOSIS — I49.9 CARDIAC ARRHYTHMIA, UNSPECIFIED CARDIAC ARRHYTHMIA TYPE: ICD-10-CM

## 2021-05-18 DIAGNOSIS — G89.29 CHRONIC LEFT-SIDED LOW BACK PAIN WITHOUT SCIATICA: Primary | ICD-10-CM

## 2021-05-18 DIAGNOSIS — N13.4 HYDROURETER, LEFT: ICD-10-CM

## 2021-05-18 DIAGNOSIS — Z23 NEED FOR VACCINATION FOR STREP PNEUMONIAE: ICD-10-CM

## 2021-05-18 DIAGNOSIS — M54.50 CHRONIC LEFT-SIDED LOW BACK PAIN WITHOUT SCIATICA: Primary | ICD-10-CM

## 2021-05-18 DIAGNOSIS — Z23 NEED FOR TDAP VACCINATION: ICD-10-CM

## 2021-05-18 LAB — TSH SERPL DL<=0.005 MIU/L-ACNC: 1.25 UIU/ML (ref 0.4–4)

## 2021-05-18 PROCEDURE — 99214 OFFICE O/P EST MOD 30 MIN: CPT | Mod: 25,S$GLB,, | Performed by: INTERNAL MEDICINE

## 2021-05-18 PROCEDURE — G0009 PNEUMOCOCCAL POLYSACCHARIDE VACCINE 23-VALENT =>2YO SQ IM: ICD-10-PCS | Mod: 59,S$GLB,, | Performed by: INTERNAL MEDICINE

## 2021-05-18 PROCEDURE — 36415 COLL VENOUS BLD VENIPUNCTURE: CPT | Mod: PO | Performed by: INTERNAL MEDICINE

## 2021-05-18 PROCEDURE — 3288F FALL RISK ASSESSMENT DOCD: CPT | Mod: CPTII,S$GLB,, | Performed by: INTERNAL MEDICINE

## 2021-05-18 PROCEDURE — 90715 TDAP VACCINE GREATER THAN OR EQUAL TO 7YO IM: ICD-10-PCS | Mod: S$GLB,,, | Performed by: INTERNAL MEDICINE

## 2021-05-18 PROCEDURE — 99214 PR OFFICE/OUTPT VISIT, EST, LEVL IV, 30-39 MIN: ICD-10-PCS | Mod: 25,S$GLB,, | Performed by: INTERNAL MEDICINE

## 2021-05-18 PROCEDURE — 3288F PR FALLS RISK ASSESSMENT DOCUMENTED: ICD-10-PCS | Mod: CPTII,S$GLB,, | Performed by: INTERNAL MEDICINE

## 2021-05-18 PROCEDURE — 99999 PR PBB SHADOW E&M-EST. PATIENT-LVL IV: CPT | Mod: PBBFAC,,, | Performed by: INTERNAL MEDICINE

## 2021-05-18 PROCEDURE — 90732 PPSV23 VACC 2 YRS+ SUBQ/IM: CPT | Mod: S$GLB,,, | Performed by: INTERNAL MEDICINE

## 2021-05-18 PROCEDURE — 99999 PR PBB SHADOW E&M-EST. PATIENT-LVL IV: ICD-10-PCS | Mod: PBBFAC,,, | Performed by: INTERNAL MEDICINE

## 2021-05-18 PROCEDURE — 90471 TDAP VACCINE GREATER THAN OR EQUAL TO 7YO IM: ICD-10-PCS | Mod: S$GLB,,, | Performed by: INTERNAL MEDICINE

## 2021-05-18 PROCEDURE — 84443 ASSAY THYROID STIM HORMONE: CPT | Performed by: INTERNAL MEDICINE

## 2021-05-18 PROCEDURE — 90732 PNEUMOCOCCAL POLYSACCHARIDE VACCINE 23-VALENT =>2YO SQ IM: ICD-10-PCS | Mod: S$GLB,,, | Performed by: INTERNAL MEDICINE

## 2021-05-18 PROCEDURE — 1159F PR MEDICATION LIST DOCUMENTED IN MEDICAL RECORD: ICD-10-PCS | Mod: S$GLB,,, | Performed by: INTERNAL MEDICINE

## 2021-05-18 PROCEDURE — 1126F PR PAIN SEVERITY QUANTIFIED, NO PAIN PRESENT: ICD-10-PCS | Mod: S$GLB,,, | Performed by: INTERNAL MEDICINE

## 2021-05-18 PROCEDURE — 90471 IMMUNIZATION ADMIN: CPT | Mod: S$GLB,,, | Performed by: INTERNAL MEDICINE

## 2021-05-18 PROCEDURE — 1159F MED LIST DOCD IN RCRD: CPT | Mod: S$GLB,,, | Performed by: INTERNAL MEDICINE

## 2021-05-18 PROCEDURE — 1101F PR PT FALLS ASSESS DOC 0-1 FALLS W/OUT INJ PAST YR: ICD-10-PCS | Mod: CPTII,S$GLB,, | Performed by: INTERNAL MEDICINE

## 2021-05-18 PROCEDURE — 3008F PR BODY MASS INDEX (BMI) DOCUMENTED: ICD-10-PCS | Mod: CPTII,S$GLB,, | Performed by: INTERNAL MEDICINE

## 2021-05-18 PROCEDURE — 90715 TDAP VACCINE 7 YRS/> IM: CPT | Mod: S$GLB,,, | Performed by: INTERNAL MEDICINE

## 2021-05-18 PROCEDURE — G0009 ADMIN PNEUMOCOCCAL VACCINE: HCPCS | Mod: 59,S$GLB,, | Performed by: INTERNAL MEDICINE

## 2021-05-18 PROCEDURE — 3008F BODY MASS INDEX DOCD: CPT | Mod: CPTII,S$GLB,, | Performed by: INTERNAL MEDICINE

## 2021-05-18 PROCEDURE — 1126F AMNT PAIN NOTED NONE PRSNT: CPT | Mod: S$GLB,,, | Performed by: INTERNAL MEDICINE

## 2021-05-18 PROCEDURE — 1101F PT FALLS ASSESS-DOCD LE1/YR: CPT | Mod: CPTII,S$GLB,, | Performed by: INTERNAL MEDICINE

## 2021-05-18 RX ORDER — DICLOFENAC SODIUM 10 MG/G
GEL TOPICAL
Qty: 100 G | Refills: 0 | Status: SHIPPED | OUTPATIENT
Start: 2021-05-18 | End: 2022-06-17

## 2021-05-19 ENCOUNTER — OFFICE VISIT (OUTPATIENT)
Dept: CARDIOLOGY | Facility: CLINIC | Age: 71
End: 2021-05-19
Payer: MEDICARE

## 2021-05-19 VITALS
HEIGHT: 70 IN | HEART RATE: 98 BPM | BODY MASS INDEX: 29.73 KG/M2 | SYSTOLIC BLOOD PRESSURE: 130 MMHG | OXYGEN SATURATION: 100 % | DIASTOLIC BLOOD PRESSURE: 84 MMHG | WEIGHT: 207.69 LBS

## 2021-05-19 DIAGNOSIS — R06.09 DOE (DYSPNEA ON EXERTION): ICD-10-CM

## 2021-05-19 DIAGNOSIS — I49.9 CARDIAC ARRHYTHMIA, UNSPECIFIED CARDIAC ARRHYTHMIA TYPE: ICD-10-CM

## 2021-05-19 DIAGNOSIS — R00.2 PALPITATIONS: ICD-10-CM

## 2021-05-19 PROCEDURE — 93000 EKG 12-LEAD: ICD-10-PCS | Mod: S$GLB,,, | Performed by: INTERNAL MEDICINE

## 2021-05-19 PROCEDURE — 3008F BODY MASS INDEX DOCD: CPT | Mod: CPTII,S$GLB,, | Performed by: INTERNAL MEDICINE

## 2021-05-19 PROCEDURE — 1126F PR PAIN SEVERITY QUANTIFIED, NO PAIN PRESENT: ICD-10-PCS | Mod: S$GLB,,, | Performed by: INTERNAL MEDICINE

## 2021-05-19 PROCEDURE — 99204 PR OFFICE/OUTPT VISIT, NEW, LEVL IV, 45-59 MIN: ICD-10-PCS | Mod: 25,S$GLB,, | Performed by: INTERNAL MEDICINE

## 2021-05-19 PROCEDURE — 3288F PR FALLS RISK ASSESSMENT DOCUMENTED: ICD-10-PCS | Mod: CPTII,S$GLB,, | Performed by: INTERNAL MEDICINE

## 2021-05-19 PROCEDURE — 1159F MED LIST DOCD IN RCRD: CPT | Mod: S$GLB,,, | Performed by: INTERNAL MEDICINE

## 2021-05-19 PROCEDURE — 1126F AMNT PAIN NOTED NONE PRSNT: CPT | Mod: S$GLB,,, | Performed by: INTERNAL MEDICINE

## 2021-05-19 PROCEDURE — 1159F PR MEDICATION LIST DOCUMENTED IN MEDICAL RECORD: ICD-10-PCS | Mod: S$GLB,,, | Performed by: INTERNAL MEDICINE

## 2021-05-19 PROCEDURE — 3288F FALL RISK ASSESSMENT DOCD: CPT | Mod: CPTII,S$GLB,, | Performed by: INTERNAL MEDICINE

## 2021-05-19 PROCEDURE — 1101F PT FALLS ASSESS-DOCD LE1/YR: CPT | Mod: CPTII,S$GLB,, | Performed by: INTERNAL MEDICINE

## 2021-05-19 PROCEDURE — 99999 PR PBB SHADOW E&M-EST. PATIENT-LVL IV: ICD-10-PCS | Mod: PBBFAC,,, | Performed by: INTERNAL MEDICINE

## 2021-05-19 PROCEDURE — 99204 OFFICE O/P NEW MOD 45 MIN: CPT | Mod: 25,S$GLB,, | Performed by: INTERNAL MEDICINE

## 2021-05-19 PROCEDURE — 93000 ELECTROCARDIOGRAM COMPLETE: CPT | Mod: S$GLB,,, | Performed by: INTERNAL MEDICINE

## 2021-05-19 PROCEDURE — 3008F PR BODY MASS INDEX (BMI) DOCUMENTED: ICD-10-PCS | Mod: CPTII,S$GLB,, | Performed by: INTERNAL MEDICINE

## 2021-05-19 PROCEDURE — 99999 PR PBB SHADOW E&M-EST. PATIENT-LVL IV: CPT | Mod: PBBFAC,,, | Performed by: INTERNAL MEDICINE

## 2021-05-19 PROCEDURE — 1101F PR PT FALLS ASSESS DOC 0-1 FALLS W/OUT INJ PAST YR: ICD-10-PCS | Mod: CPTII,S$GLB,, | Performed by: INTERNAL MEDICINE

## 2021-06-03 ENCOUNTER — HOSPITAL ENCOUNTER (OUTPATIENT)
Dept: CARDIOLOGY | Facility: HOSPITAL | Age: 71
Discharge: HOME | End: 2021-06-03
Attending: INTERNAL MEDICINE
Payer: MEDICARE

## 2021-06-03 VITALS
WEIGHT: 207 LBS | SYSTOLIC BLOOD PRESSURE: 130 MMHG | BODY MASS INDEX: 29.63 KG/M2 | HEIGHT: 70 IN | DIASTOLIC BLOOD PRESSURE: 84 MMHG

## 2021-06-03 DIAGNOSIS — I49.9 CARDIAC ARRHYTHMIA, UNSPECIFIED CARDIAC ARRHYTHMIA TYPE: ICD-10-CM

## 2021-06-03 DIAGNOSIS — R00.2 PALPITATIONS: ICD-10-CM

## 2021-06-03 DIAGNOSIS — R06.09 DOE (DYSPNEA ON EXERTION): ICD-10-CM

## 2021-06-03 LAB
AORTIC ROOT ANNULUS: 3.13 CM
AORTIC VALVE CUSP SEPERATION: 1.82 CM
ASCENDING AORTA: 3.71 CM
AV INDEX (PROSTH): 0.78
AV MEAN GRADIENT: 4 MMHG
AV PEAK GRADIENT: 8 MMHG
AV VALVE AREA: 3.62 CM2
AV VELOCITY RATIO: 0.83
BSA FOR ECHO PROCEDURE: 2.15 M2
CV ECHO LV RWT: 0.75 CM
DOP CALC AO PEAK VEL: 1.39 M/S
DOP CALC AO VTI: 28 CM
DOP CALC LVOT AREA: 4.6 CM2
DOP CALC LVOT DIAMETER: 2.43 CM
DOP CALC LVOT PEAK VEL: 1.16 M/S
DOP CALC LVOT STROKE VOLUME: 101.33 CM3
DOP CALCLVOT PEAK VEL VTI: 21.86 CM
E WAVE DECELERATION TIME: 230.95 MSEC
E/A RATIO: 1.49
E/E' RATIO: 11.42 M/S
ECHO LV POSTERIOR WALL: 1.28 CM (ref 0.6–1.1)
EJECTION FRACTION: 55 %
FRACTIONAL SHORTENING: 29 % (ref 28–44)
INTERVENTRICULAR SEPTUM: 1.22 CM (ref 0.6–1.1)
IVRT: 62.28 MSEC
LA MAJOR: 4.01 CM
LA MINOR: 3.86 CM
LA WIDTH: 4.06 CM
LEFT ATRIUM SIZE: 3.59 CM
LEFT ATRIUM VOLUME INDEX: 23 ML/M2
LEFT ATRIUM VOLUME: 48.73 CM3
LEFT INTERNAL DIMENSION IN SYSTOLE: 2.44 CM (ref 2.1–4)
LEFT VENTRICLE DIASTOLIC VOLUME INDEX: 22.89 ML/M2
LEFT VENTRICLE DIASTOLIC VOLUME: 48.52 ML
LEFT VENTRICLE MASS INDEX: 66 G/M2
LEFT VENTRICLE SYSTOLIC VOLUME INDEX: 9.9 ML/M2
LEFT VENTRICLE SYSTOLIC VOLUME: 21.04 ML
LEFT VENTRICULAR INTERNAL DIMENSION IN DIASTOLE: 3.43 CM (ref 3.5–6)
LEFT VENTRICULAR MASS: 140.52 G
LV LATERAL E/E' RATIO: 13.7 M/S
LV SEPTAL E/E' RATIO: 9.79 M/S
MV PEAK A VEL: 0.92 M/S
MV PEAK E VEL: 1.37 M/S
PISA TR MAX VEL: 2.6 M/S
PV PEAK VELOCITY: 1.18 CM/S
RA MAJOR: 3.35 CM
RA PRESSURE: 8 MMHG
RA WIDTH: 2.74 CM
RIGHT VENTRICULAR END-DIASTOLIC DIMENSION: 3.12 CM
RV TISSUE DOPPLER FREE WALL SYSTOLIC VELOCITY 1 (APICAL 4 CHAMBER VIEW): 11.75 CM/S
SINUS: 3.35 CM
STJ: 2.78 CM
TDI LATERAL: 0.1 M/S
TDI SEPTAL: 0.14 M/S
TDI: 0.12 M/S
TR MAX PG: 27 MMHG
TRICUSPID ANNULAR PLANE SYSTOLIC EXCURSION: 1.98 CM
TV REST PULMONARY ARTERY PRESSURE: 35 MMHG

## 2021-06-03 PROCEDURE — 93306 TTE W/DOPPLER COMPLETE: CPT

## 2021-06-03 PROCEDURE — 93306 ECHO (CUPID ONLY): ICD-10-PCS | Mod: 26,,, | Performed by: INTERNAL MEDICINE

## 2021-06-03 PROCEDURE — 93227 XTRNL ECG REC<48 HR R&I: CPT | Mod: ,,, | Performed by: INTERNAL MEDICINE

## 2021-06-03 PROCEDURE — 93306 TTE W/DOPPLER COMPLETE: CPT | Mod: 26,,, | Performed by: INTERNAL MEDICINE

## 2021-06-03 PROCEDURE — 93227 HOLTER MONITOR - 24 HOUR (CUPID ONLY): ICD-10-PCS | Mod: ,,, | Performed by: INTERNAL MEDICINE

## 2021-06-03 PROCEDURE — 93225 XTRNL ECG REC<48 HRS REC: CPT

## 2021-06-04 ENCOUNTER — HOSPITAL ENCOUNTER (OUTPATIENT)
Dept: RADIOLOGY | Facility: HOSPITAL | Age: 71
Discharge: HOME | End: 2021-06-04
Attending: INTERNAL MEDICINE
Payer: MEDICARE

## 2021-06-04 DIAGNOSIS — N13.4 HYDROURETER, LEFT: ICD-10-CM

## 2021-06-04 PROBLEM — N13.30 HYDRONEPHROSIS OF LEFT KIDNEY: Status: RESOLVED | Noted: 2020-03-11 | Resolved: 2021-06-04

## 2021-06-04 PROBLEM — N28.1 RENAL CYST: Status: ACTIVE | Noted: 2020-02-10

## 2021-06-04 PROCEDURE — 25500020 PHARM REV CODE 255: Performed by: INTERNAL MEDICINE

## 2021-06-04 PROCEDURE — 74178 CT ABD&PLV WO CNTR FLWD CNTR: CPT | Mod: TC

## 2021-06-04 PROCEDURE — 74178 CT ABD&PLV WO CNTR FLWD CNTR: CPT | Mod: 26,,, | Performed by: RADIOLOGY

## 2021-06-04 PROCEDURE — 74178 CT UROGRAM ABD PELVIS W WO: ICD-10-PCS | Mod: 26,,, | Performed by: RADIOLOGY

## 2021-06-04 RX ADMIN — IOHEXOL 125 ML: 350 INJECTION, SOLUTION INTRAVENOUS at 10:06

## 2021-06-07 LAB
OHS CV EVENT MONITOR DAY: 0
OHS CV HOLTER LENGTH DECIMAL HOURS: 23.98
OHS CV HOLTER LENGTH HOURS: 23
OHS CV HOLTER LENGTH MINUTES: 59

## 2021-06-08 ENCOUNTER — PATIENT OUTREACH (OUTPATIENT)
Dept: ADMINISTRATIVE | Facility: OTHER | Age: 71
End: 2021-06-08

## 2021-06-10 ENCOUNTER — OFFICE VISIT (OUTPATIENT)
Dept: CARDIOLOGY | Facility: CLINIC | Age: 71
End: 2021-06-10
Payer: MEDICARE

## 2021-06-10 VITALS
SYSTOLIC BLOOD PRESSURE: 154 MMHG | HEIGHT: 70 IN | OXYGEN SATURATION: 97 % | WEIGHT: 210 LBS | HEART RATE: 110 BPM | DIASTOLIC BLOOD PRESSURE: 90 MMHG | BODY MASS INDEX: 30.06 KG/M2

## 2021-06-10 DIAGNOSIS — R06.09 DOE (DYSPNEA ON EXERTION): ICD-10-CM

## 2021-06-10 DIAGNOSIS — R00.2 PALPITATIONS: Primary | ICD-10-CM

## 2021-06-10 PROCEDURE — 3008F PR BODY MASS INDEX (BMI) DOCUMENTED: ICD-10-PCS | Mod: CPTII,S$GLB,, | Performed by: INTERNAL MEDICINE

## 2021-06-10 PROCEDURE — 1101F PR PT FALLS ASSESS DOC 0-1 FALLS W/OUT INJ PAST YR: ICD-10-PCS | Mod: CPTII,S$GLB,, | Performed by: INTERNAL MEDICINE

## 2021-06-10 PROCEDURE — 1101F PT FALLS ASSESS-DOCD LE1/YR: CPT | Mod: CPTII,S$GLB,, | Performed by: INTERNAL MEDICINE

## 2021-06-10 PROCEDURE — 99214 OFFICE O/P EST MOD 30 MIN: CPT | Mod: S$GLB,,, | Performed by: INTERNAL MEDICINE

## 2021-06-10 PROCEDURE — 99999 PR PBB SHADOW E&M-EST. PATIENT-LVL III: CPT | Mod: PBBFAC,,, | Performed by: INTERNAL MEDICINE

## 2021-06-10 PROCEDURE — 3288F FALL RISK ASSESSMENT DOCD: CPT | Mod: CPTII,S$GLB,, | Performed by: INTERNAL MEDICINE

## 2021-06-10 PROCEDURE — 1159F PR MEDICATION LIST DOCUMENTED IN MEDICAL RECORD: ICD-10-PCS | Mod: S$GLB,,, | Performed by: INTERNAL MEDICINE

## 2021-06-10 PROCEDURE — 1159F MED LIST DOCD IN RCRD: CPT | Mod: S$GLB,,, | Performed by: INTERNAL MEDICINE

## 2021-06-10 PROCEDURE — 1126F AMNT PAIN NOTED NONE PRSNT: CPT | Mod: S$GLB,,, | Performed by: INTERNAL MEDICINE

## 2021-06-10 PROCEDURE — 99214 PR OFFICE/OUTPT VISIT, EST, LEVL IV, 30-39 MIN: ICD-10-PCS | Mod: S$GLB,,, | Performed by: INTERNAL MEDICINE

## 2021-06-10 PROCEDURE — 3008F BODY MASS INDEX DOCD: CPT | Mod: CPTII,S$GLB,, | Performed by: INTERNAL MEDICINE

## 2021-06-10 PROCEDURE — 99999 PR PBB SHADOW E&M-EST. PATIENT-LVL III: ICD-10-PCS | Mod: PBBFAC,,, | Performed by: INTERNAL MEDICINE

## 2021-06-10 PROCEDURE — 1126F PR PAIN SEVERITY QUANTIFIED, NO PAIN PRESENT: ICD-10-PCS | Mod: S$GLB,,, | Performed by: INTERNAL MEDICINE

## 2021-06-10 PROCEDURE — 3288F PR FALLS RISK ASSESSMENT DOCUMENTED: ICD-10-PCS | Mod: CPTII,S$GLB,, | Performed by: INTERNAL MEDICINE

## 2021-06-10 RX ORDER — METOPROLOL SUCCINATE 25 MG/1
25 TABLET, EXTENDED RELEASE ORAL DAILY
Qty: 90 TABLET | Refills: 3 | Status: SHIPPED | OUTPATIENT
Start: 2021-06-10 | End: 2021-12-15 | Stop reason: SDUPTHER

## 2021-07-16 ENCOUNTER — PATIENT OUTREACH (OUTPATIENT)
Dept: ADMINISTRATIVE | Facility: HOSPITAL | Age: 71
End: 2021-07-16

## 2021-07-19 ENCOUNTER — TELEPHONE (OUTPATIENT)
Dept: FAMILY MEDICINE | Facility: CLINIC | Age: 71
End: 2021-07-19

## 2021-07-19 ENCOUNTER — TELEPHONE (OUTPATIENT)
Dept: CARDIOLOGY | Facility: CLINIC | Age: 71
End: 2021-07-19

## 2021-10-11 ENCOUNTER — PES CALL (OUTPATIENT)
Dept: ADMINISTRATIVE | Facility: CLINIC | Age: 71
End: 2021-10-11

## 2021-10-14 ENCOUNTER — PES CALL (OUTPATIENT)
Dept: ADMINISTRATIVE | Facility: CLINIC | Age: 71
End: 2021-10-14

## 2021-12-14 ENCOUNTER — PATIENT OUTREACH (OUTPATIENT)
Dept: ADMINISTRATIVE | Facility: OTHER | Age: 71
End: 2021-12-14
Payer: MEDICARE

## 2021-12-15 ENCOUNTER — OFFICE VISIT (OUTPATIENT)
Dept: CARDIOLOGY | Facility: CLINIC | Age: 71
End: 2021-12-15
Payer: MEDICARE

## 2021-12-15 VITALS
HEIGHT: 70 IN | WEIGHT: 209 LBS | HEART RATE: 95 BPM | DIASTOLIC BLOOD PRESSURE: 90 MMHG | BODY MASS INDEX: 29.92 KG/M2 | OXYGEN SATURATION: 97 % | SYSTOLIC BLOOD PRESSURE: 144 MMHG

## 2021-12-15 DIAGNOSIS — I70.0 ATHEROSCLEROSIS OF AORTA: Primary | ICD-10-CM

## 2021-12-15 DIAGNOSIS — R00.2 PALPITATIONS: ICD-10-CM

## 2021-12-15 DIAGNOSIS — R06.09 DOE (DYSPNEA ON EXERTION): ICD-10-CM

## 2021-12-15 PROCEDURE — 99999 PR PBB SHADOW E&M-EST. PATIENT-LVL III: ICD-10-PCS | Mod: PBBFAC,,, | Performed by: INTERNAL MEDICINE

## 2021-12-15 PROCEDURE — 99999 PR PBB SHADOW E&M-EST. PATIENT-LVL III: CPT | Mod: PBBFAC,,, | Performed by: INTERNAL MEDICINE

## 2021-12-15 PROCEDURE — 99214 OFFICE O/P EST MOD 30 MIN: CPT | Mod: S$GLB,,, | Performed by: INTERNAL MEDICINE

## 2021-12-15 PROCEDURE — 93000 EKG 12-LEAD: ICD-10-PCS | Mod: S$GLB,,, | Performed by: INTERNAL MEDICINE

## 2021-12-15 PROCEDURE — 99214 PR OFFICE/OUTPT VISIT, EST, LEVL IV, 30-39 MIN: ICD-10-PCS | Mod: S$GLB,,, | Performed by: INTERNAL MEDICINE

## 2021-12-15 PROCEDURE — 99499 UNLISTED E&M SERVICE: CPT | Mod: S$GLB,,, | Performed by: INTERNAL MEDICINE

## 2021-12-15 PROCEDURE — 99499 RISK ADDL DX/OHS AUDIT: ICD-10-PCS | Mod: S$GLB,,, | Performed by: INTERNAL MEDICINE

## 2021-12-15 PROCEDURE — 93000 ELECTROCARDIOGRAM COMPLETE: CPT | Mod: S$GLB,,, | Performed by: INTERNAL MEDICINE

## 2021-12-15 RX ORDER — METOPROLOL SUCCINATE 50 MG/1
50 TABLET, EXTENDED RELEASE ORAL DAILY
Qty: 90 TABLET | Refills: 3 | Status: SHIPPED | OUTPATIENT
Start: 2021-12-15 | End: 2023-01-17

## 2022-01-25 ENCOUNTER — PES CALL (OUTPATIENT)
Dept: ADMINISTRATIVE | Facility: CLINIC | Age: 72
End: 2022-01-25
Payer: MEDICARE

## 2022-02-01 ENCOUNTER — PES CALL (OUTPATIENT)
Dept: ADMINISTRATIVE | Facility: CLINIC | Age: 72
End: 2022-02-01
Payer: MEDICARE

## 2022-02-07 ENCOUNTER — PES CALL (OUTPATIENT)
Dept: ADMINISTRATIVE | Facility: CLINIC | Age: 72
End: 2022-02-07
Payer: MEDICARE

## 2022-05-31 ENCOUNTER — PATIENT MESSAGE (OUTPATIENT)
Dept: ADMINISTRATIVE | Facility: HOSPITAL | Age: 72
End: 2022-05-31
Payer: MEDICARE

## 2022-06-16 PROBLEM — R06.09 DOE (DYSPNEA ON EXERTION): Status: RESOLVED | Noted: 2021-05-19 | Resolved: 2022-06-16

## 2022-06-16 NOTE — PROGRESS NOTES
This note was created by combination of typed  and M-Modal dictation.  Transcription errors may be present.  If there are any questions, please contact me.    Assessment and Plan:   Encounter for preventive health examination  Screening for prostate cancer  History of colonic polyps  -check future labs.  PSA this year and then aged out of further testing  Colonoscopy July 2021 with polyp, no path report included.  Plan for repeat in 2026  -     CBC Auto Differential; Future; Expected date: 12/17/2022  -     Comprehensive Metabolic Panel; Future; Expected date: 12/17/2022  -     Lipid Panel; Future; Expected date: 12/17/2022  -     PSA, Screening; Future; Expected date: 12/17/2022    Atherosclerosis of aorta  -reviewed results of incidental finding.  Discussed options-aggressive therapy with statin, no therapy, or further testing to check for coronary calcium scoring.  He is initially interested in medication management and then on further discussion would like to talk with his wife about it.  I have printed a copy of the report and I have went ahead and sent in statin.  If he wants to take it go ahead and take it.  If he decides he does not want to I have asked him to contact me notify me.  Otherwise plan for labs in 6 months on new start statin.  -     CBC Auto Differential; Future; Expected date: 12/17/2022  -     Comprehensive Metabolic Panel; Future; Expected date: 12/17/2022  -     Lipid Panel; Future; Expected date: 12/17/2022  -     atorvastatin (LIPITOR) 20 MG tablet; Take 1 tablet (20 mg total) by mouth once daily.  Dispense: 90 tablet; Refill: 3    Palpitations, holter PACs; toprol  -on metoprolol, has follow-up with Cardiology.  -     CBC Auto Differential; Future; Expected date: 12/17/2022      Medications Discontinued During This Encounter   Medication Reason    naproxen (NAPROSYN) 375 MG tablet Patient no longer taking    diclofenac sodium (VOLTAREN) 1 % Gel Patient no longer taking     phenazopyridine (PYRIDIUM) 200 MG tablet Patient no longer taking       meds sent this encounter:  Medications Ordered This Encounter   Medications    atorvastatin (LIPITOR) 20 MG tablet     Sig: Take 1 tablet (20 mg total) by mouth once daily.     Dispense:  90 tablet     Refill:  3       Follow Up: No follow-ups on file.  Fasting labs 6 months on new start statin.  If he decides not to start it he should contact me.  Else, physical exam 1 year    Subjective:     Chief Complaint   Patient presents with    Annual Exam       HPI  Rod is a 71 y.o. male, last appointment with this clinic was Visit date not found.  Social History     Tobacco Use    Smoking status: Never Smoker    Smokeless tobacco: Never Used   Substance Use Topics    Alcohol use: Yes     Comment: socially          Social History     Social History Narrative    Not on file       Last visit with me May 2021.  At the time left-sided low back pain.  Arrhythmia during colonoscopy.  Referred to Cardiology.  Subsequently seen and evaluated by Cardiology.  06/03/2021 TTE LV normal size with concentric hypertrophy and normal systolic function LVEF 55%.  Normal diastolic function.  Normal RV size and systolic function.  Intermediate CVP 8. PA pressure 35.   06/03/2021 Holter 4 beat run of VT.  PACs.  Started on Toprol XL for the PACs  Hydroureter incidental finding for left-sided musculoskeletal back pain.  Evaluation with Urology.  Cystoscopy.  Renal ultrasound.  Plan was CT urogram.  Subsequent CT urogram 06/04/2021,No evidence of hydronephrosis. Appearance on previous CT and ultrasound was related to parapelvic cysts evident on current exam.  07/09/2021 colonoscopy sigmoid, descending diverticulosis.  Internal hemorrhoids.  2 mm sigmoid polyp.  Path not included.  Incidental aortic atherosclerosis.  Last lipid profile 2018 non     Notes incidentally sometimes when he is watching TV was feet propped up, sometimes he might feel up fleeting  "shooting pain in 1 ft or the other.  It is not the entire foot and could be in a toe or maybe the ball of the foot.  Never occurs with activity.  Maybe once a month or maybe every 2 months.    Discussed incidental finding of aortic atherosclerosis on his CT imaging scan.  Discussed that I cannot quantify how much.  Discussed aggressive therapy with statin verses further testing verses no medication treatment.  He is initially interested in medication management but then wants to discuss the findings with his wife.    Answers for HPI/ROS submitted by the patient on 6/15/2022  activity change: No  unexpected weight change: No  neck pain: No  hearing loss: No  rhinorrhea: No  trouble swallowing: No  eye discharge: No  visual disturbance: No  chest tightness: No  wheezing: No  chest pain: No  palpitations: No  blood in stool: No  constipation: No  vomiting: No  diarrhea: No  polydipsia: No  difficulty urinating: No  urgency: No  hematuria: No  joint swelling: No  arthralgias: No  headaches: No  weakness: No  confusion: No  dysphoric mood: No        Patient Care Team:  Scot Thao MD as PCP - General (Internal Medicine)  Angela Triplett MA as Care Coordinator  Preston Mercer MD as Consulting Physician (Gastroenterology)    Patient Active Problem List    Diagnosis Date Noted    Palpitations 05/19/2021    GARCIA (dyspnea on exertion) 05/19/2021    Atherosclerosis of aorta 10/20/2020     " Atherosclerotic calcification is noted of the aorta and its branches" - CT Abdomen Pelvis Without Contrast 2-7-2020      Nodule of upper lobe of right lung 2 mm incidental on CT 2/2020; nonsmoker. no further testing. 02/10/2020     2/2020 CT abd/pelvis: 2. 2 mm pulmonary nodule within the right upper lobe versus atelectasis.  For a solid nodule <6 mm, Fleischner Society 2017 guidelines recommend no routine follow up for a low risk patient, or follow-up with non-contrast chest CT at 12 months in a high risk patient.   "    Renal parapelvic cyst giving the appearance of hydronephrosis, not truly hydronephrosis.  no further testing/monitoring 02/10/2020     1/29/2020 renal US: Mild left hydronephrosis for which clinical and historical correlation is needed. Bilateral ureteral jets are noted.  2/7/2020 CT abd/pelvis without: 1. Moderate left hydronephrosis without hydroureter.  The appearance is nonspecific however may reflect that of chronic UPJ obstruction.  No findings to suggest obstructive uropathy or nephrolithiasis.   6/4/21 CT urogram: No evidence of hydronephrosis.  Appearance on previous CT and ultrasound was related to parapelvic cysts evident on current exam.  Colonic diverticula without evidence of diverticulitis  Small fat containing umbilical hernia.      History of colonic polyps 10/24/2018     adenoma 2013      Strabismus 10/24/2018    Rotator cuff tendonitis, left 10/24/2018    Left shoulder pain 09/13/2017       PAST MEDICAL PROBLEMS, PAST SURGICAL HISTORY: please see relevant portions of the electronic medical record    ALLERGIES AND MEDICATIONS: updated and reviewed.  Review of patient's allergies indicates:  No Known Allergies    Medication List with Changes/Refills   Current Medications    DICLOFENAC SODIUM (VOLTAREN) 1 % GEL    Apply the gel (2 g) to the affected area 4 times daily. Do not apply more than 8 g daily    METOPROLOL SUCCINATE (TOPROL-XL) 50 MG 24 HR TABLET    Take 1 tablet (50 mg total) by mouth once daily.    NAPROXEN (NAPROSYN) 375 MG TABLET    Take 1 tablet (375 mg total) by mouth 2 (two) times daily as needed (pain).    PHENAZOPYRIDINE (PYRIDIUM) 200 MG TABLET    Take 1 tablet (200 mg total) by mouth 3 (three) times daily as needed for Pain (Burning).      Review of Systems   HENT: Negative for hearing loss.    Eyes: Negative for discharge.   Respiratory: Negative for wheezing.    Cardiovascular: Negative for chest pain and palpitations.   Gastrointestinal: Negative for blood in stool,  "constipation, diarrhea and vomiting.   Genitourinary: Negative for hematuria and urgency.   Musculoskeletal: Negative for neck pain.   Neurological: Negative for weakness and headaches.   Endo/Heme/Allergies: Negative for polydipsia.       Objective:   Physical Exam   Vitals:    06/17/22 1534   BP: 138/80   BP Location: Right arm   Patient Position: Sitting   BP Method: Medium (Manual)   Pulse: 94   Temp: 97.7 °F (36.5 °C)   TempSrc: Oral   SpO2: 96%   Weight: 97 kg (213 lb 15.3 oz)   Height: 5' 10" (1.778 m)    Body mass index is 30.7 kg/m².            Physical Exam  Constitutional:       Appearance: Normal appearance. He is well-developed.   HENT:      Right Ear: Tympanic membrane and external ear normal.      Left Ear: Tympanic membrane and external ear normal.      Nose: Nose normal.   Eyes:      General: No scleral icterus.     Conjunctiva/sclera: Conjunctivae normal.      Comments: Left strabismus   Neck:      Thyroid: No thyroid mass or thyromegaly.      Trachea: Trachea normal.   Cardiovascular:      Rate and Rhythm: Normal rate and regular rhythm.      Heart sounds: Normal heart sounds, S1 normal and S2 normal. No murmur heard.  Pulmonary:      Effort: Pulmonary effort is normal.      Breath sounds: Normal breath sounds.   Abdominal:      General: There is no distension.      Palpations: Abdomen is soft. There is no hepatomegaly, splenomegaly or mass.      Tenderness: There is no abdominal tenderness.   Musculoskeletal:         General: No deformity.      Right lower leg: No edema.      Left lower leg: No edema.   Lymphadenopathy:      Cervical: No cervical adenopathy.   Skin:     General: Skin is warm and dry.      Findings: No rash (on exposed skin).      Comments: On exposed skin   Neurological:      Mental Status: He is alert and oriented to person, place, and time.      Cranial Nerves: No cranial nerve deficit.      Sensory: No sensory deficit.      Deep Tendon Reflexes: Reflexes are normal and " symmetric.   Psychiatric:         Speech: Speech normal.         Behavior: Behavior normal.         Thought Content: Thought content normal.         Judgment: Judgment normal.

## 2022-06-17 ENCOUNTER — OFFICE VISIT (OUTPATIENT)
Dept: FAMILY MEDICINE | Facility: CLINIC | Age: 72
End: 2022-06-17
Payer: MEDICARE

## 2022-06-17 VITALS
WEIGHT: 213.94 LBS | OXYGEN SATURATION: 96 % | HEART RATE: 94 BPM | HEIGHT: 70 IN | BODY MASS INDEX: 30.63 KG/M2 | SYSTOLIC BLOOD PRESSURE: 138 MMHG | DIASTOLIC BLOOD PRESSURE: 80 MMHG | TEMPERATURE: 98 F

## 2022-06-17 DIAGNOSIS — Z86.010 HISTORY OF COLONIC POLYPS: ICD-10-CM

## 2022-06-17 DIAGNOSIS — R00.2 PALPITATIONS: ICD-10-CM

## 2022-06-17 DIAGNOSIS — Z12.5 SCREENING FOR PROSTATE CANCER: ICD-10-CM

## 2022-06-17 DIAGNOSIS — I70.0 ATHEROSCLEROSIS OF AORTA: ICD-10-CM

## 2022-06-17 DIAGNOSIS — Z00.00 ENCOUNTER FOR PREVENTIVE HEALTH EXAMINATION: Primary | ICD-10-CM

## 2022-06-17 PROCEDURE — 99999 PR PBB SHADOW E&M-EST. PATIENT-LVL IV: CPT | Mod: PBBFAC,,, | Performed by: INTERNAL MEDICINE

## 2022-06-17 PROCEDURE — 3079F DIAST BP 80-89 MM HG: CPT | Mod: CPTII,S$GLB,, | Performed by: INTERNAL MEDICINE

## 2022-06-17 PROCEDURE — 1159F MED LIST DOCD IN RCRD: CPT | Mod: CPTII,S$GLB,, | Performed by: INTERNAL MEDICINE

## 2022-06-17 PROCEDURE — 3075F SYST BP GE 130 - 139MM HG: CPT | Mod: CPTII,S$GLB,, | Performed by: INTERNAL MEDICINE

## 2022-06-17 PROCEDURE — 1101F PR PT FALLS ASSESS DOC 0-1 FALLS W/OUT INJ PAST YR: ICD-10-PCS | Mod: CPTII,S$GLB,, | Performed by: INTERNAL MEDICINE

## 2022-06-17 PROCEDURE — 3288F FALL RISK ASSESSMENT DOCD: CPT | Mod: CPTII,S$GLB,, | Performed by: INTERNAL MEDICINE

## 2022-06-17 PROCEDURE — 99999 PR PBB SHADOW E&M-EST. PATIENT-LVL IV: ICD-10-PCS | Mod: PBBFAC,,, | Performed by: INTERNAL MEDICINE

## 2022-06-17 PROCEDURE — 99397 PR PREVENTIVE VISIT,EST,65 & OVER: ICD-10-PCS | Mod: GY,S$GLB,, | Performed by: INTERNAL MEDICINE

## 2022-06-17 PROCEDURE — 3008F PR BODY MASS INDEX (BMI) DOCUMENTED: ICD-10-PCS | Mod: CPTII,S$GLB,, | Performed by: INTERNAL MEDICINE

## 2022-06-17 PROCEDURE — 3075F PR MOST RECENT SYSTOLIC BLOOD PRESS GE 130-139MM HG: ICD-10-PCS | Mod: CPTII,S$GLB,, | Performed by: INTERNAL MEDICINE

## 2022-06-17 PROCEDURE — 3288F PR FALLS RISK ASSESSMENT DOCUMENTED: ICD-10-PCS | Mod: CPTII,S$GLB,, | Performed by: INTERNAL MEDICINE

## 2022-06-17 PROCEDURE — 3008F BODY MASS INDEX DOCD: CPT | Mod: CPTII,S$GLB,, | Performed by: INTERNAL MEDICINE

## 2022-06-17 PROCEDURE — 1101F PT FALLS ASSESS-DOCD LE1/YR: CPT | Mod: CPTII,S$GLB,, | Performed by: INTERNAL MEDICINE

## 2022-06-17 PROCEDURE — 1160F RVW MEDS BY RX/DR IN RCRD: CPT | Mod: CPTII,S$GLB,, | Performed by: INTERNAL MEDICINE

## 2022-06-17 PROCEDURE — 1126F AMNT PAIN NOTED NONE PRSNT: CPT | Mod: CPTII,S$GLB,, | Performed by: INTERNAL MEDICINE

## 2022-06-17 PROCEDURE — 1159F PR MEDICATION LIST DOCUMENTED IN MEDICAL RECORD: ICD-10-PCS | Mod: CPTII,S$GLB,, | Performed by: INTERNAL MEDICINE

## 2022-06-17 PROCEDURE — 99397 PER PM REEVAL EST PAT 65+ YR: CPT | Mod: GY,S$GLB,, | Performed by: INTERNAL MEDICINE

## 2022-06-17 PROCEDURE — 3079F PR MOST RECENT DIASTOLIC BLOOD PRESSURE 80-89 MM HG: ICD-10-PCS | Mod: CPTII,S$GLB,, | Performed by: INTERNAL MEDICINE

## 2022-06-17 PROCEDURE — 1126F PR PAIN SEVERITY QUANTIFIED, NO PAIN PRESENT: ICD-10-PCS | Mod: CPTII,S$GLB,, | Performed by: INTERNAL MEDICINE

## 2022-06-17 PROCEDURE — 1160F PR REVIEW ALL MEDS BY PRESCRIBER/CLIN PHARMACIST DOCUMENTED: ICD-10-PCS | Mod: CPTII,S$GLB,, | Performed by: INTERNAL MEDICINE

## 2022-06-17 RX ORDER — ATORVASTATIN CALCIUM 20 MG/1
20 TABLET, FILM COATED ORAL DAILY
Qty: 90 TABLET | Refills: 3 | Status: SHIPPED | OUTPATIENT
Start: 2022-06-17 | End: 2023-06-07

## 2022-06-22 ENCOUNTER — OFFICE VISIT (OUTPATIENT)
Dept: CARDIOLOGY | Facility: CLINIC | Age: 72
End: 2022-06-22
Payer: MEDICARE

## 2022-06-22 VITALS
WEIGHT: 213.88 LBS | RESPIRATION RATE: 19 BRPM | HEART RATE: 93 BPM | DIASTOLIC BLOOD PRESSURE: 88 MMHG | OXYGEN SATURATION: 97 % | BODY MASS INDEX: 30.62 KG/M2 | HEIGHT: 70 IN | SYSTOLIC BLOOD PRESSURE: 132 MMHG

## 2022-06-22 DIAGNOSIS — I70.0 ATHEROSCLEROSIS OF AORTA: Primary | ICD-10-CM

## 2022-06-22 DIAGNOSIS — R00.2 PALPITATIONS: ICD-10-CM

## 2022-06-22 PROCEDURE — 3008F BODY MASS INDEX DOCD: CPT | Mod: CPTII,S$GLB,, | Performed by: INTERNAL MEDICINE

## 2022-06-22 PROCEDURE — 1126F AMNT PAIN NOTED NONE PRSNT: CPT | Mod: CPTII,S$GLB,, | Performed by: INTERNAL MEDICINE

## 2022-06-22 PROCEDURE — 99999 PR PBB SHADOW E&M-EST. PATIENT-LVL III: CPT | Mod: PBBFAC,,, | Performed by: INTERNAL MEDICINE

## 2022-06-22 PROCEDURE — 99499 UNLISTED E&M SERVICE: CPT | Mod: S$GLB,,, | Performed by: INTERNAL MEDICINE

## 2022-06-22 PROCEDURE — 1159F PR MEDICATION LIST DOCUMENTED IN MEDICAL RECORD: ICD-10-PCS | Mod: CPTII,S$GLB,, | Performed by: INTERNAL MEDICINE

## 2022-06-22 PROCEDURE — 3288F FALL RISK ASSESSMENT DOCD: CPT | Mod: CPTII,S$GLB,, | Performed by: INTERNAL MEDICINE

## 2022-06-22 PROCEDURE — 93000 EKG 12-LEAD: ICD-10-PCS | Mod: S$GLB,,, | Performed by: INTERNAL MEDICINE

## 2022-06-22 PROCEDURE — 99214 PR OFFICE/OUTPT VISIT, EST, LEVL IV, 30-39 MIN: ICD-10-PCS | Mod: S$GLB,,, | Performed by: INTERNAL MEDICINE

## 2022-06-22 PROCEDURE — 1101F PR PT FALLS ASSESS DOC 0-1 FALLS W/OUT INJ PAST YR: ICD-10-PCS | Mod: CPTII,S$GLB,, | Performed by: INTERNAL MEDICINE

## 2022-06-22 PROCEDURE — 1101F PT FALLS ASSESS-DOCD LE1/YR: CPT | Mod: CPTII,S$GLB,, | Performed by: INTERNAL MEDICINE

## 2022-06-22 PROCEDURE — 3008F PR BODY MASS INDEX (BMI) DOCUMENTED: ICD-10-PCS | Mod: CPTII,S$GLB,, | Performed by: INTERNAL MEDICINE

## 2022-06-22 PROCEDURE — 1126F PR PAIN SEVERITY QUANTIFIED, NO PAIN PRESENT: ICD-10-PCS | Mod: CPTII,S$GLB,, | Performed by: INTERNAL MEDICINE

## 2022-06-22 PROCEDURE — 3075F PR MOST RECENT SYSTOLIC BLOOD PRESS GE 130-139MM HG: ICD-10-PCS | Mod: CPTII,S$GLB,, | Performed by: INTERNAL MEDICINE

## 2022-06-22 PROCEDURE — 3075F SYST BP GE 130 - 139MM HG: CPT | Mod: CPTII,S$GLB,, | Performed by: INTERNAL MEDICINE

## 2022-06-22 PROCEDURE — 99214 OFFICE O/P EST MOD 30 MIN: CPT | Mod: S$GLB,,, | Performed by: INTERNAL MEDICINE

## 2022-06-22 PROCEDURE — 93000 ELECTROCARDIOGRAM COMPLETE: CPT | Mod: S$GLB,,, | Performed by: INTERNAL MEDICINE

## 2022-06-22 PROCEDURE — 3288F PR FALLS RISK ASSESSMENT DOCUMENTED: ICD-10-PCS | Mod: CPTII,S$GLB,, | Performed by: INTERNAL MEDICINE

## 2022-06-22 PROCEDURE — 3079F PR MOST RECENT DIASTOLIC BLOOD PRESSURE 80-89 MM HG: ICD-10-PCS | Mod: CPTII,S$GLB,, | Performed by: INTERNAL MEDICINE

## 2022-06-22 PROCEDURE — 1159F MED LIST DOCD IN RCRD: CPT | Mod: CPTII,S$GLB,, | Performed by: INTERNAL MEDICINE

## 2022-06-22 PROCEDURE — 99999 PR PBB SHADOW E&M-EST. PATIENT-LVL III: ICD-10-PCS | Mod: PBBFAC,,, | Performed by: INTERNAL MEDICINE

## 2022-06-22 PROCEDURE — 3079F DIAST BP 80-89 MM HG: CPT | Mod: CPTII,S$GLB,, | Performed by: INTERNAL MEDICINE

## 2022-06-22 PROCEDURE — 99499 RISK ADDL DX/OHS AUDIT: ICD-10-PCS | Mod: S$GLB,,, | Performed by: INTERNAL MEDICINE

## 2022-06-22 NOTE — PROGRESS NOTES
Subjective:    Patient ID:  Rod Victor is a 71 y.o. male who presents for follow-up of Follow-up      HPI     Referred by Dr Buckley  ER 5/14 for LE edema.  Troponin negative. BNP WNL.   EKG WNL. CXR no acute process.  CBC, CMP WNL. UA normal     He thinks that the edema was due to salt intake.  Negative workup as above.     Having some left-sided low back pain.  It is a cramping sensation.  After standing for periods of time.  He puts cold packs on it and it does seem to help.  Lays on a heat pad in the mornings as well.  He had previously complained about this.  Felt to be musculoskeletal but the workup for this to rule out intra-abdominal pathology showed incidental hydronephrosis for which he has undergone workup with Urology.  At last urology visit, plan was to round out the workup with a CT urogram.  And then coronavirus came and so he never got that done.     He does not do any sort specific stretches for his back in general.     Went for colonoscopy and was told he had arrhythmia.  Was at .  [facility].  It is unclear what the arrhythmia was.  He was told he needed clearance in regards to the arrhythmia.  Denies symptomatic palpitations, jitteriness, lightheadedness, chest pain, shortness of breath.      EKG 5/14/21 NSR - ok     Echo 6/3/21  · The left ventricle is normal in size with concentric hypertrophy and normal systolic function.  · The estimated ejection fraction is 55%.  · Normal left ventricular diastolic function.  · Normal right ventricular size with normal right ventricular systolic function.  · Mild left atrial enlargement.  · Intermediate central venous pressure (8 mmHg).  · The estimated PA systolic pressure is 35 mmHg.     Holter 6/3/21  · Sinus rhythm with heart rates varying between 56 and 138 bpm with an average of 85 bpm  · There were very rare PVC  · Four beat run of VT.  · There were very frequent PACs (6826)  · Short runs of atrial tachycardia. Longest 13 beats.     5/19/21  Denies prior cardiac Hx. Mild GARCIA. Denies CP  BP controlled  EKG NSR frequent PACs and blocked PACs    Echo and holter for frequent PACs and GARCIA     6/10/21 Denies CP or SOB. Occasional palpitations    Referred by Dr Buckley   Add toprol XL 25 qd for frequent PACs  Cleared for colonoscopy  OV 6 months     12/15/21 Denies CP or SOB. BP/HR mildly elevated  EKG NSR NSSTT changes  Increase toprol XL for BP and palpitations  Continue Rx for HTN, atherosclerosis, HTN  OV 6 months    6/22/22 Denies CP, mild GARCIA. Denies palpitations  EKG NSR NSSTT changes    Review of Systems   Constitutional: Negative for decreased appetite.   HENT: Negative for ear discharge.    Eyes: Negative for blurred vision.   Endocrine: Negative for polyphagia.   Skin: Negative for nail changes.   Genitourinary: Negative for bladder incontinence.   Neurological: Negative for aphonia.   Psychiatric/Behavioral: Negative for hallucinations.   Allergic/Immunologic: Negative for hives.        Objective:    Physical Exam  Constitutional:       Appearance: He is well-developed.   HENT:      Head: Normocephalic and atraumatic.   Eyes:      Conjunctiva/sclera: Conjunctivae normal.      Pupils: Pupils are equal, round, and reactive to light.   Cardiovascular:      Rate and Rhythm: Normal rate.      Pulses: Intact distal pulses.      Heart sounds: Normal heart sounds.   Pulmonary:      Effort: Pulmonary effort is normal.      Breath sounds: Normal breath sounds.   Abdominal:      General: Bowel sounds are normal.      Palpations: Abdomen is soft.   Musculoskeletal:         General: Normal range of motion.      Cervical back: Normal range of motion and neck supple.   Skin:     General: Skin is warm and dry.   Neurological:      Mental Status: He is alert and oriented to person, place, and time.           Assessment:       1. Atherosclerosis of aorta    2. Palpitations, holter PACs; toprol         Plan:       Continue Rx for HTN, atherosclerosis, HLD,  palpitations  OV 1 year

## 2022-07-07 ENCOUNTER — PES CALL (OUTPATIENT)
Dept: ADMINISTRATIVE | Facility: CLINIC | Age: 72
End: 2022-07-07
Payer: MEDICARE

## 2022-07-15 ENCOUNTER — TELEPHONE (OUTPATIENT)
Dept: ADMINISTRATIVE | Facility: CLINIC | Age: 72
End: 2022-07-15
Payer: MEDICARE

## 2022-07-15 NOTE — TELEPHONE ENCOUNTER
Called pt; informed pt I was calling to let him know we needed to cancel his eawv appt on 7/19/22 because he already completed a visit with PHN on 1/5/22; pt verbalized understanding; appt canceled

## 2022-08-16 ENCOUNTER — PES CALL (OUTPATIENT)
Dept: ADMINISTRATIVE | Facility: CLINIC | Age: 72
End: 2022-08-16
Payer: MEDICARE

## 2022-12-09 ENCOUNTER — PES CALL (OUTPATIENT)
Dept: ADMINISTRATIVE | Facility: CLINIC | Age: 72
End: 2022-12-09
Payer: MEDICARE

## 2022-12-21 DIAGNOSIS — E78.5 DYSLIPIDEMIA: ICD-10-CM

## 2022-12-21 DIAGNOSIS — Z00.00 ENCOUNTER FOR PREVENTIVE HEALTH EXAMINATION: Primary | ICD-10-CM

## 2023-01-05 ENCOUNTER — TELEPHONE (OUTPATIENT)
Dept: ADMINISTRATIVE | Facility: CLINIC | Age: 73
End: 2023-01-05
Payer: MEDICARE

## 2023-01-09 ENCOUNTER — OFFICE VISIT (OUTPATIENT)
Dept: FAMILY MEDICINE | Facility: CLINIC | Age: 73
End: 2023-01-09
Payer: MEDICARE

## 2023-01-09 VITALS
TEMPERATURE: 98 F | WEIGHT: 214.19 LBS | SYSTOLIC BLOOD PRESSURE: 146 MMHG | OXYGEN SATURATION: 97 % | BODY MASS INDEX: 30.66 KG/M2 | HEIGHT: 70 IN | DIASTOLIC BLOOD PRESSURE: 88 MMHG | HEART RATE: 76 BPM

## 2023-01-09 DIAGNOSIS — R91.1 NODULE OF UPPER LOBE OF RIGHT LUNG: ICD-10-CM

## 2023-01-09 DIAGNOSIS — E78.5 DYSLIPIDEMIA: ICD-10-CM

## 2023-01-09 DIAGNOSIS — Z86.010 HISTORY OF COLONIC POLYPS: ICD-10-CM

## 2023-01-09 DIAGNOSIS — Z00.00 ENCOUNTER FOR PREVENTIVE HEALTH EXAMINATION: Primary | ICD-10-CM

## 2023-01-09 DIAGNOSIS — I70.0 ATHEROSCLEROSIS OF AORTA: ICD-10-CM

## 2023-01-09 PROCEDURE — 1170F FXNL STATUS ASSESSED: CPT | Mod: CPTII,S$GLB,, | Performed by: NURSE PRACTITIONER

## 2023-01-09 PROCEDURE — 3077F PR MOST RECENT SYSTOLIC BLOOD PRESSURE >= 140 MM HG: ICD-10-PCS | Mod: CPTII,S$GLB,, | Performed by: NURSE PRACTITIONER

## 2023-01-09 PROCEDURE — 1159F MED LIST DOCD IN RCRD: CPT | Mod: CPTII,S$GLB,, | Performed by: NURSE PRACTITIONER

## 2023-01-09 PROCEDURE — 1101F PT FALLS ASSESS-DOCD LE1/YR: CPT | Mod: CPTII,S$GLB,, | Performed by: NURSE PRACTITIONER

## 2023-01-09 PROCEDURE — 3008F BODY MASS INDEX DOCD: CPT | Mod: CPTII,S$GLB,, | Performed by: NURSE PRACTITIONER

## 2023-01-09 PROCEDURE — 3079F PR MOST RECENT DIASTOLIC BLOOD PRESSURE 80-89 MM HG: ICD-10-PCS | Mod: CPTII,S$GLB,, | Performed by: NURSE PRACTITIONER

## 2023-01-09 PROCEDURE — 1160F PR REVIEW ALL MEDS BY PRESCRIBER/CLIN PHARMACIST DOCUMENTED: ICD-10-PCS | Mod: CPTII,S$GLB,, | Performed by: NURSE PRACTITIONER

## 2023-01-09 PROCEDURE — 3077F SYST BP >= 140 MM HG: CPT | Mod: CPTII,S$GLB,, | Performed by: NURSE PRACTITIONER

## 2023-01-09 PROCEDURE — G0439 PR MEDICARE ANNUAL WELLNESS SUBSEQUENT VISIT: ICD-10-PCS | Mod: S$GLB,,, | Performed by: NURSE PRACTITIONER

## 2023-01-09 PROCEDURE — 3288F FALL RISK ASSESSMENT DOCD: CPT | Mod: CPTII,S$GLB,, | Performed by: NURSE PRACTITIONER

## 2023-01-09 PROCEDURE — 1160F RVW MEDS BY RX/DR IN RCRD: CPT | Mod: CPTII,S$GLB,, | Performed by: NURSE PRACTITIONER

## 2023-01-09 PROCEDURE — 99999 PR PBB SHADOW E&M-EST. PATIENT-LVL IV: ICD-10-PCS | Mod: PBBFAC,,, | Performed by: NURSE PRACTITIONER

## 2023-01-09 PROCEDURE — 3008F PR BODY MASS INDEX (BMI) DOCUMENTED: ICD-10-PCS | Mod: CPTII,S$GLB,, | Performed by: NURSE PRACTITIONER

## 2023-01-09 PROCEDURE — 1101F PR PT FALLS ASSESS DOC 0-1 FALLS W/OUT INJ PAST YR: ICD-10-PCS | Mod: CPTII,S$GLB,, | Performed by: NURSE PRACTITIONER

## 2023-01-09 PROCEDURE — 1170F PR FUNCTIONAL STATUS ASSESSED: ICD-10-PCS | Mod: CPTII,S$GLB,, | Performed by: NURSE PRACTITIONER

## 2023-01-09 PROCEDURE — 3079F DIAST BP 80-89 MM HG: CPT | Mod: CPTII,S$GLB,, | Performed by: NURSE PRACTITIONER

## 2023-01-09 PROCEDURE — 1159F PR MEDICATION LIST DOCUMENTED IN MEDICAL RECORD: ICD-10-PCS | Mod: CPTII,S$GLB,, | Performed by: NURSE PRACTITIONER

## 2023-01-09 PROCEDURE — G0439 PPPS, SUBSEQ VISIT: HCPCS | Mod: S$GLB,,, | Performed by: NURSE PRACTITIONER

## 2023-01-09 PROCEDURE — 99999 PR PBB SHADOW E&M-EST. PATIENT-LVL IV: CPT | Mod: PBBFAC,,, | Performed by: NURSE PRACTITIONER

## 2023-01-09 PROCEDURE — 3288F PR FALLS RISK ASSESSMENT DOCUMENTED: ICD-10-PCS | Mod: CPTII,S$GLB,, | Performed by: NURSE PRACTITIONER

## 2023-01-09 NOTE — PATIENT INSTRUCTIONS
Counseling and Referral of Other Preventative  (Italic type indicates deductible and co-insurance are waived)    Patient Name: Rod Victor  Today's Date: 1/9/2023    Health Maintenance         Date Due Completion Date    Hemoglobin A1c (Diabetic Prevention Screening) Never done ---    COVID-19 Vaccine (4 - Booster for Moderna series) 01/03/2022 11/8/2021    Influenza Vaccine (1) 09/01/2022 10/22/2021    High Dose Statin 06/22/2023 6/22/2022    Colorectal Cancer Screening 07/09/2026 7/9/2021    Lipid Panel 12/19/2027 12/19/2022    TETANUS VACCINE 05/18/2031 5/18/2021          No orders of the defined types were placed in this encounter.      The following information is provided to all patients.  This information is to help you find resources for any of the problems found today that may be affecting your health:                Living healthy guide: www.[facility].[place].[facility]      Understanding Diabetes: www.diabetes.org      Eating healthy: www.cdc.gov/healthyweight      [facility] home safety checklist: www.cdc.gov/steadi/patient.html      Agency on Aging: www.goea.[place].[facility]      Alcoholics anonymous (AA): www.aa.org      Physical Activity: www.anne.nih.gov/pg1aykh      Tobacco use: www.quitwithusla.org       Please call [employer] at [phone] to receive a rewards card for completing your Annual Wellness Visit. Thanks

## 2023-01-09 NOTE — PROGRESS NOTES
"  Rod Victor presented for a  Medicare AWV and comprehensive Health Risk Assessment today. The following components were reviewed and updated:    Medical history  Family History  Social history  Allergies and Current Medications  Health Risk Assessment  Health Maintenance  Care Team       ** See Completed Assessments for Annual Wellness Visit within the encounter summary.**       The following assessments were completed:  Living Situation  CAGE  Depression Screening  Timed Get Up and Go  Whisper Test  Cognitive Function Screening  Nutrition Screening  ADL Screening  PAQ Screening            Vitals:    01/09/23 1356   BP: (!) 146/88   Pulse: 76   Temp: 98.1 °F (36.7 °C)   TempSrc: Oral   SpO2: 97%   Weight: 97.1 kg (214 lb 2.8 oz)   Height: 5' 10" (1.778 m)     Body mass index is 30.73 kg/m².  Physical Exam  Vitals and nursing note reviewed.   Constitutional:       Appearance: Normal appearance.   Cardiovascular:      Rate and Rhythm: Normal rate.      Pulses: Normal pulses.      Heart sounds: Normal heart sounds.   Pulmonary:      Effort: Pulmonary effort is normal.      Breath sounds: Normal breath sounds.   Musculoskeletal:         General: Normal range of motion.   Neurological:      Mental Status: He is alert and oriented to person, place, and time.   Psychiatric:         Mood and Affect: Mood normal.         Behavior: Behavior normal.             Diagnoses and health risks identified today and associated recommendations/orders:    1. Encounter for preventive health examination  Pt was seen today for an Annual Wellness visit. Healthcare maintenance and screening recommendations were discussed and updated as indicated. Return in one year for AWV.    Review current opioid prescriptions: n/a  Screen for potential Substance Use Disorders: n/a    2. Atherosclerosis of aorta  The current medical regimen is effective;  continue present plan and medications.    3. Dyslipidemia  The current medical regimen is " effective;  continue present plan and medications.    4. Nodule of upper lobe of right lung 2 mm incidental on CT 2/2020; nonsmoker. no further testing.  The current medical regimen is effective;  continue present plan and medications.    5. History of colonic polyps 07/09/2021 colonoscopy sigmoid, descending diverticulosis.  Internal hemorrhoids.  2 mm sigmoid polyp.  Path not included  The current medical regimen is effective;  continue present plan and medications.        Provided Vincluis e with a 5-10 year written screening schedule and personal prevention plan. Recommendations were developed using the USPSTF age appropriate recommendations. Education, counseling, and referrals were provided as needed. After Visit Summary printed and given to patient which includes a list of additional screenings\tests needed.    Follow up in about 23 weeks (around 6/19/2023) with provider.    PAZ Baca  I offered to discuss advanced care planning, including how to pick a person who would make decisions for you if you were unable to make them for yourself, called a health care power of , and what kind of decisions you might make such as use of life sustaining treatments such as ventilators and tube feeding when faced with a life limiting illness recorded on a living will that they will need to know. (How you want to be cared for as you near the end of your natural life)     X Patient is interested in learning more about how to make advanced directives.  I provided them paperwork and offered to discuss this with them.

## 2023-06-02 ENCOUNTER — OFFICE VISIT (OUTPATIENT)
Dept: CARDIOLOGY | Facility: CLINIC | Age: 73
End: 2023-06-02
Payer: MEDICARE

## 2023-06-02 VITALS
OXYGEN SATURATION: 99 % | BODY MASS INDEX: 30.36 KG/M2 | WEIGHT: 212.06 LBS | HEIGHT: 70 IN | HEART RATE: 100 BPM | SYSTOLIC BLOOD PRESSURE: 118 MMHG | RESPIRATION RATE: 18 BRPM | DIASTOLIC BLOOD PRESSURE: 70 MMHG

## 2023-06-02 DIAGNOSIS — E78.5 DYSLIPIDEMIA: ICD-10-CM

## 2023-06-02 DIAGNOSIS — R00.2 PALPITATIONS: Primary | ICD-10-CM

## 2023-06-02 DIAGNOSIS — I70.0 ATHEROSCLEROSIS OF AORTA: ICD-10-CM

## 2023-06-02 PROCEDURE — 3008F PR BODY MASS INDEX (BMI) DOCUMENTED: ICD-10-PCS | Mod: CPTII,S$GLB,, | Performed by: INTERNAL MEDICINE

## 2023-06-02 PROCEDURE — 3078F DIAST BP <80 MM HG: CPT | Mod: CPTII,S$GLB,, | Performed by: INTERNAL MEDICINE

## 2023-06-02 PROCEDURE — 99999 PR PBB SHADOW E&M-EST. PATIENT-LVL III: CPT | Mod: PBBFAC,,, | Performed by: INTERNAL MEDICINE

## 2023-06-02 PROCEDURE — 99999 PR PBB SHADOW E&M-EST. PATIENT-LVL III: ICD-10-PCS | Mod: PBBFAC,,, | Performed by: INTERNAL MEDICINE

## 2023-06-02 PROCEDURE — 99214 OFFICE O/P EST MOD 30 MIN: CPT | Mod: S$GLB,,, | Performed by: INTERNAL MEDICINE

## 2023-06-02 PROCEDURE — 1159F PR MEDICATION LIST DOCUMENTED IN MEDICAL RECORD: ICD-10-PCS | Mod: CPTII,S$GLB,, | Performed by: INTERNAL MEDICINE

## 2023-06-02 PROCEDURE — 3288F FALL RISK ASSESSMENT DOCD: CPT | Mod: CPTII,S$GLB,, | Performed by: INTERNAL MEDICINE

## 2023-06-02 PROCEDURE — 1101F PT FALLS ASSESS-DOCD LE1/YR: CPT | Mod: CPTII,S$GLB,, | Performed by: INTERNAL MEDICINE

## 2023-06-02 PROCEDURE — 1101F PR PT FALLS ASSESS DOC 0-1 FALLS W/OUT INJ PAST YR: ICD-10-PCS | Mod: CPTII,S$GLB,, | Performed by: INTERNAL MEDICINE

## 2023-06-02 PROCEDURE — 3074F SYST BP LT 130 MM HG: CPT | Mod: CPTII,S$GLB,, | Performed by: INTERNAL MEDICINE

## 2023-06-02 PROCEDURE — 1126F PR PAIN SEVERITY QUANTIFIED, NO PAIN PRESENT: ICD-10-PCS | Mod: CPTII,S$GLB,, | Performed by: INTERNAL MEDICINE

## 2023-06-02 PROCEDURE — 99214 PR OFFICE/OUTPT VISIT, EST, LEVL IV, 30-39 MIN: ICD-10-PCS | Mod: S$GLB,,, | Performed by: INTERNAL MEDICINE

## 2023-06-02 PROCEDURE — 3008F BODY MASS INDEX DOCD: CPT | Mod: CPTII,S$GLB,, | Performed by: INTERNAL MEDICINE

## 2023-06-02 PROCEDURE — 1159F MED LIST DOCD IN RCRD: CPT | Mod: CPTII,S$GLB,, | Performed by: INTERNAL MEDICINE

## 2023-06-02 PROCEDURE — 93000 EKG 12-LEAD: ICD-10-PCS | Mod: S$GLB,,, | Performed by: INTERNAL MEDICINE

## 2023-06-02 PROCEDURE — 3078F PR MOST RECENT DIASTOLIC BLOOD PRESSURE < 80 MM HG: ICD-10-PCS | Mod: CPTII,S$GLB,, | Performed by: INTERNAL MEDICINE

## 2023-06-02 PROCEDURE — 3288F PR FALLS RISK ASSESSMENT DOCUMENTED: ICD-10-PCS | Mod: CPTII,S$GLB,, | Performed by: INTERNAL MEDICINE

## 2023-06-02 PROCEDURE — 3074F PR MOST RECENT SYSTOLIC BLOOD PRESSURE < 130 MM HG: ICD-10-PCS | Mod: CPTII,S$GLB,, | Performed by: INTERNAL MEDICINE

## 2023-06-02 PROCEDURE — 1126F AMNT PAIN NOTED NONE PRSNT: CPT | Mod: CPTII,S$GLB,, | Performed by: INTERNAL MEDICINE

## 2023-06-02 PROCEDURE — 93000 ELECTROCARDIOGRAM COMPLETE: CPT | Mod: S$GLB,,, | Performed by: INTERNAL MEDICINE

## 2023-06-02 NOTE — PROGRESS NOTES
Subjective:   Patient ID:  Rod Victor is a 72 y.o. male who presents for follow-up of No chief complaint on file.      HPI    Referred by Dr Buckley  ER 5/14 for LE edema.  Troponin negative. BNP WNL.   EKG WNL. CXR no acute process.  CBC, CMP WNL. UA normal     He thinks that the edema was due to salt intake.  Negative workup as above.     Having some left-sided low back pain.  It is a cramping sensation.  After standing for periods of time.  He puts cold packs on it and it does seem to help.  Lays on a heat pad in the mornings as well.  He had previously complained about this.  Felt to be musculoskeletal but the workup for this to rule out intra-abdominal pathology showed incidental hydronephrosis for which he has undergone workup with Urology.  At last urology visit, plan was to round out the workup with a CT urogram.  And then coronavirus came and so he never got that done.     He does not do any sort specific stretches for his back in general.     Went for colonoscopy and was told he had arrhythmia.  Was at .  [facility].  It is unclear what the arrhythmia was.  He was told he needed clearance in regards to the arrhythmia.  Denies symptomatic palpitations, jitteriness, lightheadedness, chest pain, shortness of breath.      EKG 5/14/21 NSR - ok     Echo 6/3/21  The left ventricle is normal in size with concentric hypertrophy and normal systolic function.  The estimated ejection fraction is 55%.  Normal left ventricular diastolic function.  Normal right ventricular size with normal right ventricular systolic function.  Mild left atrial enlargement.  Intermediate central venous pressure (8 mmHg).  The estimated PA systolic pressure is 35 mmHg.     Holter 6/3/21  Sinus rhythm with heart rates varying between 56 and 138 bpm with an average of 85 bpm  There were very rare PVC  Four beat run of VT.  There were very frequent PACs (6826)  Short runs of atrial tachycardia. Longest 13 beats.     5/19/21 Denies  prior cardiac Hx. Mild GARCIA. Denies CP  BP controlled  EKG NSR frequent PACs and blocked PACs    Echo and holter for frequent PACs and GARCIA     6/10/21 Denies CP or SOB. Occasional palpitations    Referred by Dr Buckley   Add toprol XL 25 qd for frequent PACs  Cleared for colonoscopy  OV 6 months     12/15/21 Denies CP or SOB. BP/HR mildly elevated  EKG NSR NSSTT changes  Increase toprol XL for BP and palpitations  Continue Rx for HTN, atherosclerosis, HTN  OV 6 months     6/22/22 Denies CP, mild GARCIA. Denies palpitations  EKG NSR NSSTT changes   Continue Rx for HTN, atherosclerosis, HLD, palpitations  OV 1 year    6/2/23 Denies CP or SOB. Exercises regularly  EKG NSR LVH  BP controlled    Review of Systems   Constitutional: Negative for decreased appetite.   HENT:  Negative for ear discharge.    Eyes:  Negative for blurred vision.   Endocrine: Negative for polyphagia.   Skin:  Negative for nail changes.   Genitourinary:  Negative for bladder incontinence.   Neurological:  Negative for aphonia.   Psychiatric/Behavioral:  Negative for hallucinations.    Allergic/Immunologic: Negative for hives.     Objective:   Physical Exam  Constitutional:       Appearance: He is well-developed.   HENT:      Head: Normocephalic and atraumatic.   Eyes:      Conjunctiva/sclera: Conjunctivae normal.      Pupils: Pupils are equal, round, and reactive to light.   Cardiovascular:      Rate and Rhythm: Normal rate.      Pulses: Intact distal pulses.      Heart sounds: Normal heart sounds.   Pulmonary:      Effort: Pulmonary effort is normal.      Breath sounds: Normal breath sounds.   Abdominal:      General: Bowel sounds are normal.      Palpations: Abdomen is soft.   Musculoskeletal:         General: Normal range of motion.      Cervical back: Normal range of motion and neck supple.   Skin:     General: Skin is warm and dry.   Neurological:      Mental Status: He is alert and oriented to person, place, and time.       Assessment:      1.  Palpitations, holter PACs; toprol    2. Dyslipidemia    3. Atherosclerosis of aorta        Plan:      Continue Rx for HTN, atherosclerosis, HLD, palpitations  OV 1 year

## 2023-06-07 DIAGNOSIS — I70.0 ATHEROSCLEROSIS OF AORTA: ICD-10-CM

## 2023-06-07 RX ORDER — ATORVASTATIN CALCIUM 20 MG/1
TABLET, FILM COATED ORAL
Qty: 90 TABLET | Refills: 0 | Status: SHIPPED | OUTPATIENT
Start: 2023-06-07 | End: 2023-06-19 | Stop reason: SDUPTHER

## 2023-06-08 NOTE — TELEPHONE ENCOUNTER
Refill Decision Note   [facility]uther[facility]  is requesting a refill authorization.  Brief Assessment and Rationale for Refill:  Approve     Medication Therapy Plan:         Comments:     Note composed:7:44 PM 06/07/2023

## 2023-06-08 NOTE — TELEPHONE ENCOUNTER
No care due was identified.  [facility] Embedded Care Due Messages. Reference number: [id number].   6/07/2023 7:39:56 PM CDT

## 2023-06-18 NOTE — PROGRESS NOTES
This note was created by combination of typed  and M-Modal dictation.  Transcription errors may be present.  If there are any questions, please contact me.    Assessment and Plan:   Assessment and Plan   Normal physical exam  Screening for prostate cancer  History of colonic polyps 07/09/2021 colonoscopy sigmoid, descending diverticulosis.  Internal hemorrhoids.  2 mm sigmoid polyp.  Path not included  Class 1 obesity due to excess calories without serious comorbidity with body mass index (BMI) of 30.0 to 30.9 in adult  Abnormal finding of blood chemistry  -had PEx labs done in December so too early for PSA  Will hold on labs today and sync up labs and visit for next year  Will have aged out of PSA testing by next year, will check PSA last time and if good no further  PSA has been < 1 x several readings  Exercising regularly  Diet overall ok  -     Comprehensive Metabolic Panel; Future; Expected date: 06/19/2024  -     Lipid Panel; Future; Expected date: 06/19/2024  -     CBC Without Differential; Future; Expected date: 06/19/2024  -     PSA, Screening; Future; Expected date: 06/19/2024  -     Uric Acid; Future; Expected date: 06/19/2024  -     Hemoglobin A1C; Future; Expected date: 06/19/2024    Dyslipidemia  Atherosclerosis of aorta  -last labs good on statin, no changes, repeat next year.   -     Lipid Panel; Future; Expected date: 06/19/2024  -     atorvastatin (LIPITOR) 20 MG tablet; Take 1 tablet (20 mg total) by mouth once daily.  Dispense: 90 tablet; Refill: 3    Podagra  -Had episode of what sounds like podagra in his right foot around [place] time in the setting of eating a lot of crab meat.  Has not had happened since.  Does not drink alcohol.  Does eat shrimp but has not had it occur with shrimp.    Check uric acid with next labs but low purine diet recommended.  If this occurs again I have asked him to return for evaluation.  -     CBC Without Differential; Future; Expected date:  06/19/2024  -     Uric Acid; Future; Expected date: 06/19/2024      Medications Discontinued During This Encounter   Medication Reason    atorvastatin (LIPITOR) 20 MG tablet Reorder       meds sent this encounter:     Medications Ordered This Encounter   Medications    atorvastatin (LIPITOR) 20 MG tablet     Sig: Take 1 tablet (20 mg total) by mouth once daily.     Dispense:  90 tablet     Refill:  3     Pharmacy update refills, keep on file, not requesting Rx to be filled today.        Follow Up: PEx 1 year  Future Appointments   Date Time Provider Department Center   6/19/2023  3:40 PM Luis Miguel Buckley MD [facility]          Subjective:   Subjective   Chief Complaint   Patient presents with    Annual Exam       HPI  Rod is a 72 y.o. male.     Social History     Tobacco Use    Smoking status: Never    Smokeless tobacco: Never   Substance Use Topics    Alcohol use: Not Currently     Comment: socially          Social History     Social History Narrative    Not on file       Last appointment with this clinic was 1/9/2023. Last visit with me 6/17/2022   To summarize last visit and events leading up to today:  Incidental aortic atherosclerosis.  Started on atorvastatin   Palpitations with evaluation showing PACs.  On metoprolol.  Followed by Cardiology.  Most recently seen 06/02/2023, stable, follow-up 1 year  06/03/2021 TTE LV normal size with concentric hypertrophy and normal systolic function LVEF 55%.  Normal diastolic function.  Normal RV size and systolic function.  Intermediate CVP 8. PA pressure 35.   06/03/2021 Holter 4 beat run of VT.  PACs.  Started on Toprol XL for the PACs  07/09/2021 colonoscopy sigmoid, descending diverticulosis.  Internal hemorrhoids.  2 mm sigmoid polyp.  Path not included.    Pre visit labs ordered   PSA due in December    Today's visit:  Please note: Chronic medical problems that are stable but are being addressed at this visit may not be listed/documented here, but may  "be addressed in more detail in the Assessment and Plan section.   Below are salient features of chronic medical conditions, or new/acute conditions and their details.    Around [place] had pain in his foot.  Ate a lot of crabmeat around the great toe MTP area.  No previous dx of gout.  No recurrence since then but not eating crab  Does not eat crawfish  Does eat shrimp.  2-3 times a month. No recurrence with this.    Does not drink alcohol.      Cheerios and banana weekday mornings  Sausage weekends  Glass of cranberry juice as well or OJ    Exercise - has home gym equipment.     Taking his medications as prescribed.  Denies obvious side effects of medication      Patient Care Team:  Luis Miguel Buckley MD as PCP - General (Internal Medicine)  Angela Triplett MA as Care Coordinator  Preston Mercer MD (Inactive) as Consulting Physician (Gastroenterology)    Patient Active Problem List    Diagnosis Date Noted    Dyslipidemia 12/21/2022    Palpitations, holter PACs; toprol 05/19/2021 06/03/2021 TTE LV normal size with concentric hypertrophy and normal systolic function LVEF 55%.  Normal diastolic function.  Normal RV size and systolic function.  Intermediate CVP 8. PA pressure 35.   06/03/2021 Holter 4 beat run of VT.  PACs.  Started on Toprol XL for the PACs      Atherosclerosis of aorta 10/20/2020     " Atherosclerotic calcification is noted of the aorta and its branches" - CT Abdomen Pelvis Without Contrast 2-7-2020      Nodule of upper lobe of right lung 2 mm incidental on CT 2/2020; nonsmoker. no further testing. 02/10/2020     2/2020 CT abd/pelvis: 2. 2 mm pulmonary nodule within the right upper lobe versus atelectasis.  For a solid nodule <6 mm, Fleischner Society 2017 guidelines recommend no routine follow up for a low risk patient, or follow-up with non-contrast chest CT at 12 months in a high risk patient.      Renal parapelvic cyst giving the appearance of hydronephrosis, not truly " "hydronephrosis.  no further testing/monitoring 02/10/2020     1/29/2020 renal US: Mild left hydronephrosis for which clinical and historical correlation is needed. Bilateral ureteral jets are noted.  2/7/2020 CT abd/pelvis without: 1. Moderate left hydronephrosis without hydroureter.  The appearance is nonspecific however may reflect that of chronic UPJ obstruction.  No findings to suggest obstructive uropathy or nephrolithiasis.   6/4/21 CT urogram: No evidence of hydronephrosis.  Appearance on previous CT and ultrasound was related to parapelvic cysts evident on current exam.  Colonic diverticula without evidence of diverticulitis  Small fat containing umbilical hernia.      History of colonic polyps 07/09/2021 colonoscopy sigmoid, descending diverticulosis.  Internal hemorrhoids.  2 mm sigmoid polyp.  Path not included 10/24/2018     adenoma 2013  07/09/2021 colonoscopy sigmoid, descending diverticulosis.  Internal hemorrhoids.  2 mm sigmoid polyp.  Path not included      Strabismus 10/24/2018    Rotator cuff tendonitis, left 10/24/2018    Left shoulder pain 09/13/2017       PAST MEDICAL PROBLEMS, PAST SURGICAL HISTORY: please see relevant portions of the electronic medical record    ALLERGIES AND MEDICATIONS: updated and reviewed.  Medication List with Changes/Refills   Current Medications    ATORVASTATIN (LIPITOR) 20 MG TABLET    Take 1 tablet by mouth once daily    METOPROLOL SUCCINATE (TOPROL-XL) 50 MG 24 HR TABLET    Take 1 tablet by mouth once daily         Objective:   Objective   Physical Exam   Vitals:    06/19/23 1521   BP: 130/82   Pulse: 81   Temp: 98.1 °F (36.7 °C)   TempSrc: Oral   SpO2: (!) 94%   Weight: 95.9 kg (211 lb 8.5 oz)   Height: 5' 10" (1.778 m)    Body mass index is 30.35 kg/m².  Weight: 95.9 kg (211 lb 8.5 oz)   Height: 5' 10" (177.8 cm)     Physical Exam  Constitutional:       Appearance: Normal appearance. He is well-developed.   HENT:      Right Ear: Tympanic membrane and external ear " normal.      Left Ear: Tympanic membrane and external ear normal.      Nose: Nose normal.   Eyes:      General: No scleral icterus.     Conjunctiva/sclera: Conjunctivae normal.      Comments: Left eye strabismus   Neck:      Thyroid: No thyroid mass or thyromegaly.      Trachea: Trachea normal.   Cardiovascular:      Rate and Rhythm: Normal rate and regular rhythm.      Heart sounds: Normal heart sounds, S1 normal and S2 normal. No murmur heard.  Pulmonary:      Effort: Pulmonary effort is normal.      Breath sounds: Normal breath sounds.   Abdominal:      General: There is no distension.      Palpations: Abdomen is soft. There is no hepatomegaly, splenomegaly or mass.      Tenderness: There is no abdominal tenderness.   Musculoskeletal:         General: No deformity.      Right lower leg: No edema.      Left lower leg: No edema.   Lymphadenopathy:      Cervical: No cervical adenopathy.   Skin:     General: Skin is warm and dry.      Findings: No rash (on exposed skin).      Comments: On exposed skin   Neurological:      Mental Status: He is alert and oriented to person, place, and time.      Cranial Nerves: No cranial nerve deficit.      Sensory: No sensory deficit.      Deep Tendon Reflexes: Reflexes are normal and symmetric.   Psychiatric:         Speech: Speech normal.         Behavior: Behavior normal.         Thought Content: Thought content normal.         Judgment: Judgment normal.

## 2023-06-19 ENCOUNTER — OFFICE VISIT (OUTPATIENT)
Dept: FAMILY MEDICINE | Facility: CLINIC | Age: 73
End: 2023-06-19
Payer: MEDICARE

## 2023-06-19 VITALS
OXYGEN SATURATION: 94 % | WEIGHT: 211.56 LBS | BODY MASS INDEX: 30.29 KG/M2 | HEIGHT: 70 IN | DIASTOLIC BLOOD PRESSURE: 82 MMHG | HEART RATE: 81 BPM | TEMPERATURE: 98 F | SYSTOLIC BLOOD PRESSURE: 130 MMHG

## 2023-06-19 DIAGNOSIS — I70.0 ATHEROSCLEROSIS OF AORTA: ICD-10-CM

## 2023-06-19 DIAGNOSIS — E66.09 CLASS 1 OBESITY DUE TO EXCESS CALORIES WITHOUT SERIOUS COMORBIDITY WITH BODY MASS INDEX (BMI) OF 30.0 TO 30.9 IN ADULT: ICD-10-CM

## 2023-06-19 DIAGNOSIS — M10.9 PODAGRA: ICD-10-CM

## 2023-06-19 DIAGNOSIS — Z86.010 HISTORY OF COLONIC POLYPS: ICD-10-CM

## 2023-06-19 DIAGNOSIS — E78.5 DYSLIPIDEMIA: ICD-10-CM

## 2023-06-19 DIAGNOSIS — Z00.00 NORMAL PHYSICAL EXAM: Primary | ICD-10-CM

## 2023-06-19 DIAGNOSIS — R79.9 ABNORMAL FINDING OF BLOOD CHEMISTRY: ICD-10-CM

## 2023-06-19 DIAGNOSIS — Z12.5 SCREENING FOR PROSTATE CANCER: ICD-10-CM

## 2023-06-19 PROCEDURE — 1160F RVW MEDS BY RX/DR IN RCRD: CPT | Mod: CPTII,S$GLB,, | Performed by: INTERNAL MEDICINE

## 2023-06-19 PROCEDURE — 3075F PR MOST RECENT SYSTOLIC BLOOD PRESS GE 130-139MM HG: ICD-10-PCS | Mod: CPTII,S$GLB,, | Performed by: INTERNAL MEDICINE

## 2023-06-19 PROCEDURE — 3288F FALL RISK ASSESSMENT DOCD: CPT | Mod: CPTII,S$GLB,, | Performed by: INTERNAL MEDICINE

## 2023-06-19 PROCEDURE — 99397 PR PREVENTIVE VISIT,EST,65 & OVER: ICD-10-PCS | Mod: GZ,S$GLB,, | Performed by: INTERNAL MEDICINE

## 2023-06-19 PROCEDURE — 1160F PR REVIEW ALL MEDS BY PRESCRIBER/CLIN PHARMACIST DOCUMENTED: ICD-10-PCS | Mod: CPTII,S$GLB,, | Performed by: INTERNAL MEDICINE

## 2023-06-19 PROCEDURE — 1159F MED LIST DOCD IN RCRD: CPT | Mod: CPTII,S$GLB,, | Performed by: INTERNAL MEDICINE

## 2023-06-19 PROCEDURE — 99999 PR PBB SHADOW E&M-EST. PATIENT-LVL III: CPT | Mod: PBBFAC,,, | Performed by: INTERNAL MEDICINE

## 2023-06-19 PROCEDURE — 3079F PR MOST RECENT DIASTOLIC BLOOD PRESSURE 80-89 MM HG: ICD-10-PCS | Mod: CPTII,S$GLB,, | Performed by: INTERNAL MEDICINE

## 2023-06-19 PROCEDURE — 3079F DIAST BP 80-89 MM HG: CPT | Mod: CPTII,S$GLB,, | Performed by: INTERNAL MEDICINE

## 2023-06-19 PROCEDURE — 3288F PR FALLS RISK ASSESSMENT DOCUMENTED: ICD-10-PCS | Mod: CPTII,S$GLB,, | Performed by: INTERNAL MEDICINE

## 2023-06-19 PROCEDURE — 99397 PER PM REEVAL EST PAT 65+ YR: CPT | Mod: GZ,S$GLB,, | Performed by: INTERNAL MEDICINE

## 2023-06-19 PROCEDURE — 99999 PR PBB SHADOW E&M-EST. PATIENT-LVL III: ICD-10-PCS | Mod: PBBFAC,,, | Performed by: INTERNAL MEDICINE

## 2023-06-19 PROCEDURE — 1101F PR PT FALLS ASSESS DOC 0-1 FALLS W/OUT INJ PAST YR: ICD-10-PCS | Mod: CPTII,S$GLB,, | Performed by: INTERNAL MEDICINE

## 2023-06-19 PROCEDURE — 3008F PR BODY MASS INDEX (BMI) DOCUMENTED: ICD-10-PCS | Mod: CPTII,S$GLB,, | Performed by: INTERNAL MEDICINE

## 2023-06-19 PROCEDURE — 1159F PR MEDICATION LIST DOCUMENTED IN MEDICAL RECORD: ICD-10-PCS | Mod: CPTII,S$GLB,, | Performed by: INTERNAL MEDICINE

## 2023-06-19 PROCEDURE — 3008F BODY MASS INDEX DOCD: CPT | Mod: CPTII,S$GLB,, | Performed by: INTERNAL MEDICINE

## 2023-06-19 PROCEDURE — 3075F SYST BP GE 130 - 139MM HG: CPT | Mod: CPTII,S$GLB,, | Performed by: INTERNAL MEDICINE

## 2023-06-19 PROCEDURE — 1126F AMNT PAIN NOTED NONE PRSNT: CPT | Mod: CPTII,S$GLB,, | Performed by: INTERNAL MEDICINE

## 2023-06-19 PROCEDURE — 1101F PT FALLS ASSESS-DOCD LE1/YR: CPT | Mod: CPTII,S$GLB,, | Performed by: INTERNAL MEDICINE

## 2023-06-19 PROCEDURE — 1126F PR PAIN SEVERITY QUANTIFIED, NO PAIN PRESENT: ICD-10-PCS | Mod: CPTII,S$GLB,, | Performed by: INTERNAL MEDICINE

## 2023-06-19 RX ORDER — ATORVASTATIN CALCIUM 20 MG/1
20 TABLET, FILM COATED ORAL DAILY
Qty: 90 TABLET | Refills: 3 | Status: SHIPPED | OUTPATIENT
Start: 2023-06-19

## 2023-08-25 ENCOUNTER — OFFICE VISIT (OUTPATIENT)
Dept: PODIATRY | Facility: CLINIC | Age: 73
End: 2023-08-25
Payer: MEDICARE

## 2023-08-25 VITALS — HEIGHT: 70 IN | WEIGHT: 211.44 LBS | BODY MASS INDEX: 30.27 KG/M2

## 2023-08-25 DIAGNOSIS — L60.0 INGROWN NAIL: Primary | ICD-10-CM

## 2023-08-25 DIAGNOSIS — L03.039 PARONYCHIA OF TOE, UNSPECIFIED LATERALITY: ICD-10-CM

## 2023-08-25 DIAGNOSIS — M79.675 TOE PAIN, LEFT: ICD-10-CM

## 2023-08-25 PROCEDURE — 1101F PR PT FALLS ASSESS DOC 0-1 FALLS W/OUT INJ PAST YR: ICD-10-PCS | Mod: CPTII,S$GLB,, | Performed by: PODIATRIST

## 2023-08-25 PROCEDURE — 3288F FALL RISK ASSESSMENT DOCD: CPT | Mod: CPTII,S$GLB,, | Performed by: PODIATRIST

## 2023-08-25 PROCEDURE — 99204 OFFICE O/P NEW MOD 45 MIN: CPT | Mod: S$GLB,,, | Performed by: PODIATRIST

## 2023-08-25 PROCEDURE — 3008F PR BODY MASS INDEX (BMI) DOCUMENTED: ICD-10-PCS | Mod: CPTII,S$GLB,, | Performed by: PODIATRIST

## 2023-08-25 PROCEDURE — 1159F PR MEDICATION LIST DOCUMENTED IN MEDICAL RECORD: ICD-10-PCS | Mod: CPTII,S$GLB,, | Performed by: PODIATRIST

## 2023-08-25 PROCEDURE — 1101F PT FALLS ASSESS-DOCD LE1/YR: CPT | Mod: CPTII,S$GLB,, | Performed by: PODIATRIST

## 2023-08-25 PROCEDURE — 1159F MED LIST DOCD IN RCRD: CPT | Mod: CPTII,S$GLB,, | Performed by: PODIATRIST

## 2023-08-25 PROCEDURE — 1125F AMNT PAIN NOTED PAIN PRSNT: CPT | Mod: CPTII,S$GLB,, | Performed by: PODIATRIST

## 2023-08-25 PROCEDURE — 99999 PR PBB SHADOW E&M-EST. PATIENT-LVL II: ICD-10-PCS | Mod: PBBFAC,,, | Performed by: PODIATRIST

## 2023-08-25 PROCEDURE — 99999 PR PBB SHADOW E&M-EST. PATIENT-LVL II: CPT | Mod: PBBFAC,,, | Performed by: PODIATRIST

## 2023-08-25 PROCEDURE — 99204 PR OFFICE/OUTPT VISIT, NEW, LEVL IV, 45-59 MIN: ICD-10-PCS | Mod: S$GLB,,, | Performed by: PODIATRIST

## 2023-08-25 PROCEDURE — 3288F PR FALLS RISK ASSESSMENT DOCUMENTED: ICD-10-PCS | Mod: CPTII,S$GLB,, | Performed by: PODIATRIST

## 2023-08-25 PROCEDURE — 1125F PR PAIN SEVERITY QUANTIFIED, PAIN PRESENT: ICD-10-PCS | Mod: CPTII,S$GLB,, | Performed by: PODIATRIST

## 2023-08-25 PROCEDURE — 3008F BODY MASS INDEX DOCD: CPT | Mod: CPTII,S$GLB,, | Performed by: PODIATRIST

## 2023-08-25 RX ORDER — TOBRAMYCIN 3 MG/ML
SOLUTION/ DROPS OPHTHALMIC
Qty: 5 ML | Refills: 3 | Status: SHIPPED | OUTPATIENT
Start: 2023-08-25 | End: 2023-10-26

## 2023-08-25 NOTE — PROGRESS NOTES
Subjective:      Patient ID: Rod Victor is a 72 y.o. male.    Chief Complaint: Ingrown Toenail (Left great toenail)    Sharp throbbing pain left big toe/nail.  Gradual onset, worsening over past several weeks, aggravated by increased weight bearing, shoe gear, pressure.  No previous medical treatment.  OTC pain med not helping. Charlie trauma, surfgery.    Review of Systems   Constitutional: Negative for chills, diaphoresis, fever, malaise/fatigue and night sweats.   Cardiovascular:  Negative for claudication, cyanosis, leg swelling and syncope.   Skin:  Positive for nail changes. Negative for color change, dry skin, rash, suspicious lesions and unusual hair distribution.   Musculoskeletal:  Negative for falls, joint pain, joint swelling, muscle cramps, muscle weakness and stiffness.   Gastrointestinal:  Negative for constipation, diarrhea, nausea and vomiting.   Neurological:  Negative for brief paralysis, disturbances in coordination, focal weakness, numbness, paresthesias, sensory change and tremors.         Objective:      Physical Exam  Constitutional:       General: He is not in acute distress.     Appearance: He is well-developed. He is not diaphoretic.   Cardiovascular:      Pulses:           Popliteal pulses are 2+ on the right side and 2+ on the left side.        Dorsalis pedis pulses are 2+ on the right side and 2+ on the left side.        Posterior tibial pulses are 2+ on the right side and 2+ on the left side.      Comments: Capillary refill 3 seconds all toes/distal feet, all toes/both feet warm to touch.      Negative lymphadenopathy bilateral popliteal fossa and tarsal tunnel.      Negavie lower extremity edema bilateral.    Musculoskeletal:      Right ankle: No swelling, deformity, ecchymosis or lacerations. Normal range of motion. Normal pulse.      Right Achilles Tendon: Normal. No defects. Lowery's test negative.   Lymphadenopathy:      Lower Body: No right inguinal adenopathy. No left  inguinal adenopathy.      Comments: Negative lymphadenopathy bilateral popliteal fossa and tarsal tunnel.    Negative lymphangitic streaking bilateral feet/ankles/legs.   Skin:     General: Skin is warm and dry.      Capillary Refill: Capillary refill takes 2 to 3 seconds.      Coloration: Skin is not pale.      Findings: No abrasion, bruising, burn, ecchymosis, erythema, laceration, lesion or rash.      Nails: There is no clubbing.      Comments: Visible and palpable ingrowth of toenail lateral border left hallux with pain to palpation, and focal localized erythema and edema,  without ulceration, drainage, pus, tracking, fluctuance, malodor, or cardinal signs infection.       Neurological:      Mental Status: He is alert and oriented to person, place, and time.      Sensory: No sensory deficit.      Motor: No tremor, atrophy or abnormal muscle tone.      Gait: Gait normal.      Deep Tendon Reflexes:      Reflex Scores:       Patellar reflexes are 2+ on the right side and 2+ on the left side.       Achilles reflexes are 2+ on the right side and 2+ on the left side.  Psychiatric:         Behavior: Behavior is cooperative.           Assessment:       Encounter Diagnoses   Name Primary?    Ingrown nail Yes    Toe pain, left     Paronychia of toe, unspecified laterality          Plan:       Rod was seen today for ingrown toenail.    Diagnoses and all orders for this visit:    Ingrown nail    Toe pain, left    Paronychia of toe, unspecified laterality    Other orders  -     tobramycin sulfate 0.3% (TOBREX) 0.3 % ophthalmic solution; 1-2 drops topically twice daily to affected toe(s).      I counseled the patient on his conditions, their implications and medical management.    Topical tobramycin drops bid left halulx.    Cover left hallux all times with band aid/similar changing daily.    Discussed conservative treatment with shoes of adequate dimensions, material, and style to alleviate symptoms and delay or  prevent surgical intervention.      Utilizing sterile toenail clippers I aggressively debrided the offending nail border approximately 3 mm from its edge and carried the nail plate incision down at an angle in order to wedge out the offending cryptotic portion of the nail plate. The offending border was then removed in toto. The area was cleansed with alcohol. Patient tolerated the procedure well and related significant relief.          Follow up in about 1 week (around 9/1/2023).

## 2023-10-26 ENCOUNTER — HOSPITAL ENCOUNTER (OUTPATIENT)
Facility: HOSPITAL | Age: 73
Discharge: HOME | End: 2023-10-27
Attending: EMERGENCY MEDICINE | Admitting: HOSPITALIST
Payer: MEDICARE

## 2023-10-26 DIAGNOSIS — G45.9 TIA (TRANSIENT ISCHEMIC ATTACK): ICD-10-CM

## 2023-10-26 DIAGNOSIS — E78.5 DYSLIPIDEMIA: ICD-10-CM

## 2023-10-26 DIAGNOSIS — I70.0 ATHEROSCLEROSIS OF AORTA: ICD-10-CM

## 2023-10-26 DIAGNOSIS — H53.8 BLURRY VISION: ICD-10-CM

## 2023-10-26 DIAGNOSIS — R07.9 CHEST PAIN: ICD-10-CM

## 2023-10-26 DIAGNOSIS — I10 HYPERTENSION: Primary | ICD-10-CM

## 2023-10-26 LAB
ALBUMIN SERPL BCP-MCNC: 3.9 G/DL (ref 3.5–5.2)
ALP SERPL-CCNC: 100 U/L (ref 55–135)
ALT SERPL W/O P-5'-P-CCNC: 16 U/L (ref 10–44)
ANION GAP SERPL CALC-SCNC: 11 MMOL/L (ref 8–16)
AORTIC ROOT ANNULUS: 3.51 CM
AORTIC VALVE CUSP SEPERATION: 2.03 CM
APTT PPP: 26.5 SEC (ref 21–32)
ASCENDING AORTA: 3.34 CM
AST SERPL-CCNC: 21 U/L (ref 10–40)
AV INDEX (PROSTH): 0.95
AV MEAN GRADIENT: 3 MMHG
AV PEAK GRADIENT: 5 MMHG
AV VALVE AREA BY VELOCITY RATIO: 3.99 CM²
AV VALVE AREA: 4.15 CM²
AV VELOCITY RATIO: 0.91
BASOPHILS # BLD AUTO: 0.04 K/UL (ref 0–0.2)
BASOPHILS NFR BLD: 0.4 % (ref 0–1.9)
BILIRUB SERPL-MCNC: 0.3 MG/DL (ref 0.1–1)
BILIRUB UR QL STRIP: NEGATIVE
BSA FOR ECHO PROCEDURE: 2.16 M2
BUN SERPL-MCNC: 14 MG/DL (ref 8–23)
CALCIUM SERPL-MCNC: 9.1 MG/DL (ref 8.7–10.5)
CHLORIDE SERPL-SCNC: 106 MMOL/L (ref 95–110)
CHOLEST SERPL-MCNC: 114 MG/DL (ref 120–199)
CHOLEST/HDLC SERPL: 2.5 {RATIO} (ref 2–5)
CLARITY UR: CLEAR
CO2 SERPL-SCNC: 23 MMOL/L (ref 23–29)
COLOR UR: COLORLESS
CREAT SERPL-MCNC: 1 MG/DL (ref 0.5–1.4)
CV ECHO LV RWT: 0.4 CM
DIFFERENTIAL METHOD: ABNORMAL
DOP CALC AO PEAK VEL: 1.15 M/S
DOP CALC AO VTI: 25.7 CM
DOP CALC LVOT AREA: 4.4 CM2
DOP CALC LVOT DIAMETER: 2.36 CM
DOP CALC LVOT PEAK VEL: 1.05 M/S
DOP CALC LVOT STROKE VOLUME: 106.68 CM3
DOP CALCLVOT PEAK VEL VTI: 24.4 CM
E WAVE DECELERATION TIME: 156.93 MSEC
E/A RATIO: 1.11
E/E' RATIO: 9.47 M/S
ECHO LV POSTERIOR WALL: 0.97 CM (ref 0.6–1.1)
EOSINOPHIL # BLD AUTO: 0.4 K/UL (ref 0–0.5)
EOSINOPHIL NFR BLD: 3.6 % (ref 0–8)
ERYTHROCYTE [DISTWIDTH] IN BLOOD BY AUTOMATED COUNT: 12.5 % (ref 11.5–14.5)
EST. GFR  (NO RACE VARIABLE): >60 ML/MIN/1.73 M^2
ESTIMATED AVG GLUCOSE: 105 MG/DL (ref 68–131)
FRACTIONAL SHORTENING: 29 % (ref 28–44)
GLUCOSE SERPL-MCNC: 98 MG/DL (ref 70–110)
GLUCOSE UR QL STRIP: NEGATIVE
HBA1C MFR BLD: 5.3 % (ref 4–5.6)
HCT VFR BLD AUTO: 41.9 % (ref 40–54)
HDLC SERPL-MCNC: 45 MG/DL (ref 40–75)
HDLC SERPL: 39.5 % (ref 20–50)
HGB BLD-MCNC: 14.4 G/DL (ref 14–18)
HGB UR QL STRIP: NEGATIVE
IMM GRANULOCYTES # BLD AUTO: 0.03 K/UL (ref 0–0.04)
IMM GRANULOCYTES NFR BLD AUTO: 0.3 % (ref 0–0.5)
INR PPP: 1.1 (ref 0.8–1.2)
INTERVENTRICULAR SEPTUM: 1.07 CM (ref 0.6–1.1)
IVC DIAMETER: 0.94 CM
IVRT: 114.18 MSEC
KETONES UR QL STRIP: NEGATIVE
LA MAJOR: 5.04 CM
LA MINOR: 5.1 CM
LA WIDTH: 4.4 CM
LDLC SERPL CALC-MCNC: 63 MG/DL (ref 63–159)
LEFT ATRIUM SIZE: 3.84 CM
LEFT ATRIUM VOLUME INDEX: 34.2 ML/M2
LEFT ATRIUM VOLUME: 72.81 CM3
LEFT INTERNAL DIMENSION IN SYSTOLE: 3.44 CM (ref 2.1–4)
LEFT VENTRICLE DIASTOLIC VOLUME INDEX: 51.52 ML/M2
LEFT VENTRICLE DIASTOLIC VOLUME: 109.74 ML
LEFT VENTRICLE MASS INDEX: 83 G/M2
LEFT VENTRICLE SYSTOLIC VOLUME INDEX: 23 ML/M2
LEFT VENTRICLE SYSTOLIC VOLUME: 48.93 ML
LEFT VENTRICULAR INTERNAL DIMENSION IN DIASTOLE: 4.84 CM (ref 3.5–6)
LEFT VENTRICULAR MASS: 177.22 G
LEUKOCYTE ESTERASE UR QL STRIP: NEGATIVE
LV LATERAL E/E' RATIO: 7.5 M/S
LV SEPTAL E/E' RATIO: 12.86 M/S
LVOT MG: 2.13 MMHG
LVOT MV: 0.68 CM/S
LYMPHOCYTES # BLD AUTO: 1.9 K/UL (ref 1–4.8)
LYMPHOCYTES NFR BLD: 19.2 % (ref 18–48)
MCH RBC QN AUTO: 33 PG (ref 27–31)
MCHC RBC AUTO-ENTMCNC: 34.4 G/DL (ref 32–36)
MCV RBC AUTO: 96 FL (ref 82–98)
MONOCYTES # BLD AUTO: 1 K/UL (ref 0.3–1)
MONOCYTES NFR BLD: 10.5 % (ref 4–15)
MV PEAK A VEL: 0.81 M/S
MV PEAK E VEL: 0.9 M/S
MV STENOSIS PRESSURE HALF TIME: 45.51 MS
MV VALVE AREA P 1/2 METHOD: 4.83 CM2
NEUTROPHILS # BLD AUTO: 6.4 K/UL (ref 1.8–7.7)
NEUTROPHILS NFR BLD: 66 % (ref 38–73)
NITRITE UR QL STRIP: NEGATIVE
NONHDLC SERPL-MCNC: 69 MG/DL
NRBC BLD-RTO: 0 /100 WBC
PH UR STRIP: 7 [PH] (ref 5–8)
PISA TR MAX VEL: 2.98 M/S
PLATELET # BLD AUTO: 183 K/UL (ref 150–450)
PMV BLD AUTO: 11.1 FL (ref 9.2–12.9)
POTASSIUM SERPL-SCNC: 4.5 MMOL/L (ref 3.5–5.1)
PROT SERPL-MCNC: 7.8 G/DL (ref 6–8.4)
PROT UR QL STRIP: NEGATIVE
PROTHROMBIN TIME: 11.2 SEC (ref 9–12.5)
PULM VEIN S/D RATIO: 0.94
PV PEAK D VEL: 0.67 M/S
PV PEAK GRADIENT: 2 MMHG
PV PEAK S VEL: 0.63 M/S
PV PEAK VELOCITY: 0.67 M/S
RA MAJOR: 3.94 CM
RA PRESSURE ESTIMATED: 3 MMHG
RA WIDTH: 3.1 CM
RBC # BLD AUTO: 4.37 M/UL (ref 4.6–6.2)
RIGHT VENTRICULAR END-DIASTOLIC DIMENSION: 3.34 CM
RV TB RVSP: 6 MMHG
RV TISSUE DOPPLER FREE WALL SYSTOLIC VELOCITY 1 (APICAL 4 CHAMBER VIEW): 13.8 CM/S
SINUS: 3.4 CM
SODIUM SERPL-SCNC: 140 MMOL/L (ref 136–145)
SP GR UR STRIP: 1 (ref 1–1.03)
STJ: 2.72 CM
TDI LATERAL: 0.12 M/S
TDI SEPTAL: 0.07 M/S
TDI: 0.1 M/S
TR MAX PG: 36 MMHG
TRIGL SERPL-MCNC: 30 MG/DL (ref 30–150)
TROPONIN I SERPL DL<=0.01 NG/ML-MCNC: 0.01 NG/ML (ref 0–0.03)
TROPONIN I SERPL DL<=0.01 NG/ML-MCNC: 0.01 NG/ML (ref 0–0.03)
TSH SERPL DL<=0.005 MIU/L-ACNC: 2.04 UIU/ML (ref 0.4–4)
TV PEAK GRADIENT: 1 MMHG
TV REST PULMONARY ARTERY PRESSURE: 39 MMHG
URN SPEC COLLECT METH UR: ABNORMAL
UROBILINOGEN UR STRIP-ACNC: NEGATIVE EU/DL
WBC # BLD AUTO: 9.74 K/UL (ref 3.9–12.7)
Z-SCORE OF LEFT VENTRICULAR DIMENSION IN END DIASTOLE: -3.38
Z-SCORE OF LEFT VENTRICULAR DIMENSION IN END SYSTOLE: -1.47

## 2023-10-26 PROCEDURE — 81003 URINALYSIS AUTO W/O SCOPE: CPT | Performed by: NURSE PRACTITIONER

## 2023-10-26 PROCEDURE — 84484 ASSAY OF TROPONIN QUANT: CPT | Performed by: EMERGENCY MEDICINE

## 2023-10-26 PROCEDURE — 25000003 PHARM REV CODE 250: Performed by: EMERGENCY MEDICINE

## 2023-10-26 PROCEDURE — 99285 EMERGENCY DEPT VISIT HI MDM: CPT | Mod: 25

## 2023-10-26 PROCEDURE — 85610 PROTHROMBIN TIME: CPT | Performed by: NURSE PRACTITIONER

## 2023-10-26 PROCEDURE — 83036 HEMOGLOBIN GLYCOSYLATED A1C: CPT | Performed by: NURSE PRACTITIONER

## 2023-10-26 PROCEDURE — 84443 ASSAY THYROID STIM HORMONE: CPT | Performed by: NURSE PRACTITIONER

## 2023-10-26 PROCEDURE — G0378 HOSPITAL OBSERVATION PER HR: HCPCS

## 2023-10-26 PROCEDURE — 99215 OFFICE O/P EST HI 40 MIN: CPT | Mod: ,,, | Performed by: INTERNAL MEDICINE

## 2023-10-26 PROCEDURE — 25000003 PHARM REV CODE 250: Performed by: NURSE PRACTITIONER

## 2023-10-26 PROCEDURE — 93010 ELECTROCARDIOGRAM REPORT: CPT | Mod: ,,, | Performed by: INTERNAL MEDICINE

## 2023-10-26 PROCEDURE — 99215 PR OFFICE/OUTPT VISIT, EST, LEVL V, 40-54 MIN: ICD-10-PCS | Mod: ,,, | Performed by: INTERNAL MEDICINE

## 2023-10-26 PROCEDURE — 80053 COMPREHEN METABOLIC PANEL: CPT | Performed by: EMERGENCY MEDICINE

## 2023-10-26 PROCEDURE — 25500020 PHARM REV CODE 255: Performed by: STUDENT IN AN ORGANIZED HEALTH CARE EDUCATION/TRAINING PROGRAM

## 2023-10-26 PROCEDURE — 93010 EKG 12-LEAD: ICD-10-PCS | Mod: ,,, | Performed by: INTERNAL MEDICINE

## 2023-10-26 PROCEDURE — 80061 LIPID PANEL: CPT | Performed by: NURSE PRACTITIONER

## 2023-10-26 PROCEDURE — 85025 COMPLETE CBC W/AUTO DIFF WBC: CPT | Performed by: EMERGENCY MEDICINE

## 2023-10-26 PROCEDURE — 84484 ASSAY OF TROPONIN QUANT: CPT | Mod: 91 | Performed by: NURSE PRACTITIONER

## 2023-10-26 PROCEDURE — 25000003 PHARM REV CODE 250: Performed by: STUDENT IN AN ORGANIZED HEALTH CARE EDUCATION/TRAINING PROGRAM

## 2023-10-26 PROCEDURE — 93005 ELECTROCARDIOGRAM TRACING: CPT

## 2023-10-26 PROCEDURE — 85730 THROMBOPLASTIN TIME PARTIAL: CPT | Performed by: NURSE PRACTITIONER

## 2023-10-26 RX ORDER — ASPIRIN 325 MG
325 TABLET ORAL
Status: COMPLETED | OUTPATIENT
Start: 2023-10-26 | End: 2023-10-26

## 2023-10-26 RX ORDER — SODIUM CHLORIDE 0.9 % (FLUSH) 0.9 %
10 SYRINGE (ML) INJECTION
Status: DISCONTINUED | OUTPATIENT
Start: 2023-10-26 | End: 2023-10-27 | Stop reason: HOSPADM

## 2023-10-26 RX ORDER — ASPIRIN 81 MG/1
81 TABLET ORAL DAILY
Qty: 30 TABLET | Refills: 1 | Status: SHIPPED | OUTPATIENT
Start: 2023-10-27 | End: 2023-12-22 | Stop reason: SDUPTHER

## 2023-10-26 RX ORDER — AMLODIPINE BESYLATE 5 MG/1
10 TABLET ORAL DAILY
Status: DISCONTINUED | OUTPATIENT
Start: 2023-10-26 | End: 2023-10-27 | Stop reason: HOSPADM

## 2023-10-26 RX ORDER — METOPROLOL SUCCINATE 50 MG/1
50 TABLET, EXTENDED RELEASE ORAL DAILY
Status: DISCONTINUED | OUTPATIENT
Start: 2023-10-26 | End: 2023-10-27 | Stop reason: HOSPADM

## 2023-10-26 RX ORDER — ENOXAPARIN SODIUM 100 MG/ML
40 INJECTION SUBCUTANEOUS EVERY 24 HOURS
Status: DISCONTINUED | OUTPATIENT
Start: 2023-10-26 | End: 2023-10-27 | Stop reason: HOSPADM

## 2023-10-26 RX ORDER — HYDRALAZINE HYDROCHLORIDE 20 MG/ML
10 INJECTION INTRAMUSCULAR; INTRAVENOUS EVERY 6 HOURS PRN
Status: DISCONTINUED | OUTPATIENT
Start: 2023-10-26 | End: 2023-10-26

## 2023-10-26 RX ORDER — ASPIRIN 81 MG/1
81 TABLET ORAL DAILY
Status: DISCONTINUED | OUTPATIENT
Start: 2023-10-27 | End: 2023-10-27 | Stop reason: HOSPADM

## 2023-10-26 RX ORDER — METOPROLOL SUCCINATE 50 MG/1
50 TABLET, EXTENDED RELEASE ORAL DAILY
Status: DISCONTINUED | OUTPATIENT
Start: 2023-10-26 | End: 2023-10-26

## 2023-10-26 RX ORDER — ACETAMINOPHEN 325 MG/1
650 TABLET ORAL EVERY 6 HOURS PRN
Status: DISCONTINUED | OUTPATIENT
Start: 2023-10-26 | End: 2023-10-27 | Stop reason: HOSPADM

## 2023-10-26 RX ORDER — HYDRALAZINE HYDROCHLORIDE 25 MG/1
25 TABLET, FILM COATED ORAL EVERY 8 HOURS PRN
Status: DISCONTINUED | OUTPATIENT
Start: 2023-10-26 | End: 2023-10-27 | Stop reason: HOSPADM

## 2023-10-26 RX ORDER — ATORVASTATIN CALCIUM 10 MG/1
20 TABLET, FILM COATED ORAL NIGHTLY
Status: DISCONTINUED | OUTPATIENT
Start: 2023-10-26 | End: 2023-10-27 | Stop reason: HOSPADM

## 2023-10-26 RX ORDER — ONDANSETRON 2 MG/ML
4 INJECTION INTRAMUSCULAR; INTRAVENOUS EVERY 6 HOURS PRN
Status: DISCONTINUED | OUTPATIENT
Start: 2023-10-26 | End: 2023-10-27 | Stop reason: HOSPADM

## 2023-10-26 RX ADMIN — METOPROLOL SUCCINATE 50 MG: 50 TABLET, EXTENDED RELEASE ORAL at 03:10

## 2023-10-26 RX ADMIN — IOHEXOL 75 ML: 350 INJECTION, SOLUTION INTRAVENOUS at 02:10

## 2023-10-26 RX ADMIN — ASPIRIN 325 MG ORAL TABLET 325 MG: 325 PILL ORAL at 03:10

## 2023-10-26 RX ADMIN — AMLODIPINE BESYLATE 10 MG: 5 TABLET ORAL at 06:10

## 2023-10-26 RX ADMIN — ATORVASTATIN CALCIUM 20 MG: 10 TABLET, FILM COATED ORAL at 08:10

## 2023-10-26 NOTE — PLAN OF CARE
Problem: Adult Inpatient Plan of Care  Goal: Plan of Care Review  Outcome: Adequate for Care Transition  Goal: Patient-Specific Goal (Individualized)  Outcome: Adequate for Care Transition  Goal: Absence of Hospital-Acquired Illness or Injury  Outcome: Adequate for Care Transition  Goal: Optimal Comfort and Wellbeing  Outcome: Adequate for Care Transition  Goal: Readiness for Transition of Care  Outcome: Adequate for Care Transition     Problem: Infection  Goal: Absence of Infection Signs and Symptoms  Outcome: Adequate for Care Transition     Problem: Adjustment to Illness (Stroke, Ischemic/Transient Ischemic Attack)  Goal: Optimal Coping  Outcome: Adequate for Care Transition     Problem: Bowel Elimination Impaired (Stroke, Ischemic/Transient Ischemic Attack)  Goal: Effective Bowel Elimination  Outcome: Adequate for Care Transition     Problem: Cerebral Tissue Perfusion (Stroke, Ischemic/Transient Ischemic Attack)  Goal: Optimal Cerebral Tissue Perfusion  Outcome: Adequate for Care Transition     Problem: Cognitive Impairment (Stroke, Ischemic/Transient Ischemic Attack)  Goal: Optimal Cognitive Function  Outcome: Adequate for Care Transition     Problem: Communication Impairment (Stroke, Ischemic/Transient Ischemic Attack)  Goal: Improved Communication Skills  Outcome: Adequate for Care Transition     Problem: Functional Ability Impaired (Stroke, Ischemic/Transient Ischemic Attack)  Goal: Optimal Functional Ability  Outcome: Adequate for Care Transition     Problem: Respiratory Compromise (Stroke, Ischemic/Transient Ischemic Attack)  Goal: Effective Oxygenation and Ventilation  Outcome: Adequate for Care Transition     Problem: Sensorimotor Impairment (Stroke, Ischemic/Transient Ischemic Attack)  Goal: Improved Sensorimotor Function  Outcome: Adequate for Care Transition     Problem: Swallowing Impairment (Stroke, Ischemic/Transient Ischemic Attack)  Goal: Optimal Eating and Swallowing without  Aspiration  Outcome: Adequate for Care Transition     Problem: Urinary Elimination Impaired (Stroke, Ischemic/Transient Ischemic Attack)  Goal: Effective Urinary Elimination  Outcome: Adequate for Care Transition

## 2023-10-26 NOTE — H&P
"[facility] Medicine  History & Physical    Patient Name: Rod Victor  MRN: [id number]  Patient Class: OP- Observation  Admission Date: 10/26/2023  Attending Physician: Gloria Woodson MD   Primary Care Provider: Luis Miguel Buckley MD         Patient information was obtained from patient and ER records.     Subjective:     Principal Problem:TIA (transient ischemic attack)    Chief Complaint:   Chief Complaint   Patient presents with    Hypertension     Pt came to ED c/o HTN. States he started taking his pressure at home and noticed he was running high. Pt denies having any visual disturbances, slurred speech, facial droop or any other neurological deficits. Pt is VAN negative. Pt denies recent trauma or illness. Pt takes metoprolol for HR control.         HPI: 73 year old male with past medical history of hypertension, dyslipidemia, palpitations, L strabismus present to the ED for  of hypertension. He reports earlier today, he ate a poboy and some pork meatballs. He then started experiencing blurred vision bilaterally that has currently resolved. Also notes a mild "tension like" R sided headache. This prompted him to check his BP which was incidentally high. Denies chest pain, dyspnea, abdominal pain, nausea, vomiting, dizziness, or other associated symptoms.                      Past Medical History:   Diagnosis Date    Disorder of kidney and ureter     History of colonic polyps 10/24/2018    adenoma 2013, polyp 2016 at [facility] - 5 yrs    Rotator cuff tendonitis, left 10/24/2018    Strabismus 10/24/2018       Past Surgical History:   Procedure Laterality Date    CYSTOSCOPY W/ RETROGRADES Bilateral 3/11/2020    Procedure: CYSTOSCOPY, WITH RETROGRADE PYELOGRAM;  Surgeon: ANABELL Vásquez MD;  Location: [facility];  Service: Urology;  Laterality: Bilateral;  RN PREOP 3/4/2020    NO PAST SURGERIES         Review of patient's allergies indicates:  No Known Allergies    No current " facility-administered medications on file prior to encounter.     Current Outpatient Medications on File Prior to Encounter   Medication Sig    atorvastatin (LIPITOR) 20 MG tablet Take 1 tablet (20 mg total) by mouth once daily.    metoprolol succinate (TOPROL-XL) 50 MG 24 hr tablet Take 1 tablet by mouth once daily    sars-cov-2, covid-19 omicron, (MODERNA COVID BIVAL,6M UP,,PF,) 50 mcg/0.5 mL injection Inject into the muscle.    tobramycin sulfate 0.3% (TOBREX) 0.3 % ophthalmic solution 1-2 drops topically twice daily to affected toe(s).     Family History    None       Tobacco Use    Smoking status: Never    Smokeless tobacco: Never   Substance and Sexual Activity    Alcohol use: Not Currently     Comment: socially    Drug use: No    Sexual activity: Yes     Partners: Female     Review of Systems   Eyes:  Positive for visual disturbance.   Neurological:  Positive for headaches.   All other systems reviewed and are negative.    Objective:     Vital Signs (Most Recent):  Temp: 97.9 °F (36.6 °C) (10/26/23 0127)  Pulse: 60 (10/26/23 0303)  Resp: 18 (10/26/23 0127)  BP: (!) 172/93 (10/26/23 0303)  SpO2: 99 % (10/26/23 0303) Vital Signs (24h Range):  Temp:  [97.9 °F (36.6 °C)] 97.9 °F (36.6 °C)  Pulse:  [60-66] 60  Resp:  [18] 18  SpO2:  [98 %-100 %] 99 %  BP: (172-193)/(93) 172/93     Weight: 93 kg (205 lb)  Body mass index is 28.59 kg/m².     Physical Exam  Vitals reviewed.   Constitutional:       Appearance: Normal appearance.   HENT:      Head: Normocephalic and atraumatic.      Mouth/Throat:      Mouth: Mucous membranes are dry.   Eyes:      Comments: EOMI on R  L eye: unable to move past midline, chronic   Pupils 2 mm.     Cardiovascular:      Rate and Rhythm: Normal rate and regular rhythm.   Pulmonary:      Effort: Pulmonary effort is normal.      Breath sounds: Normal breath sounds.   Abdominal:      General: Bowel sounds are normal.      Palpations: Abdomen is soft.   Musculoskeletal:         General: Normal  range of motion.   Skin:     General: Skin is warm and dry.   Neurological:      Mental Status: He is alert and oriented to person, place, and time. Mental status is at baseline.      Comments: No facial asymmetry. No drift. F2N intact   Psychiatric:         Mood and Affect: Mood normal.                Significant Labs: All pertinent labs within the past 24 hours have been reviewed.    Significant Imaging: I have reviewed all pertinent imaging results/findings within the past 24 hours.    Assessment/Plan:     * TIA (transient ischemic attack)  -PTA blurry vision (resolved)   -given Asa in the ED   -CT head:  1. No CT evidence of acute intracranial abnormality.  If there is clinical concern for acute ischemia, further assessment with MRI is advised if there are no clinical contraindications.2. Findings suggestive of chronic microvascular ischemic change. 3. Frontal and ethmoid sinus disease.  -Consult neuro  -Echo in am  -MRI brain   -PT/OT  -Tele monitoring  -Neuro checks  -EKG as needed   -Permissive HTN  -Hydralazine as needed         Dyslipidemia  -On statin (resume)   -lipid panel pending     Palpitations, holter PACs; toprol  -PTA metoprolol (hold)   -tele monitoring  -EKG as needed       Strabismus  -Note L eye       VTE Risk Mitigation (From admission, onward)           Ordered     enoxaparin injection 40 mg  Daily         10/26/23 0400     IP VTE HIGH RISK PATIENT  Once         10/26/23 0400     Place sequential compression device  Until discontinued         10/26/23 0400                         On 10/26/2023, patient should be placed in hospital observation services under my care in collaboration with Dr. Mary Lou NP  Department of Hospital Medicine  [facility] - Emergency Dept

## 2023-10-26 NOTE — SUBJECTIVE & OBJECTIVE
Past Medical History:   Diagnosis Date    Disorder of kidney and ureter     History of colonic polyps 10/24/2018    adenoma 2013, polyp 2016 at [facility] - 5 yrs    Rotator cuff tendonitis, left 10/24/2018    Strabismus 10/24/2018       Past Surgical History:   Procedure Laterality Date    CYSTOSCOPY W/ RETROGRADES Bilateral 3/11/2020    Procedure: CYSTOSCOPY, WITH RETROGRADE PYELOGRAM;  Surgeon: ANABELL Vásquez MD;  Location: [facility];  Service: Urology;  Laterality: Bilateral;  RN PREOP 3/4/2020    NO PAST SURGERIES         Review of patient's allergies indicates:  No Known Allergies    No current facility-administered medications on file prior to encounter.     Current Outpatient Medications on File Prior to Encounter   Medication Sig    atorvastatin (LIPITOR) 20 MG tablet Take 1 tablet (20 mg total) by mouth once daily.    metoprolol succinate (TOPROL-XL) 50 MG 24 hr tablet Take 1 tablet by mouth once daily    sars-cov-2, covid-19 omicron, (MODERNA COVID BIVAL,6M UP,,PF,) 50 mcg/0.5 mL injection Inject into the muscle.    tobramycin sulfate 0.3% (TOBREX) 0.3 % ophthalmic solution 1-2 drops topically twice daily to affected toe(s).     Family History    None       Tobacco Use    Smoking status: Never    Smokeless tobacco: Never   Substance and Sexual Activity    Alcohol use: Not Currently     Comment: socially    Drug use: No    Sexual activity: Yes     Partners: Female     Review of Systems   Eyes:  Positive for visual disturbance.   Neurological:  Positive for headaches.   All other systems reviewed and are negative.    Objective:     Vital Signs (Most Recent):  Temp: 97.9 °F (36.6 °C) (10/26/23 0127)  Pulse: 60 (10/26/23 0303)  Resp: 18 (10/26/23 0127)  BP: (!) 172/93 (10/26/23 0303)  SpO2: 99 % (10/26/23 0303) Vital Signs (24h Range):  Temp:  [97.9 °F (36.6 °C)] 97.9 °F (36.6 °C)  Pulse:  [60-66] 60  Resp:  [18] 18  SpO2:  [98 %-100 %] 99 %  BP: (172-193)/(93) 172/93     Weight: 93 kg (205 lb)  Body mass index  is 28.59 kg/m².     Physical Exam  Vitals reviewed.   Constitutional:       Appearance: Normal appearance.   HENT:      Head: Normocephalic and atraumatic.      Mouth/Throat:      Mouth: Mucous membranes are dry.   Eyes:      Comments: EOMI on R  L eye: unable to move past midline, chronic   Pupils 2 mm.     Cardiovascular:      Rate and Rhythm: Normal rate and regular rhythm.   Pulmonary:      Effort: Pulmonary effort is normal.      Breath sounds: Normal breath sounds.   Abdominal:      General: Bowel sounds are normal.      Palpations: Abdomen is soft.   Musculoskeletal:         General: Normal range of motion.   Skin:     General: Skin is warm and dry.   Neurological:      Mental Status: He is alert and oriented to person, place, and time. Mental status is at baseline.      Comments: No facial asymmetry. No drift. F2N intact   Psychiatric:         Mood and Affect: Mood normal.                Significant Labs: All pertinent labs within the past 24 hours have been reviewed.    Significant Imaging: I have reviewed all pertinent imaging results/findings within the past 24 hours.

## 2023-10-26 NOTE — CARE UPDATE
73 year old male with past medical history of hypertension, dyslipidemia, palpitations, L strabismus admitted to observation on 10/26/2023 for further evaluation of HTN urgency and blurred vision. He has been compliant with his medicatoins. Admit he ate a poboy for his birthday which may have been high in sodium. Labs unremarkable. CT head with no CT evidence of acute intracranial abnormality. Findings suggestive of chronic microvascular ischemic change. MRI brain w/No evidence of acute intracranial pathology.  Neurology consulted-recommends CTA head and neck. Okay for normotension.     Neurology consulted, appreciate input. Echo pending. CTA head and neck ordered.  If CTA head and neck negative, then okay for discharge per neurology. Will continue to monitor. Resume home metoprolol. Monitor BP    I reviewed the patient's medications, past medical/surgical history and the H&P note. I agree with the physical exam and assessment and plan.

## 2023-10-26 NOTE — ASSESSMENT & PLAN NOTE
-PTA blurry vision (resolved)   -given Asa in the ED   -CT head:  1. No CT evidence of acute intracranial abnormality.  If there is clinical concern for acute ischemia, further assessment with MRI is advised if there are no clinical contraindications.2. Findings suggestive of chronic microvascular ischemic change. 3. Frontal and ethmoid sinus disease.  -Consult neuro  -Echo in am  -MRI brain   -PT/OT  -Tele monitoring  -Neuro checks  -EKG as needed   -Permissive HTN  -Hydralazine as needed

## 2023-10-26 NOTE — ED NOTES
Pt states that he ate a hamburger that he thinks may have had pork in it. He then felt funny after taking his medications and had a throbbing in his head. He placed an ice pack on his head and took his BP multiple times an it kept going up. Pt here for evaluation of high blood pressure    LOC: Pt is awake alert and aware of environment, oriented X3 and speaking appropriately  Appearance: Pt is in no acute distress, Pt is well groomed and clean  Skin: skin is warm and dry with normal turgor, mucus membranes are moist and pink, skin is intact with no bruising or breakdown  Muskuloskeletal: Pt moves all extremities well, there is no obvious swelling or deformities noted, pulses are intact.  Respiratory: Airway is open and patent, respirations are spontaneous and even.  Cardiac:  no edema and cap refill is <3sec  Abdomen: soft, non-tender and non-distended  Neuro: Pt follows commands easily and has no obvious deficits

## 2023-10-26 NOTE — ED PROVIDER NOTES
"Encounter Date: 10/26/2023    SCRIBE #1 NOTE: I, Araceli Bridgette, am scribing for, and in the presence of,  Jagdeep Nguyen MD.       History     Chief Complaint   Patient presents with    Hypertension     Pt came to ED c/o HTN. States he started taking his pressure at home and noticed he was running high. Pt denies having any visual disturbances, slurred speech, facial droop or any other neurological deficits. Pt is VAN negative. Pt denies recent trauma or illness. Pt takes metoprolol for HR control.      74 yo M presenting to the ED for concerns of hypertension. PMHx significant for HTN, L strabismus. He reports earlier today, he ate a poboy and some pork meatballs. He then started experiencing blurred vision bilaterally that has currently resolved. Also notes a mild "tension like" R sided headache. This prompted him to check his BP which was incidentally high. He endorses adherence with all his antihypertensives. No other exacerbating or alleviating factors. Denies chest pain, dyspnea, abdominal pain, nausea, vomiting, dizziness, or other associated symptoms. Patient denies EtOH, cigarette, or illicit drug use.     The history is provided by the patient.     Review of patient's allergies indicates:  No Known Allergies  Past Medical History:   Diagnosis Date    Disorder of kidney and ureter     History of colonic polyps 10/24/2018    adenoma 2013, polyp 2016 at [facility] - 5 yrs    Hypertension 10/26/2023    Rotator cuff tendonitis, left 10/24/2018    Strabismus 10/24/2018     Past Surgical History:   Procedure Laterality Date    CYSTOSCOPY W/ RETROGRADES Bilateral 3/11/2020    Procedure: CYSTOSCOPY, WITH RETROGRADE PYELOGRAM;  Surgeon: ANABELL Vásquez MD;  Location: [facility];  Service: Urology;  Laterality: Bilateral;  RN PREOP 3/4/2020    NO PAST SURGERIES       No family history on file.  Social History     Tobacco Use    Smoking status: Never    Smokeless tobacco: Never   Substance Use Topics    Alcohol use: Not " Currently     Comment: socially    Drug use: No     Review of Systems   Constitutional:  Negative for chills and fever.        +htn   HENT:  Negative for congestion, rhinorrhea and sore throat.    Eyes:  Positive for visual disturbance.   Respiratory:  Negative for cough and shortness of breath.    Cardiovascular:  Negative for chest pain.   Gastrointestinal:  Negative for abdominal pain, diarrhea, nausea and vomiting.   Genitourinary:  Negative for dysuria, frequency and hematuria.   Musculoskeletal:  Negative for back pain.   Skin:  Negative for rash.   Neurological:  Positive for headaches. Negative for dizziness, syncope, speech difficulty, weakness and numbness.       Physical Exam     Initial Vitals [10/26/23 0127]   BP Pulse Resp Temp SpO2   (!) 193/93 66 18 97.9 °F (36.6 °C) 98 %      MAP       --         Physical Exam    Nursing note and vitals reviewed.  Constitutional: He appears well-developed. He is not diaphoretic. No distress.   HENT:   Head: Normocephalic.   Eyes: Pupils are equal, round, and reactive to light.   EOMI on R  L eye: unable to move past midline, chronic   Pupils 2 mm.    Cardiovascular:  Normal rate and regular rhythm.           No murmur heard.  Pulmonary/Chest: Effort normal and breath sounds normal. He has no wheezes.   Abdominal: Abdomen is soft. He exhibits no distension. There is no abdominal tenderness.   Musculoskeletal:         General: Normal range of motion.     Neurological: He is alert.   No facial asymmetry. No drift. F2N intact.    Skin: Skin is warm.         ED Course   Procedures  Labs Reviewed   CBC W/ AUTO DIFFERENTIAL - Abnormal; Notable for the following components:       Result Value    RBC 4.37 (*)     MCH 33.0 (*)     All other components within normal limits   LIPID PANEL - Abnormal; Notable for the following components:    Cholesterol 114 (*)     All other components within normal limits    Narrative:     Fasting   COMPREHENSIVE METABOLIC PANEL   TROPONIN I    TROPONIN I   TSH    Narrative:     Fasting   URINALYSIS, REFLEX TO URINE CULTURE   HEMOGLOBIN A1C   TROPONIN I   APTT   PROTIME-INR          Imaging Results              CT Head Without Contrast (Final result)  Result time 10/26/23 02:46:34      Final result by Caitlin David MD (10/26/23 02:46:34)                   Impression:      1. No CT evidence of acute intracranial abnormality.  If there is clinical concern for acute ischemia, further assessment with MRI is advised if there are no clinical contraindications.  2. Findings suggestive of chronic microvascular ischemic change.  3. Frontal and ethmoid sinus disease.      Electronically signed by: Caitlin David MD  Date:    10/26/2023  Time:    02:46               Narrative:    EXAMINATION:  CT HEAD WITHOUT CONTRAST    CLINICAL HISTORY:  blurry vision;    TECHNIQUE:  Low dose axial images were obtained through the head.  Coronal and sagittal reformations were also performed. Contrast was not administered.    COMPARISON:  None.    FINDINGS:  There is no acute intracranial hemorrhage, hydrocephalus, midline shift or mass effect. There is mild generalized cerebral volume loss.  There is periventricular white matter hypoattenuation, nonspecific but likely reflecting sequela of chronic microvascular ischemic change.  Gray-white matter differentiation otherwise appears maintained.  The basal cisterns are patent. The mastoid air cells are clear.  There is mucosal thickening of the frontal sinuses and mucosal thickening and patchy opacification of the ethmoid air cells.  The visualized bones of the calvarium demonstrate no acute osseous abnormality.                                       Medications   atorvastatin tablet 20 mg (has no administration in time range)   sodium chloride 0.9% flush 10 mL (has no administration in time range)   enoxaparin injection 40 mg (has no administration in time range)   ondansetron injection 4 mg (has no administration in time range)    aspirin EC tablet 81 mg (has no administration in time range)   hydrALAZINE injection 10 mg (has no administration in time range)   acetaminophen tablet 650 mg (has no administration in time range)   aspirin tablet 325 mg (325 mg Oral Given 10/26/23 0333)     Medical Decision Making    73-year-old male presenting for concerns for elevated blood pressure.  Patient said he checking his blood pressure due to episode of blurry vision.  The episode lasted roughly 15 minutes and self resolved.  He denied any weakness or numbness in any extremities.  During the ER stay patient reports he is asymptomatic.  Patient was hypertensive on initial arrival however did not require any antihypertensive for improvement in blood pressure.  No focal neurological deficit at this time.  Given highly elevated blood pressures in setting of taking his previous antihypertensives and vision changes concern for possible CVA/TIA.  Patient does not appear to be having a hypertensive emergency at this time with no end-organ damage.  Case discussed with the observation service for possible MRI in the morning.      Differential asymptomatic hypertension, TIA    Amount and/or Complexity of Data Reviewed  Labs: ordered. Decision-making details documented in ED Course.  Radiology: ordered. Decision-making details documented in ED Course.  ECG/medicine tests:  Decision-making details documented in ED Course.    Risk  OTC drugs.            Scribe Attestation:   Scribe #1: I performed the above scribed service and the documentation accurately describes the services I performed. I attest to the accuracy of the note.        ED Course as of 10/26/23 0520   Thu Oct 26, 2023   0213 EKG 12-lead  Time 2:11 a.m.     Rate 61, sinus, regular rhythm, normal axis.   QRS 88 .  No ST elevation or depression.  T-wave inversion aVL.  No hyperacute T-waves.  No Q-waves present.    Normal sinus rhythm with first-degree AV block and nonspecific T-wave  inversion [BRENDA]   0342 Case discussed with [facility] medicine.  [JM]      ED Course User Index  [JM] Jagdeep Nguyen MD                  I, Jagdeep Nguyen, personally performed the services described in this documentation. All medical record entries made by the scribe were at my direction and in my presence. I have reviewed the chart and agree that the record reflects my personal performance and is accurate and complete.    Clinical Impression:   Final diagnoses:  [I10] Hypertension  [H53.8] Blurry vision        ED Disposition Condition    Observation                 Jagdeep Nguyen MD  10/26/23 8995

## 2023-10-26 NOTE — NURSING
Call doctor because b/p was 180/ 94. Instructed to obtain manual b/p. Manual 180/90. Orders to hold discharge. Give Norvasc 10 mg now and daily. Notified patient and wife of cancellation of discharge and orders

## 2023-10-26 NOTE — PT/OT/SLP PROGRESS
Occupational Therapy      Patient Name:  Rod Victor   MRN:  [id number]    Patient not seen today secondary to per nurse, pt is (I) w/ all ADLs and functional mobility; nurse observed pt ambulating to bathroom w/o concerns . No OT needs; OT to sign off.     10/26/2023

## 2023-10-26 NOTE — Clinical Note
Diagnosis: Blurry vision [041090]   Future Attending Provider: LIZ RODRIGES [[id number]]   Admitting Provider:: LIZ RODRIGES [[id number]]

## 2023-10-26 NOTE — HOSPITAL COURSE
73 year old male with past medical history of hypertension, dyslipidemia, palpitations, L strabismus admitted to observation on 10/26/2023 for further evaluation of HTN urgency and blurred vision. He has been compliant with his medicatoins. Admit he ate a poboy for his birthday which may have been high in sodium. Labs unremarkable. CT head with no CT evidence of acute intracranial abnormality. Findings suggestive of chronic microvascular ischemic change. MRI brain w/No evidence of acute intracranial pathology.  Neurology consulted-recommends CTA head and neck. Okay for normotension. CTA head and neck showed No large vessel occlusion in the intracranial circulation. . Echo showed estimated ejection fraction of 60 - 65%. Grade I diastolic dysfunction.Bubble study negative . okay for discharge per neurology. Resumed home metoprolol. Initiated pt on norvasc and PO hydralazine with improvement in his blood pressure. Pt denies any fever, headaches, vision changes, chest pain, shortness of breath, palpitations, abdominal pain, nausea, vomiting, or any new weaknesses. Feels ready to go home. Patient's exam on discharge was as follow: Patient is alert and oriented, appears in no acute distress, heart with regular rate and rhythm, lungs clear to asculation with non-labored breathing, abdomen soft, and no new weaknesses or focal deficits seen. Bilateral lower extremities without any edema or calf tenderness.     Patient was counseled regarding any abnormal labs, differential diagnosis, treatment options, risk-benefit, lifestyle changes, prognosis, current condition, and medications. Patient was interactive and attentive.  Patient's questions were answered in a respectful and timely manner. Patient was instructed to follow-up with PCP within 1 week and to continue taking medications as prescribed.  Instructed to also follow up with vascular neurology. Also, extensively discussed the risks, benefits, and side effects of  patient's medications. Discussed with patient about any medication changes. Patient verbalized understanding and agrees to treatment plan.  Patient is stable for discharge.  Patient has no other questions or concerns at this time.  ED precautions discussed with the patient.    Vital signs are stable. Ambulating without any difficulty. Tolerating p.o. intake without any nausea or vomiting. Afebrile for over 24 hours. Patient is in stable condition and has no questions or concerns. Patient will be discharge to home once transportation secured . Prescriptions sent to pharmacy.  CM/SW to assist with discharge planning.     Vitals:    10/27/23 0335 10/27/23 0507 10/27/23 0737 10/27/23 1118   BP: (!) 183/93 135/82 (!) 173/87 134/79   BP Location: Right arm  Right arm Right arm   Patient Position: Lying  Lying Lying   Pulse: 69 70 66 (!) 113   Resp: 17  18 18   Temp: 98.3 °F (36.8 °C)  97.7 °F (36.5 °C) 97.8 °F (36.6 °C)   TempSrc: Oral  Oral Oral   SpO2: 97%  96% 98%   Weight:       Height:

## 2023-10-26 NOTE — PT/OT/SLP PROGRESS
Physical Therapy      Patient Name:  Rod Victor   MRN:  [id number]    PT eval orders received.  Per nurse, pt is independent with functional mobility and ambulation. No skilled PT services needed at this time. PT orders to be discontinued.

## 2023-10-26 NOTE — DISCHARGE SUMMARY
"[facility] Medicine  Discharge Summary      Patient Name: Rod Victor  MRN: [id number]  [facility]: [id number]  Patient Class: OP- Observation  Admission Date: 10/26/2023  Hospital Length of Stay: 0 days  Discharge Date and Time:  10/27/2023 4:50 PM  Attending Physician: Sobeida Arauz DO   Discharging Provider: Sobeida Arauz DO  Primary Care Provider: Luis Miguel Buckley MD    Primary Care Team: Networked reference to record PCT     HPI:   73 year old male with past medical history of hypertension, dyslipidemia, palpitations, L strabismus present to the ED for  of hypertension. He reports earlier today, he ate a poboy and some pork meatballs. He then started experiencing blurred vision bilaterally that has currently resolved. Also notes a mild "tension like" R sided headache. This prompted him to check his BP which was incidentally high. Denies chest pain, dyspnea, abdominal pain, nausea, vomiting, dizziness, or other associated symptoms.                    * No surgery found *      Hospital Course:   73 year old male with past medical history of hypertension, dyslipidemia, palpitations, L strabismus admitted to observation on 10/26/2023 for further evaluation of HTN urgency and blurred vision. He has been compliant with his medicatoins. Admit he ate a poboy for his birthday which may have been high in sodium. Labs unremarkable. CT head with no CT evidence of acute intracranial abnormality. Findings suggestive of chronic microvascular ischemic change. MRI brain w/No evidence of acute intracranial pathology.  Neurology consulted-recommends CTA head and neck. Okay for normotension. CTA head and neck showed No large vessel occlusion in the intracranial circulation. . Echo showed estimated ejection fraction of 60 - 65%. Grade I diastolic dysfunction.Bubble study negative . okay for discharge per neurology. Resumed home metoprolol. Initiated pt on norvasc and PO hydralazine with improvement in his blood " pressure. Pt denies any fever, headaches, vision changes, chest pain, shortness of breath, palpitations, abdominal pain, nausea, vomiting, or any new weaknesses. Feels ready to go home. Patient's exam on discharge was as follow: Patient is alert and oriented, appears in no acute distress, heart with regular rate and rhythm, lungs clear to asculation with non-labored breathing, abdomen soft, and no new weaknesses or focal deficits seen. Bilateral lower extremities without any edema or calf tenderness.     Patient was counseled regarding any abnormal labs, differential diagnosis, treatment options, risk-benefit, lifestyle changes, prognosis, current condition, and medications. Patient was interactive and attentive.  Patient's questions were answered in a respectful and timely manner. Patient was instructed to follow-up with PCP within 1 week and to continue taking medications as prescribed.  Instructed to also follow up with vascular neurology. Also, extensively discussed the risks, benefits, and side effects of patient's medications. Discussed with patient about any medication changes. Patient verbalized understanding and agrees to treatment plan.  Patient is stable for discharge.  Patient has no other questions or concerns at this time.  ED precautions discussed with the patient.    Vital signs are stable. Ambulating without any difficulty. Tolerating p.o. intake without any nausea or vomiting. Afebrile for over 24 hours. Patient is in stable condition and has no questions or concerns. Patient will be discharge to home once transportation secured . Prescriptions sent to pharmacy.  CM/SW to assist with discharge planning.     Vitals:    10/27/23 0335 10/27/23 0507 10/27/23 0737 10/27/23 1118   BP: (!) 183/93 135/82 (!) 173/87 134/79   BP Location: Right arm  Right arm Right arm   Patient Position: Lying  Lying Lying   Pulse: 69 70 66 (!) 113   Resp: 17  18 18   Temp: 98.3 °F (36.8 °C)  97.7 °F (36.5 °C) 97.8 °F (36.6  °C)   TempSrc: Oral  Oral Oral   SpO2: 97%  96% 98%   Weight:       Height:                   Goals of Care Treatment Preferences:  Code Status: Full Code      Consults:   Consults (From admission, onward)          Status Ordering Provider     Inpatient consult to Neurology  Once        Provider:  Odalis Maldonado MD    Completed BEVELRY RAMIREZ     IP consult to case management/social work  Once        Provider:  (Not yet assigned)    JASMINA Albarran new Assessment & Plan notes have been filed under this hospital service since the last note was generated.  Service: Hospital Medicine    Final Active Diagnoses:    Diagnosis Date Noted POA    PRINCIPAL PROBLEM:  Hypertension [I10] 10/26/2023 Yes    Dyslipidemia [E78.5] 12/21/2022 Yes    Palpitations, holter PACs; toprol [R00.2] 05/19/2021 Yes    Strabismus [H50.9] 10/24/2018 Yes      Problems Resolved During this Admission:       Discharged Condition: stable    Disposition: Home or Self Care    Follow Up:    Patient Instructions:      Diet Cardiac     Diet Cardiac     Notify your health care provider if you experience any of the following:  difficulty breathing or increased cough     Notify your health care provider if you experience any of the following:  persistent dizziness, light-headedness, or visual disturbances     Notify your health care provider if you experience any of the following:  increased confusion or weakness     Notify your health care provider if you experience any of the following:  persistent nausea and vomiting or diarrhea     Notify your health care provider if you experience any of the following:  difficulty breathing or increased cough     Notify your health care provider if you experience any of the following:  severe persistent headache     Notify your health care provider if you experience any of the following:  increased confusion or weakness     Notify your health care provider if you experience any of the  following:  persistent dizziness, light-headedness, or visual disturbances     Activity as tolerated     Activity as tolerated       Significant Diagnostic Studies: Labs: All labs within the past 24 hours have been reviewed     Pt denies any fever, headaches, vision changes, chest pain, shortness of breath, palpitations, abdominal pain, nausea, vomiting, or any new weaknesses. Feels ready to go home. Patient's exam on discharge was as follow: Patient is alert and oriented, appears in no acute distress, heart with regular rate and rhythm, lungs clear to asculation with non-labored breathing, abdomen soft, and no new weaknesses or focal deficits seen. Bilateral lower extremities without any edema or calf tenderness.     Patient was counseled regarding any abnormal labs, differential diagnosis, treatment options, risk-benefit, lifestyle changes, prognosis, current condition, and medications. Patient was interactive and attentive.  Patient's questions were answered in a respectful and timely manner. Patient was instructed to follow-up with PCP within 1 week and to continue taking medications as prescribed. Also, extensively discussed the risks, benefits, and side effects of patient's medications. Discussed with patient about any medication changes. Patient verbalized understanding and agrees to treatment plan.  Patient is stable for discharge.  Patient has no other questions or concerns at this time.  ED precautions discussed with the patient.    Vital signs are stable. Ambulating without any difficulty. Tolerating p.o. intake without any nausea or vomiting. Afebrile for over 24 hours. Patient is in stable condition and has no questions or concerns. Patient will be discharge to home . Prescriptions sent to pharmacy.  CM/SW to assist with discharge planning.       Pending Diagnostic Studies:       None           Medications:  Reconciled Home Medications:      Medication List        START taking these medications       amLODIPine 10 MG tablet  Commonly known as: NORVASC  Take 1 tablet (10 mg total) by mouth once daily.  Start taking on: October 28, 2023     aspirin 81 MG EC tablet  Commonly known as: ECOTRIN  Take 1 tablet (81 mg total) by mouth once daily.     hydrALAZINE 25 MG tablet  Commonly known as: APRESOLINE  Take 1 tablet (25 mg total) by mouth every 8 (eight) hours.            CONTINUE taking these medications      atorvastatin 20 MG tablet  Commonly known as: LIPITOR  Take 1 tablet (20 mg total) by mouth once daily.     metoprolol succinate 50 MG 24 hr tablet  Commonly known as: TOPROL-XL  Take 1 tablet by mouth once daily              Indwelling Lines/Drains at time of discharge:   Lines/Drains/Airways       None                   Time spent on the discharge of patient: Greater than 35 minutes         Sobeida Arauz DO  Department of Hospital Medicine  [facility] [place] - Med Surg

## 2023-10-26 NOTE — CONSULTS
Please call pharmacy and cancel both prescriptions for both antivirals.  Patient has decided he does not want to take    [facility]  Neurology Consult Note    Patient Name: Rod Victor  Age: 73 y.o.  MRN: [id number]  Admission Date: 10/26/2023  Reason for consult:  TIA    CC:  Hypertension    HPI:   Rod Victor is a 73 y.o. year old male with past medical history of hypertension, left eye strabismus, hyperlipidemia presenting to the ER yesterday for sudden onset of blurry vision and dizziness.  History obtained from patient and chart review.    Patient states that last evening he would some pork meatball in his hand which after which he got home.  He had sudden onset of lightheadedness associated with blurry vision which caught him by surprise.  Symptoms lasted around 15-20 minutes.  They were not associated with any other cranial nerve abnormalities or weakness and numbness in the limbs.  Patient and wife took his blood pressure at that time and it consistently stayed in systolic 160-170s.  All the symptoms had resolved but due to consistent high blood pressure, he presented to the ER for further evaluation.    In the ED, vitals remarkable for systolic blood pressure 190s.  Patient was asymptomatic from Neurology standpoint except mild discomfort in his right temple that started with symptoms earlier this evening.  CT head was obtained which was unremarkable.  Patient was admitted for observation and further evaluation.  MRI brain was obtained which was unremarkable as well.  Neurology was consulted for concern for TIA versus hypertensive emergency.    Patient is independent at baseline.  He has left eye strabismus and can not cross midline which is chronic.  On daily statin, not on aspirin.  Does not usually check his blood pressure at home.  Nonsmoker.  Denies any similar symptoms in the past, amaurosis fugax.    Histories:  Allergies:  Patient has no known allergies.    Current Medications:    Current Facility-Administered Medications   Medication Dose Route Frequency Provider Last Rate Last Admin    acetaminophen  "tablet 650 mg  650 mg Oral Q6H PRN France Irby NP        [START ON 10/27/2023] aspirin EC tablet 81 mg  81 mg Oral Daily France Irby NP        atorvastatin tablet 20 mg  20 mg Oral QHS France Irby, NP        enoxaparin injection 40 mg  40 mg Subcutaneous Daily France Irby, PANDA        hydrALAZINE injection 10 mg  10 mg Intravenous Q6H PRN France Irby, PANDA        ondansetron injection 4 mg  4 mg Intravenous Q6H PRN France Irby, NP        sodium chloride 0.9% flush 10 mL  10 mL Intravenous PRN France Irby NP           Medical:   Past Medical History:   Diagnosis Date    Disorder of kidney and ureter     History of colonic polyps 10/24/2018    adenoma 2013, polyp 2016 at [facility] - 5 yrs    Hypertension 10/26/2023    Rotator cuff tendonitis, left 10/24/2018    Strabismus 10/24/2018        Surgeries:   Past Surgical History:   Procedure Laterality Date    CYSTOSCOPY W/ RETROGRADES Bilateral 3/11/2020    Procedure: CYSTOSCOPY, WITH RETROGRADE PYELOGRAM;  Surgeon: ANABELL Vásquez MD;  Location: [facility];  Service: Urology;  Laterality: Bilateral;  RN PREOP 3/4/2020    NO PAST SURGERIES          Family: No family history on file., family history of stroke in brother.    Tobacco Use    Smoking status: Never    Smokeless tobacco: Never   Substance and Sexual Activity    Alcohol use: Not Currently     Comment: socially    Drug use: No    Sexual activity: Yes     Partners: Female         Physical Exam:     Physical Examination  BP (!) 166/86   Pulse 65   Temp 97.8 °F (36.6 °C)   Resp 18   Ht 5' 11" (1.803 m)   Wt 94.8 kg (208 lb 15.9 oz)   SpO2 97%   BMI 29.15 kg/m²   Body mass index is 29.15 kg/m².    Neurological Exam  Mental Status:   Alert and oriented to name, date, location, president.   Naming/Fluency/Comprehension/Repetition intact.   Recent/remote memory, registration, attention span/concentration, fund of knowledge intact by history.    ?CN: ?  II, III, IV, " VI: EOM intact in right eye.  Clinically abductor left eye, unable to move left eye past midline.?  Vision intact in both eyes V: intact to fine touch, temperature, pain.?VII: symmetrical facial movement, nice smile, no drooping.?VIII: grossly intact to hearing.?IX, X: symmetrical palate, normal palatal elevation.?XI: 5/5 SCM and 5/5 trapezius.?XII: tongue midline, 5/5, no deviation or fasciculation.?                ?Motor:   5/5 in all 4 extremities,  No pronator drift noted                Reflexes:   2+ throughout    Sensation: on both UEs and LEs    ?Light Touch: Normal.?    ?Coordination:    ?Tremor: Absent.?Dysmetria: Absent.?Heel to Shin: Normal.?Nose to Finger: Normal.    ??Gait:    Not tested    Significant Labs:  LDL 63   Hemoglobin A1c pending    Significant Imaging:   Following images were personally reviewed  CT head without contrast 10/26/2023:   No acute intracranial abnormality    MRI brain without contrast 10/26/2023:   No acute infarct    TTE results pending    Assessment and Plan:   #dizziness  Sudden onset of dizziness and blurry vision lasting for 15 20 minutes along with mild right temporal discomfort.  No other cranial nerve deficits.  NIHSS 0.  ABCD2 score 3, low risk.  Vessel imaging unavailable at this time.  TTE results pending.  Etiology unclear for TIA versus hypertensive emergency.    Plan:   - okay to normalize blood pressure to aim SBP less than 140  - starting on aspirin 81 mg daily continue  - CTA head and neck pending  - TTE results pending  - LDL at goal.  Continue Lipitor 20 mg nightly  - hemoglobin A1c pending.  - counseled extensively regarding lifestyle modification, DASH diet.  - follow up in vascular Neurology Clinic 2-3 weeks from now.  - patient will need improved control of hypertension.  Follow up with primary care physician after maintaining a BP diary for 2 weeks.  - further medical management per primary team.    Thank you for your consult. I will sign off. Please  contact us if you have any additional questions.    Time spent on this encounter: 50 minutes. This includes face to face time and non-face to face time preparing to see the patient (eg, review of tests), obtaining and/or reviewing separately obtained history, documenting clinical information in the electronic or other health record, independently interpreting results and communicating results to the patient/family/caregiver, or care coordinator.     Odalis Maldonado MD  Neurology  [facility]

## 2023-10-26 NOTE — HPI
"73 year old male with past medical history of hypertension, dyslipidemia, palpitations, L strabismus present to the ED for  of hypertension. He reports earlier today, he ate a poboy and some pork meatballs. He then started experiencing blurred vision bilaterally that has currently resolved. Also notes a mild "tension like" R sided headache. This prompted him to check his BP which was incidentally high. Denies chest pain, dyspnea, abdominal pain, nausea, vomiting, dizziness, or other associated symptoms.                  "

## 2023-10-27 VITALS
DIASTOLIC BLOOD PRESSURE: 79 MMHG | HEIGHT: 71 IN | RESPIRATION RATE: 18 BRPM | TEMPERATURE: 98 F | BODY MASS INDEX: 29.26 KG/M2 | WEIGHT: 209 LBS | HEART RATE: 113 BPM | SYSTOLIC BLOOD PRESSURE: 134 MMHG | OXYGEN SATURATION: 98 %

## 2023-10-27 LAB
ALBUMIN SERPL BCP-MCNC: 4 G/DL (ref 3.5–5.2)
ALP SERPL-CCNC: 111 U/L (ref 55–135)
ALT SERPL W/O P-5'-P-CCNC: 15 U/L (ref 10–44)
ANION GAP SERPL CALC-SCNC: 7 MMOL/L (ref 8–16)
AST SERPL-CCNC: 17 U/L (ref 10–40)
BASOPHILS # BLD AUTO: 0.04 K/UL (ref 0–0.2)
BASOPHILS NFR BLD: 0.4 % (ref 0–1.9)
BILIRUB SERPL-MCNC: 0.6 MG/DL (ref 0.1–1)
BUN SERPL-MCNC: 10 MG/DL (ref 8–23)
CALCIUM SERPL-MCNC: 9.6 MG/DL (ref 8.7–10.5)
CHLORIDE SERPL-SCNC: 106 MMOL/L (ref 95–110)
CO2 SERPL-SCNC: 26 MMOL/L (ref 23–29)
CREAT SERPL-MCNC: 0.9 MG/DL (ref 0.5–1.4)
DIFFERENTIAL METHOD: ABNORMAL
EOSINOPHIL # BLD AUTO: 0.3 K/UL (ref 0–0.5)
EOSINOPHIL NFR BLD: 3.2 % (ref 0–8)
ERYTHROCYTE [DISTWIDTH] IN BLOOD BY AUTOMATED COUNT: 12.5 % (ref 11.5–14.5)
EST. GFR  (NO RACE VARIABLE): >60 ML/MIN/1.73 M^2
GLUCOSE SERPL-MCNC: 97 MG/DL (ref 70–110)
HCT VFR BLD AUTO: 47.7 % (ref 40–54)
HGB BLD-MCNC: 15.8 G/DL (ref 14–18)
IMM GRANULOCYTES # BLD AUTO: 0.03 K/UL (ref 0–0.04)
IMM GRANULOCYTES NFR BLD AUTO: 0.3 % (ref 0–0.5)
LYMPHOCYTES # BLD AUTO: 2.1 K/UL (ref 1–4.8)
LYMPHOCYTES NFR BLD: 21.5 % (ref 18–48)
MAGNESIUM SERPL-MCNC: 2.1 MG/DL (ref 1.6–2.6)
MCH RBC QN AUTO: 32.6 PG (ref 27–31)
MCHC RBC AUTO-ENTMCNC: 33.1 G/DL (ref 32–36)
MCV RBC AUTO: 98 FL (ref 82–98)
MONOCYTES # BLD AUTO: 1.1 K/UL (ref 0.3–1)
MONOCYTES NFR BLD: 11.1 % (ref 4–15)
NEUTROPHILS # BLD AUTO: 6.2 K/UL (ref 1.8–7.7)
NEUTROPHILS NFR BLD: 63.5 % (ref 38–73)
NRBC BLD-RTO: 0 /100 WBC
PHOSPHATE SERPL-MCNC: 2.7 MG/DL (ref 2.7–4.5)
PLATELET # BLD AUTO: 214 K/UL (ref 150–450)
PMV BLD AUTO: 11.2 FL (ref 9.2–12.9)
POTASSIUM SERPL-SCNC: 4 MMOL/L (ref 3.5–5.1)
PROT SERPL-MCNC: 7.9 G/DL (ref 6–8.4)
RBC # BLD AUTO: 4.85 M/UL (ref 4.6–6.2)
SODIUM SERPL-SCNC: 139 MMOL/L (ref 136–145)
WBC # BLD AUTO: 9.71 K/UL (ref 3.9–12.7)

## 2023-10-27 PROCEDURE — 84100 ASSAY OF PHOSPHORUS: CPT | Performed by: NURSE PRACTITIONER

## 2023-10-27 PROCEDURE — 85025 COMPLETE CBC W/AUTO DIFF WBC: CPT | Performed by: NURSE PRACTITIONER

## 2023-10-27 PROCEDURE — 25000003 PHARM REV CODE 250: Performed by: PHYSICIAN ASSISTANT

## 2023-10-27 PROCEDURE — 25000003 PHARM REV CODE 250: Performed by: NURSE PRACTITIONER

## 2023-10-27 PROCEDURE — 83735 ASSAY OF MAGNESIUM: CPT | Performed by: NURSE PRACTITIONER

## 2023-10-27 PROCEDURE — 25000003 PHARM REV CODE 250: Performed by: STUDENT IN AN ORGANIZED HEALTH CARE EDUCATION/TRAINING PROGRAM

## 2023-10-27 PROCEDURE — 80053 COMPREHEN METABOLIC PANEL: CPT | Performed by: NURSE PRACTITIONER

## 2023-10-27 PROCEDURE — G0378 HOSPITAL OBSERVATION PER HR: HCPCS

## 2023-10-27 PROCEDURE — 36415 COLL VENOUS BLD VENIPUNCTURE: CPT | Performed by: NURSE PRACTITIONER

## 2023-10-27 RX ORDER — HYDRALAZINE HYDROCHLORIDE 25 MG/1
25 TABLET, FILM COATED ORAL EVERY 8 HOURS
Status: DISCONTINUED | OUTPATIENT
Start: 2023-10-27 | End: 2023-10-27 | Stop reason: HOSPADM

## 2023-10-27 RX ORDER — HYDRALAZINE HYDROCHLORIDE 25 MG/1
25 TABLET, FILM COATED ORAL EVERY 8 HOURS
Status: DISCONTINUED | OUTPATIENT
Start: 2023-10-27 | End: 2023-10-27

## 2023-10-27 RX ORDER — HYDRALAZINE HYDROCHLORIDE 25 MG/1
25 TABLET, FILM COATED ORAL EVERY 8 HOURS
Qty: 90 TABLET | Refills: 0 | Status: SHIPPED | OUTPATIENT
Start: 2023-10-27 | End: 2023-11-03 | Stop reason: SDUPTHER

## 2023-10-27 RX ORDER — AMLODIPINE BESYLATE 10 MG/1
10 TABLET ORAL DAILY
Qty: 30 TABLET | Refills: 0 | Status: SHIPPED | OUTPATIENT
Start: 2023-10-28 | End: 2023-11-03 | Stop reason: SDUPTHER

## 2023-10-27 RX ADMIN — HYDRALAZINE HYDROCHLORIDE 25 MG: 25 TABLET, FILM COATED ORAL at 09:10

## 2023-10-27 RX ADMIN — METOPROLOL SUCCINATE 50 MG: 50 TABLET, EXTENDED RELEASE ORAL at 09:10

## 2023-10-27 RX ADMIN — AMLODIPINE BESYLATE 10 MG: 5 TABLET ORAL at 09:10

## 2023-10-27 RX ADMIN — ASPIRIN 81 MG: 81 TABLET, COATED ORAL at 09:10

## 2023-10-27 RX ADMIN — HYDRALAZINE HYDROCHLORIDE 25 MG: 25 TABLET, FILM COATED ORAL at 03:10

## 2023-10-27 NOTE — NURSING
Patient's blood pressure elevated. Prn hydralazine given at this time. Will recheck blood pressure.

## 2023-10-27 NOTE — PLAN OF CARE
10/27/23 1101   Final Note   Assessment Type Final Discharge Note   Anticipated Discharge Disposition Home   Hospital Resources/Appts/Education Provided Appointments scheduled and added to AVS   Post-Acute Status   Post-Acute Authorization Other   Other Status No Post-Acute Service Needs     Pts nurse Samira notified that the pt is clear for d/c from CM standpoint

## 2023-10-27 NOTE — PLAN OF CARE
Problem: Adult Inpatient Plan of Care  Goal: Plan of Care Review  Outcome: Ongoing, Progressing     Problem: Pain Acute  Goal: Acceptable Pain Control and Functional Ability  Outcome: Ongoing, Progressing     Problem: Tissue Perfusion Altered  Goal: Improved Tissue Perfusion  Outcome: Ongoing, Progressing

## 2023-10-27 NOTE — PLAN OF CARE
Patient alert and oriented x4. Respirations even and unlabored. No distress noted. Skin warm and dry. No breakdown noted. Patient denies pain. Pain medication available as needed. Patient ambulated to bathroom as tolerated. Patient has no complaints at this time. Instructed to call for any needs. Bed in lowest position. Call bell within reach.     Problem: Adult Inpatient Plan of Care  Goal: Plan of Care Review  Outcome: Ongoing, Progressing  Flowsheets (Taken 10/27/2023 0351)  Plan of Care Reviewed With: patient  Goal: Patient-Specific Goal (Individualized)  Outcome: Ongoing, Progressing  Goal: Absence of Hospital-Acquired Illness or Injury  Outcome: Ongoing, Progressing  Intervention: Identify and Manage Fall Risk  Flowsheets (Taken 10/27/2023 0351)  Safety Promotion/Fall Prevention:   assistive device/personal item within reach   bed alarm refused   nonskid shoes/socks when out of bed   high risk medications identified     Problem: Adult Inpatient Plan of Care  Goal: Plan of Care Review  Outcome: Ongoing, Progressing  Flowsheets (Taken 10/27/2023 0351)  Plan of Care Reviewed With: patient  Goal: Patient-Specific Goal (Individualized)  Outcome: Ongoing, Progressing  Goal: Absence of Hospital-Acquired Illness or Injury  Outcome: Ongoing, Progressing  Intervention: Identify and Manage Fall Risk  Flowsheets (Taken 10/27/2023 0351)  Safety Promotion/Fall Prevention:   assistive device/personal item within reach   bed alarm refused   nonskid shoes/socks when out of bed   high risk medications identified     Problem: Fall Injury Risk  Goal: Absence of Fall and Fall-Related Injury  10/27/2023 0351 by Oral Ron RN  Outcome: Ongoing, Progressing  10/27/2023 0350 by Oral Ron RN  Outcome: Ongoing, Progressing  Intervention: Identify and Manage Contributors  10/27/2023 0351 by Oral Ron, RN  Flowsheets (Taken 10/27/2023 0351)  Medication Review/Management:   medications  reviewed   high-risk medications identified  10/27/2023 0350 by Oral Ron, RN  Flowsheets (Taken 10/27/2023 0350)  Self-Care Promotion:   independence encouraged   BADL personal objects within reach  Medication Review/Management:   medications reviewed   high-risk medications identified     Problem: Pain Acute  Goal: Acceptable Pain Control and Functional Ability  Outcome: Ongoing, Progressing     Problem: Hypertension Comorbidity  Goal: Blood Pressure in Desired Range  Outcome: Ongoing, Progressing

## 2023-10-27 NOTE — NURSING
Patient's blood pressure rechecked at this time. Patient's blood pressure 168/85. Orders parameters not met for administration of hydralazine. Will continue to monitor blood pressure and administer hydralazine once order parameters are met.

## 2023-10-27 NOTE — NURSING
[facility] [place] 4 [place]      10/26/23      Skin assessed on: Q Shift      [x] No Pressure Injuries Present    [x]Prevention Measures Documented    [] Yes LDA  for Pressure Injury Previously documented     [] Yes New Pressure Injury Discovered   [] LDA for New Pressure Injury Added      Attending RN:  Oral Ron RN     Second RN:  Di Luis Rn

## 2023-10-27 NOTE — PLAN OF CARE
Problem: Fall Injury Risk  Goal: Absence of Fall and Fall-Related Injury  Outcome: Adequate for Care Transition     Problem: Pain Acute  Goal: Acceptable Pain Control and Functional Ability  Outcome: Adequate for Care Transition     Problem: Tissue Perfusion Altered  Goal: Improved Tissue Perfusion  Outcome: Adequate for Care Transition     Problem: Hypertension Comorbidity  Goal: Blood Pressure in Desired Range  Outcome: Adequate for Care Transition     Problem: Adult Inpatient Plan of Care  Goal: Plan of Care Review  Outcome: Adequate for Care Transition  Goal: Patient-Specific Goal (Individualized)  Outcome: Adequate for Care Transition  Goal: Absence of Hospital-Acquired Illness or Injury  Outcome: Adequate for Care Transition  Goal: Optimal Comfort and Wellbeing  Outcome: Adequate for Care Transition  Goal: Readiness for Transition of Care  Outcome: Adequate for Care Transition

## 2023-10-27 NOTE — NURSING
[facility] [place]  Nurses Note -- 4 Eyes      10/27/2023       Skin assessed on: Q Shift      [x] No Pressure Injuries Present    []Prevention Measures Documented    [] Yes LDA  for Pressure Injury Previously documented     [] Yes New Pressure Injury Discovered   [] LDA for New Pressure Injury Added      Attending RN:  Bhumi Rivas RN     Second RN:  Oral Dorado RN

## 2023-10-31 ENCOUNTER — OFFICE VISIT (OUTPATIENT)
Dept: PODIATRY | Facility: CLINIC | Age: 73
End: 2023-10-31
Payer: MEDICARE

## 2023-10-31 VITALS
WEIGHT: 209 LBS | BODY MASS INDEX: 29.26 KG/M2 | DIASTOLIC BLOOD PRESSURE: 74 MMHG | SYSTOLIC BLOOD PRESSURE: 137 MMHG | HEART RATE: 108 BPM | HEIGHT: 71 IN

## 2023-10-31 DIAGNOSIS — M79.675 TOE PAIN, LEFT: ICD-10-CM

## 2023-10-31 DIAGNOSIS — L60.0 INGROWN NAIL: Primary | ICD-10-CM

## 2023-10-31 PROCEDURE — 1101F PR PT FALLS ASSESS DOC 0-1 FALLS W/OUT INJ PAST YR: ICD-10-PCS | Mod: CPTII,S$GLB,, | Performed by: PODIATRIST

## 2023-10-31 PROCEDURE — 99999 PR PBB SHADOW E&M-EST. PATIENT-LVL III: CPT | Mod: PBBFAC,,, | Performed by: PODIATRIST

## 2023-10-31 PROCEDURE — 3078F PR MOST RECENT DIASTOLIC BLOOD PRESSURE < 80 MM HG: ICD-10-PCS | Mod: CPTII,S$GLB,, | Performed by: PODIATRIST

## 2023-10-31 PROCEDURE — 99999 PR PBB SHADOW E&M-EST. PATIENT-LVL III: ICD-10-PCS | Mod: PBBFAC,,, | Performed by: PODIATRIST

## 2023-10-31 PROCEDURE — 99213 PR OFFICE/OUTPT VISIT, EST, LEVL III, 20-29 MIN: ICD-10-PCS | Mod: S$GLB,,, | Performed by: PODIATRIST

## 2023-10-31 PROCEDURE — 3044F PR MOST RECENT HEMOGLOBIN A1C LEVEL <7.0%: ICD-10-PCS | Mod: CPTII,S$GLB,, | Performed by: PODIATRIST

## 2023-10-31 PROCEDURE — 3288F FALL RISK ASSESSMENT DOCD: CPT | Mod: CPTII,S$GLB,, | Performed by: PODIATRIST

## 2023-10-31 PROCEDURE — 1101F PT FALLS ASSESS-DOCD LE1/YR: CPT | Mod: CPTII,S$GLB,, | Performed by: PODIATRIST

## 2023-10-31 PROCEDURE — 99213 OFFICE O/P EST LOW 20 MIN: CPT | Mod: S$GLB,,, | Performed by: PODIATRIST

## 2023-10-31 PROCEDURE — 1125F PR PAIN SEVERITY QUANTIFIED, PAIN PRESENT: ICD-10-PCS | Mod: CPTII,S$GLB,, | Performed by: PODIATRIST

## 2023-10-31 PROCEDURE — 1159F PR MEDICATION LIST DOCUMENTED IN MEDICAL RECORD: ICD-10-PCS | Mod: CPTII,S$GLB,, | Performed by: PODIATRIST

## 2023-10-31 PROCEDURE — 3288F PR FALLS RISK ASSESSMENT DOCUMENTED: ICD-10-PCS | Mod: CPTII,S$GLB,, | Performed by: PODIATRIST

## 2023-10-31 PROCEDURE — 1159F MED LIST DOCD IN RCRD: CPT | Mod: CPTII,S$GLB,, | Performed by: PODIATRIST

## 2023-10-31 PROCEDURE — 3075F PR MOST RECENT SYSTOLIC BLOOD PRESS GE 130-139MM HG: ICD-10-PCS | Mod: CPTII,S$GLB,, | Performed by: PODIATRIST

## 2023-10-31 PROCEDURE — 3044F HG A1C LEVEL LT 7.0%: CPT | Mod: CPTII,S$GLB,, | Performed by: PODIATRIST

## 2023-10-31 PROCEDURE — 3075F SYST BP GE 130 - 139MM HG: CPT | Mod: CPTII,S$GLB,, | Performed by: PODIATRIST

## 2023-10-31 PROCEDURE — 3008F PR BODY MASS INDEX (BMI) DOCUMENTED: ICD-10-PCS | Mod: CPTII,S$GLB,, | Performed by: PODIATRIST

## 2023-10-31 PROCEDURE — 3078F DIAST BP <80 MM HG: CPT | Mod: CPTII,S$GLB,, | Performed by: PODIATRIST

## 2023-10-31 PROCEDURE — 3008F BODY MASS INDEX DOCD: CPT | Mod: CPTII,S$GLB,, | Performed by: PODIATRIST

## 2023-10-31 PROCEDURE — 1125F AMNT PAIN NOTED PAIN PRSNT: CPT | Mod: CPTII,S$GLB,, | Performed by: PODIATRIST

## 2023-10-31 NOTE — PROGRESS NOTES
Subjective:     Patient ID: Rod Victor is a 73 y.o. male.    Chief Complaint: Ingrown Toenail    Rod is a 73 y.o. male who presents to the clinic complaining of painful ingrown toenail on the left foot. Left great toe lateral border.    Review of Systems   Constitutional: Negative for chills.   Cardiovascular:  Negative for chest pain and claudication.   Respiratory:  Negative for cough.    Skin:  Positive for color change, dry skin and nail changes.   Musculoskeletal:  Positive for joint pain.   Gastrointestinal:  Negative for nausea.   Neurological:  Positive for paresthesias. Negative for numbness.   Psychiatric/Behavioral:  The patient is not nervous/anxious.         Objective:     Physical Exam  Constitutional:       Appearance: He is well-developed.      Comments: Oriented to time, place, and person.   Cardiovascular:      Comments: DP and PT pulses are palpable bilaterally. 3 sec capillary refill time and toes and feet are warm to touch proximally .  There is  hair growth on the feet and toes b/l. There is no edema b/l. No spider veins or varicosities present b/l.     Musculoskeletal:      Comments: Equinus noted b/l ankles with < 10 deg DF noted. MMT 5/5 in DF/PF/Inv/Ev resistance with no reproduction of pain in any direction. Passive range of motion of ankle and pedal joints is painless b/l.     Feet:      Right foot:      Skin integrity: No callus or dry skin.      Left foot:      Skin integrity: No callus or dry skin.   Lymphadenopathy:      Comments: Negative lymphadenopathy bilateral popliteal fossa and tarsal tunnel.   Skin:     Comments: Lateral  hallux nail margin of left  foot with ingrown nail plate. Surrounding erythema and minimal edema is noted.          Neurological:      Mental Status: He is alert.      Comments: Light touch, proprioception, and sharp/dull sensation are all intact bilaterally. Protective threshold with the Allen-Wienstein monofilament is intact bilaterally.     Psychiatric:         Behavior: Behavior is cooperative.           Assessment:      Encounter Diagnoses   Name Primary?    Ingrown nail Yes    Toe pain, left      Plan:     Rod was seen today for ingrown toenail.    Diagnoses and all orders for this visit:    Ingrown nail    Toe pain, left      I counseled the patient on his conditions, their implications and medical management.      Discussed treatment options with patient. Options included soaking, avulsion and matrixectomy. Risks and benefits discussed and all questions were answered. The patient wishes to proceed with  Nail avulsion at a later date      In depth conversation on the treatment of ingrown nail; partial nail avulsion vs chemical matrixectomy vs conservative treatment of soaking and nail trimming    Utilizing sterile toenail clippers I aggressively trimmed  the offending left hallux lateral   nail border approximately 3 mm from its edge and carried the nail plate incision down at an angle in order to wedge out the offending cryptotic portion of the nail plate. The offending border was then removed in toto. The remaining nail was grinded down with an electric  down to nail bed.  No blood was drawn. Patient tolerated the procedure well and related significant relief.    RTC PRN

## 2023-11-03 ENCOUNTER — OFFICE VISIT (OUTPATIENT)
Dept: FAMILY MEDICINE | Facility: CLINIC | Age: 73
End: 2023-11-03
Payer: MEDICARE

## 2023-11-03 VITALS
SYSTOLIC BLOOD PRESSURE: 110 MMHG | TEMPERATURE: 98 F | OXYGEN SATURATION: 97 % | BODY MASS INDEX: 29.07 KG/M2 | DIASTOLIC BLOOD PRESSURE: 72 MMHG | HEART RATE: 105 BPM | WEIGHT: 208.44 LBS

## 2023-11-03 DIAGNOSIS — I10 ESSENTIAL HYPERTENSION: Primary | ICD-10-CM

## 2023-11-03 DIAGNOSIS — R53.83 FATIGUE, UNSPECIFIED TYPE: ICD-10-CM

## 2023-11-03 DIAGNOSIS — E66.3 OVERWEIGHT: ICD-10-CM

## 2023-11-03 DIAGNOSIS — R06.83 SNORING: ICD-10-CM

## 2023-11-03 DIAGNOSIS — R00.2 PALPITATIONS: ICD-10-CM

## 2023-11-03 PROCEDURE — 3008F BODY MASS INDEX DOCD: CPT | Mod: CPTII,S$GLB,, | Performed by: INTERNAL MEDICINE

## 2023-11-03 PROCEDURE — 3288F FALL RISK ASSESSMENT DOCD: CPT | Mod: CPTII,S$GLB,, | Performed by: INTERNAL MEDICINE

## 2023-11-03 PROCEDURE — 1160F RVW MEDS BY RX/DR IN RCRD: CPT | Mod: CPTII,S$GLB,, | Performed by: INTERNAL MEDICINE

## 2023-11-03 PROCEDURE — 3044F PR MOST RECENT HEMOGLOBIN A1C LEVEL <7.0%: ICD-10-PCS | Mod: CPTII,S$GLB,, | Performed by: INTERNAL MEDICINE

## 2023-11-03 PROCEDURE — 1159F MED LIST DOCD IN RCRD: CPT | Mod: CPTII,S$GLB,, | Performed by: INTERNAL MEDICINE

## 2023-11-03 PROCEDURE — 1126F PR PAIN SEVERITY QUANTIFIED, NO PAIN PRESENT: ICD-10-PCS | Mod: CPTII,S$GLB,, | Performed by: INTERNAL MEDICINE

## 2023-11-03 PROCEDURE — 3074F SYST BP LT 130 MM HG: CPT | Mod: CPTII,S$GLB,, | Performed by: INTERNAL MEDICINE

## 2023-11-03 PROCEDURE — 3074F PR MOST RECENT SYSTOLIC BLOOD PRESSURE < 130 MM HG: ICD-10-PCS | Mod: CPTII,S$GLB,, | Performed by: INTERNAL MEDICINE

## 2023-11-03 PROCEDURE — 1160F PR REVIEW ALL MEDS BY PRESCRIBER/CLIN PHARMACIST DOCUMENTED: ICD-10-PCS | Mod: CPTII,S$GLB,, | Performed by: INTERNAL MEDICINE

## 2023-11-03 PROCEDURE — 99214 PR OFFICE/OUTPT VISIT, EST, LEVL IV, 30-39 MIN: ICD-10-PCS | Mod: S$GLB,,, | Performed by: INTERNAL MEDICINE

## 2023-11-03 PROCEDURE — 3078F PR MOST RECENT DIASTOLIC BLOOD PRESSURE < 80 MM HG: ICD-10-PCS | Mod: CPTII,S$GLB,, | Performed by: INTERNAL MEDICINE

## 2023-11-03 PROCEDURE — 3288F PR FALLS RISK ASSESSMENT DOCUMENTED: ICD-10-PCS | Mod: CPTII,S$GLB,, | Performed by: INTERNAL MEDICINE

## 2023-11-03 PROCEDURE — 99214 OFFICE O/P EST MOD 30 MIN: CPT | Mod: S$GLB,,, | Performed by: INTERNAL MEDICINE

## 2023-11-03 PROCEDURE — 1101F PT FALLS ASSESS-DOCD LE1/YR: CPT | Mod: CPTII,S$GLB,, | Performed by: INTERNAL MEDICINE

## 2023-11-03 PROCEDURE — 99999 PR PBB SHADOW E&M-EST. PATIENT-LVL IV: CPT | Mod: PBBFAC,,, | Performed by: INTERNAL MEDICINE

## 2023-11-03 PROCEDURE — 1159F PR MEDICATION LIST DOCUMENTED IN MEDICAL RECORD: ICD-10-PCS | Mod: CPTII,S$GLB,, | Performed by: INTERNAL MEDICINE

## 2023-11-03 PROCEDURE — 1126F AMNT PAIN NOTED NONE PRSNT: CPT | Mod: CPTII,S$GLB,, | Performed by: INTERNAL MEDICINE

## 2023-11-03 PROCEDURE — 3008F PR BODY MASS INDEX (BMI) DOCUMENTED: ICD-10-PCS | Mod: CPTII,S$GLB,, | Performed by: INTERNAL MEDICINE

## 2023-11-03 PROCEDURE — 1101F PR PT FALLS ASSESS DOC 0-1 FALLS W/OUT INJ PAST YR: ICD-10-PCS | Mod: CPTII,S$GLB,, | Performed by: INTERNAL MEDICINE

## 2023-11-03 PROCEDURE — 3078F DIAST BP <80 MM HG: CPT | Mod: CPTII,S$GLB,, | Performed by: INTERNAL MEDICINE

## 2023-11-03 PROCEDURE — 3044F HG A1C LEVEL LT 7.0%: CPT | Mod: CPTII,S$GLB,, | Performed by: INTERNAL MEDICINE

## 2023-11-03 PROCEDURE — 99999 PR PBB SHADOW E&M-EST. PATIENT-LVL IV: ICD-10-PCS | Mod: PBBFAC,,, | Performed by: INTERNAL MEDICINE

## 2023-11-03 RX ORDER — HYDRALAZINE HYDROCHLORIDE 25 MG/1
25 TABLET, FILM COATED ORAL 3 TIMES DAILY PRN
Qty: 90 TABLET | Refills: 3 | Status: SHIPPED | OUTPATIENT
Start: 2023-11-03 | End: 2024-02-14 | Stop reason: ALTCHOICE

## 2023-11-03 RX ORDER — AMLODIPINE BESYLATE 10 MG/1
10 TABLET ORAL DAILY
Qty: 90 TABLET | Refills: 3 | Status: SHIPPED | OUTPATIENT
Start: 2023-11-03

## 2023-11-03 NOTE — PROGRESS NOTES
This note was created by combination of typed  and M-Modal dictation.  Transcription errors may be present.  If there are any questions, please contact me.    Assessment and Plan:   Assessment and Plan   Essential hypertension  -hospitalization for hypertensive urgency with lightheadedness and blurred vision transient.  Seen by Neurology, recommendations were aspirin.  Started on amlodipine with hydralazine to take for SBP greater than 140, they have been dosing it for SBP greater than 120.    Prior to that had already been eating fairly reasonably with attempts at salt restricted diet.  Continues to do so.  Long discussion with the patient and wife today.  He notes some fatigue which he thinks actually may have preceded starting the amlodipine.  Wonders if it is due to the metoprolol.  He is going to arrange follow-up with Cardiology to discuss the dose.  Okay to stay on amlodipine  Hydralazine for SBP greater than 140  TSH was normal   Previously imaged adrenal glands without nodules, 2021  We talked about other possible contributors including sleep apnea.  Okay to stay on aspirin low-dose.  -     amLODIPine (NORVASC) 10 MG tablet; Take 1 tablet (10 mg total) by mouth once daily.  Dispense: 90 tablet; Refill: 3  -     hydrALAZINE (APRESOLINE) 25 MG tablet; Take 1 tablet (25 mg total) by mouth 3 (three) times daily as needed (for BP > 140). For BP > 140  Dispense: 90 tablet; Refill: 3    Overweight  Fatigue, unspecified type  Snoring  -lengthy discussion with the patient and wife today about signs and symptoms of occult sleep apnea.  And resultant causing worsening blood pressure.  We talked about diagnostic procedures and treatment.  Namely home sleep testing and autoPAP treatment.  He would like to think about it 1st but in regards to fatigue he would like to discuss with Cardiology about adjusting metoprolol dose 1st.  If he decides to proceed with that he can message me and I can order a home sleep  test             Medications Discontinued During This Encounter   Medication Reason    amLODIPine (NORVASC) 10 MG tablet Reorder    hydrALAZINE (APRESOLINE) 25 MG tablet Reorder       meds sent this encounter:     Medications Ordered This Encounter   Medications    amLODIPine (NORVASC) 10 MG tablet     Sig: Take 1 tablet (10 mg total) by mouth once daily.     Dispense:  90 tablet     Refill:  3     .    hydrALAZINE (APRESOLINE) 25 MG tablet     Sig: Take 1 tablet (25 mg total) by mouth 3 (three) times daily as needed (for BP > 140). For BP > 140     Dispense:  90 tablet     Refill:  3     .        Follow Up:   Future Appointments   Date Time Provider Department Center   11/8/2023  2:00 PM Lorena Palmer DPM [facility] POD [place]   6/13/2024  8:00 AM LAB, [facility] [place]   6/20/2024  1:40 PM Luis Miguel Buckley MD [facility] FAM MED [place]          Subjective:   Subjective   Chief Complaint   Patient presents with    Hospital Follow Up    Venus Velasco is a 73 y.o. male.     Social History     Tobacco Use    Smoking status: Never    Smokeless tobacco: Never   Substance Use Topics    Alcohol use: Not Currently     Comment: socially      Social History     Occupational History    Occupation: retired x 2017      Social History     Social History Narrative    Not on file       Last appointment with this clinic was 6/19/2023. Last visit with me 6/19/2023   To summarize last visit and events leading up to today:  Incidental aortic atherosclerosis.  Started on atorvastatin   Palpitations with evaluation showing PACs.  On metoprolol.  Followed by Cardiology.  Most recently seen 06/02/2023, stable, follow-up 1 year  06/03/2021 TTE LV normal size with concentric hypertrophy and normal systolic function LVEF 55%.  Normal diastolic function.  Normal RV size and systolic function.  Intermediate CVP 8. PA pressure 35.   06/03/2021 Holter 4 beat run of VT.  PACs.  Started on Toprol XL for the PACs  07/09/2021  colonoscopy sigmoid, descending diverticulosis.  Internal hemorrhoids.  2 mm sigmoid polyp.  Path not included.  Gout? christmastime 12/2022; check future labs. Work on diet.     Hospitalization 10/26 through 10/27 initial presentation of headache.  High blood pressure.  Hospital Course:   73 year old male with past medical history of hypertension, dyslipidemia, palpitations, L strabismus admitted to observation on 10/26/2023 for further evaluation of HTN urgency and blurred vision. He has been compliant with his medicatoins. Admit he ate a poboy for his birthday which may have been high in sodium. Labs unremarkable. CT head with no CT evidence of acute intracranial abnormality. Findings suggestive of chronic microvascular ischemic change. MRI brain w/No evidence of acute intracranial pathology.  Neurology consulted-recommends CTA head and neck. Okay for normotension. CTA head and neck showed No large vessel occlusion in the intracranial circulation. . Echo showed estimated ejection fraction of 60 - 65%. Grade I diastolic dysfunction.Bubble study negative . okay for discharge per neurology. Resumed home metoprolol. Initiated pt on norvasc and PO hydralazine with improvement in his blood pressure. Pt denies any fever, headaches, vision changes, chest pain, shortness of breath, palpitations, abdominal pain, nausea, vomiting, or any new weaknesses. Feels ready to go home. Patient's exam on discharge was as follow: Patient is alert and oriented, appears in no acute distress, heart with regular rate and rhythm, lungs clear to asculation with non-labored breathing, abdomen soft, and no new weaknesses or focal deficits seen. Bilateral lower extremities without any edema or calf tenderness.     Discharged on amlodipine 10, hydralazine 25 t.i.d., aspirin    MRI brain negative   CT head negative   CTA head and neck negative   A1c 5.3  Lipid was good    Today's visit:  Birthday last Wednesday  Ate a catfish po boy that day  "with gumbo  And that evening ate some of his wife's po boy and thinks had sausage/pork in it which he tries to avoid  Started getting woozy, lightheaded.   Wife checked BP high, remained high so went to ED  Prior to that had not checked BP recently.    No prior diagnosis of hypertension  Over the years BP has been borderline high but in the last year not really    Sometimes some accompanying low energy.  However he thinks that started prior to starting his current blood pressure regimen.  Has been monitoring BP, overall has been good  But last night 150 so took the hydralazine  Takes hydralazine for SBP > 120.     Snoring, but no was an apnea.  Discussed possible sleep apnea as a cause of fatigue.  And also for uncontrolled blood pressure.  Checks BP TID now  No pedal edema.      Taking aspirin 81    Patient Care Team:  Luis Miguel Buckley MD as PCP - General (Internal Medicine)  Angela Triplett MA as Care Coordinator  Preston Mercer MD (Inactive) as Consulting Physician (Gastroenterology)    Patient Active Problem List    Diagnosis Date Noted    Essential hypertension 10/26/2023    Dyslipidemia 12/21/2022    Palpitations, holter PACs; toprol 05/19/2021 06/03/2021 TTE LV normal size with concentric hypertrophy and normal systolic function LVEF 55%.  Normal diastolic function.  Normal RV size and systolic function.  Intermediate CVP 8. PA pressure 35.   06/03/2021 Holter 4 beat run of VT.  PACs.  Started on Toprol XL for the PACs      Atherosclerosis of aorta 10/20/2020     " Atherosclerotic calcification is noted of the aorta and its branches" - CT Abdomen Pelvis Without Contrast 2-7-2020      Nodule of upper lobe of right lung 2 mm incidental on CT 2/2020; nonsmoker. no further testing. 02/10/2020     2/2020 CT abd/pelvis: 2. 2 mm pulmonary nodule within the right upper lobe versus atelectasis.  For a solid nodule <6 mm, Fleischner Society 2017 guidelines recommend no routine follow up for a low risk " patient, or follow-up with non-contrast chest CT at 12 months in a high risk patient.      Renal parapelvic cyst giving the appearance of hydronephrosis, not truly hydronephrosis.  no further testing/monitoring 02/10/2020     1/29/2020 renal US: Mild left hydronephrosis for which clinical and historical correlation is needed. Bilateral ureteral jets are noted.  2/7/2020 CT abd/pelvis without: 1. Moderate left hydronephrosis without hydroureter.  The appearance is nonspecific however may reflect that of chronic UPJ obstruction.  No findings to suggest obstructive uropathy or nephrolithiasis.   6/4/21 CT urogram: No evidence of hydronephrosis.  Appearance on previous CT and ultrasound was related to parapelvic cysts evident on current exam.  Colonic diverticula without evidence of diverticulitis  Small fat containing umbilical hernia.      History of colonic polyps 07/09/2021 colonoscopy sigmoid, descending diverticulosis.  Internal hemorrhoids.  2 mm sigmoid polyp.  Path not included 10/24/2018     adenoma 2013  07/09/2021 colonoscopy sigmoid, descending diverticulosis.  Internal hemorrhoids.  2 mm sigmoid polyp.  Path not included      Strabismus 10/24/2018    Rotator cuff tendonitis, left 10/24/2018    Left shoulder pain 09/13/2017       PAST MEDICAL PROBLEMS, PAST SURGICAL HISTORY: please see relevant portions of the electronic medical record    ALLERGIES AND MEDICATIONS: updated and reviewed.  Medication List with Changes/Refills   Current Medications    AMLODIPINE (NORVASC) 10 MG TABLET    Take 1 tablet (10 mg total) by mouth once daily.    ASPIRIN (ECOTRIN) 81 MG EC TABLET    Take 1 tablet (81 mg total) by mouth once daily.    ATORVASTATIN (LIPITOR) 20 MG TABLET    Take 1 tablet (20 mg total) by mouth once daily.    HYDRALAZINE (APRESOLINE) 25 MG TABLET    Take 1 tablet (25 mg total) by mouth every 8 (eight) hours.    METOPROLOL SUCCINATE (TOPROL-XL) 50 MG 24 HR TABLET    Take 1 tablet by mouth once daily          Objective:   Objective   Physical Exam   Vitals:    11/03/23 0907   BP: 110/72   BP Location: Left arm   Patient Position: Sitting   BP Method: Large (Manual)   Pulse: 105   Temp: 98.4 °F (36.9 °C)   TempSrc: Oral   SpO2: 97%   Weight: 94.6 kg (208 lb 7.1 oz)    Body mass index is 29.07 kg/m².  Weight: 94.6 kg (208 lb 7.1 oz)         Physical Exam  Constitutional:       General: He is not in acute distress.     Appearance: He is well-developed.   Eyes:      Extraocular Movements: Extraocular movements intact.   Cardiovascular:      Rate and Rhythm: Normal rate and regular rhythm.      Heart sounds: Normal heart sounds. No murmur heard.  Pulmonary:      Effort: Pulmonary effort is normal.      Breath sounds: Normal breath sounds.   Musculoskeletal:         General: Normal range of motion.      Right lower leg: No edema.      Left lower leg: No edema.   Skin:     General: Skin is warm and dry.   Neurological:      Mental Status: He is alert and oriented to person, place, and time.      Coordination: Coordination normal.   Psychiatric:         Behavior: Behavior normal.         Thought Content: Thought content normal.

## 2023-11-03 NOTE — PATIENT INSTRUCTIONS
TAKE THE METOPROLOL  TAKE THE AMLODIPINE  TAKE THE ASPIRIN      FOR BLOOD PRESSURE > 140, THEN TAKE THE HYDRALAZINE AS NEEDED  OTHERWISE OK NOT TO TAKE THAT ONE.

## 2023-12-13 ENCOUNTER — PATIENT MESSAGE (OUTPATIENT)
Dept: FAMILY MEDICINE | Facility: CLINIC | Age: 73
End: 2023-12-13
Payer: MEDICARE

## 2023-12-22 ENCOUNTER — PATIENT MESSAGE (OUTPATIENT)
Dept: FAMILY MEDICINE | Facility: CLINIC | Age: 73
End: 2023-12-22
Payer: MEDICARE

## 2023-12-22 DIAGNOSIS — E78.5 DYSLIPIDEMIA: Primary | ICD-10-CM

## 2023-12-22 RX ORDER — ASPIRIN 81 MG/1
81 TABLET ORAL DAILY
Qty: 90 TABLET | Refills: 3 | Status: SHIPPED | OUTPATIENT
Start: 2023-12-22

## 2023-12-28 ENCOUNTER — TELEPHONE (OUTPATIENT)
Dept: FAMILY MEDICINE | Facility: CLINIC | Age: 73
End: 2023-12-28
Payer: MEDICARE

## 2024-01-09 ENCOUNTER — TELEPHONE (OUTPATIENT)
Dept: CARDIOLOGY | Facility: CLINIC | Age: 74
End: 2024-01-09
Payer: MEDICARE

## 2024-01-09 RX ORDER — METOPROLOL SUCCINATE 50 MG/1
TABLET, EXTENDED RELEASE ORAL
Qty: 90 TABLET | Refills: 3 | Status: SHIPPED | OUTPATIENT
Start: 2024-01-09

## 2024-01-09 NOTE — TELEPHONE ENCOUNTER
----- Message from Julia Conley MA sent at 1/8/2024  3:33 PM CST -----  Regarding: FW: PA needed    ----- Message -----  From: Maya Sierra  Sent: 1/8/2024  10:39 AM CST  To: [facility] Staff  Subject: PA needed                                        Name of Who is Calling: Rod           What is the request in detail: Patient is requesting a call back to get a PA for metoprolol succinate (TOPROL-XL) 50 MG 24 hr tablet.           Can the clinic reply by [facility]: No           What [facility]:  [phone]

## 2024-01-09 NOTE — TELEPHONE ENCOUNTER
----- Message from Jean Junior sent at 1/9/2024 12:01 PM CST -----  Regarding: Self [phone]  Type: Patient Call Back    Who called: Self     What is the request in detail: called to talk to the office in regards to reducing dosage on a medication. Would like a call back.   metoprolol succinate (TOPROL-XL) 50 MG 24 hr tablet    Can the clinic reply by [facility]? No     Would the patient rather a call back or a response via [facility]? Call back     Best call back number: [phone]     Additional Information:    Thank you.

## 2024-01-09 NOTE — TELEPHONE ENCOUNTER
Patient requesting refill. Patient also wants to know if you can lower the dose due to him feeling tired when he takes medication. jules

## 2024-01-30 DIAGNOSIS — Z00.00 ENCOUNTER FOR MEDICARE ANNUAL WELLNESS EXAM: ICD-10-CM

## 2024-02-13 NOTE — PROGRESS NOTES
This note was created by combination of typed  and M-Modal dictation.  Transcription errors may be present.  If there are any questions, please contact me.    Assessment and Plan:   Assessment and Plan   Essential hypertension  -BP good, checks rigorously, home readings < 130/90, not taking hydralazine. Stay on current regimen.     Dyslipidemia  Atherosclerosis of aorta  -check future labs on statin.     Fatigue, unspecified type  -fatigue improved. On same dose of metoprolol. No witnessed apnea, no snoring.  No changes.  Hold on HST.    Senile cataract, unspecified age-related cataract type, unspecified laterality  -he will inquire with his optometrist about recommendations.  If he needs a [facility] he will contact me.    Medications Discontinued During This Encounter   Medication Reason    hydrALAZINE (APRESOLINE) 25 MG tablet Therapy completed       meds sent this encounter:         Follow Up:  Keep appointment in June with pre visit labs  Future Appointments   Date Time Provider Department Center   6/13/2024  8:00 AM LAB, [facility] [place]   6/20/2024  1:40 PM Luis Miguel Buckley MD [facility] [place]          Subjective:   Subjective   Chief Complaint   Patient presents with    Hypertension       WALKER Velasco is a 73 y.o. male.     Social History     Tobacco Use    Smoking status: Never    Smokeless tobacco: Never   Substance Use Topics    Alcohol use: Not Currently     Comment: socially      Social History     Occupational History    Occupation: retired x 2017      Social History     Social History Narrative    Not on file       Last appointment with this clinic was 11/3/2023. Last visit with me 11/3/2023   To summarize last visit and events leading up to today:  Incidental aortic atherosclerosis.  Started on atorvastatin   Palpitations with evaluation showing PACs.  On metoprolol.  Followed by Cardiology.  Most recently seen 06/02/2023, stable, follow-up 1  year  06/03/2021 TTE LV normal size with concentric hypertrophy and normal systolic function LVEF 55%.  Normal diastolic function.  Normal RV size and systolic function.  Intermediate CVP 8. PA pressure 35.   06/03/2021 Holter 4 beat run of VT.  PACs.  Started on Toprol XL for the PACs  07/09/2021 colonoscopy sigmoid, descending diverticulosis.  Internal hemorrhoids.  2 mm sigmoid polyp.  Path not included.  Gout? christmastime 12/2022; check future labs. Work on diet.      Hospitalization 10/26 through 10/27 initial presentation of headache.  High blood pressure.  Discharged on amlodipine 10, hydralazine 25 t.i.d., aspirin  MRI brain negative   CT head negative   CTA head and neck negative   A1c 5.3  Lipid was good    At OV 11/3, fatigue due to metoprolol? Plan was follow up with cards to dsicuss its dose. We discussed possible occult JONI. Stay on ASA    Has not seen cards.    Labs due 6/2024      Today's visit:  Here with his wife.    Brings in log of BPs  All good < 130/90  Not taking hydralazine at all.      Notes that the fatigue has improved.  Thinks due to waiting too long to eat and with a better spacing of meals, feels better.   Wife notes he does not snore.    Still taking metoprolol at same dose.      No chest pain no dyspnea  No diplopia  No unilateral weakness  No headaches.    Saw optometry, referred for cataract evaluation. Dr. Jeronimo had recommended ophthalmologist.  I do not have a specific ophthalmologist to recommend.  Offered him referral to [facility] but he would like to talk with his optometrist about any preferred doctors.    Patient Care Team:  Luis Miguel Buckley MD as PCP - General (Internal Medicine)  Angela Triplett MA as Care Coordinator  Preston Mercer MD (Inactive) as Consulting Physician (Gastroenterology)    Patient Active Problem List    Diagnosis Date Noted    Essential hypertension 10/26/2023     10/26/2023 TTE normal systolic function LVEF 60-65%.  Grade 1  "diastolic dysfunction.      Dyslipidemia 12/21/2022    Palpitations, holter PACs; toprol 05/19/2021 06/03/2021 TTE LV normal size with concentric hypertrophy and normal systolic function LVEF 55%.  Normal diastolic function.  Normal RV size and systolic function.  Intermediate CVP 8. PA pressure 35.   06/03/2021 Holter 4 beat run of VT.  PACs.  Started on Toprol XL for the PACs  10/26/2023 TTE normal systolic function LVEF 60-65%.  Grade 1 diastolic dysfunction.      Atherosclerosis of aorta 10/20/2020     " Atherosclerotic calcification is noted of the aorta and its branches" - CT Abdomen Pelvis Without Contrast 2-7-2020      Nodule of upper lobe of right lung 2 mm incidental on CT 2/2020; nonsmoker. no further testing. 02/10/2020     2/2020 CT abd/pelvis: 2. 2 mm pulmonary nodule within the right upper lobe versus atelectasis.  For a solid nodule <6 mm, Fleischner Society 2017 guidelines recommend no routine follow up for a low risk patient, or follow-up with non-contrast chest CT at 12 months in a high risk patient.      Renal parapelvic cyst giving the appearance of hydronephrosis, not truly hydronephrosis.  no further testing/monitoring 02/10/2020     1/29/2020 renal US: Mild left hydronephrosis for which clinical and historical correlation is needed. Bilateral ureteral jets are noted.  2/7/2020 CT abd/pelvis without: 1. Moderate left hydronephrosis without hydroureter.  The appearance is nonspecific however may reflect that of chronic UPJ obstruction.  No findings to suggest obstructive uropathy or nephrolithiasis.   6/4/21 CT urogram: No evidence of hydronephrosis.  Appearance on previous CT and ultrasound was related to parapelvic cysts evident on current exam.  Colonic diverticula without evidence of diverticulitis  Small fat containing umbilical hernia.      History of colonic polyps 07/09/2021 colonoscopy sigmoid, descending diverticulosis.  Internal hemorrhoids.  2 mm sigmoid polyp.  Path not " "included 10/24/2018     adenoma 2013  07/09/2021 colonoscopy sigmoid, descending diverticulosis.  Internal hemorrhoids.  2 mm sigmoid polyp.  Path not included      Strabismus 10/24/2018    Rotator cuff tendonitis, left 10/24/2018    Left shoulder pain 09/13/2017       PAST MEDICAL PROBLEMS, PAST SURGICAL HISTORY: please see relevant portions of the electronic medical record    ALLERGIES AND MEDICATIONS: updated and reviewed.  Medication List with Changes/Refills   Current Medications    AMLODIPINE (NORVASC) 10 MG TABLET    Take 1 tablet (10 mg total) by mouth once daily.    ASPIRIN (ECOTRIN) 81 MG EC TABLET    Take 1 tablet (81 mg total) by mouth once daily.    ATORVASTATIN (LIPITOR) 20 MG TABLET    Take 1 tablet (20 mg total) by mouth once daily.    HYDRALAZINE (APRESOLINE) 25 MG TABLET    Take 1 tablet (25 mg total) by mouth 3 (three) times daily as needed (for BP > 140). For BP > 140    METOPROLOL SUCCINATE (TOPROL-XL) 50 MG 24 HR TABLET    Take 1 tablet by mouth once daily         Objective:   Objective   Physical Exam   Vitals:    02/14/24 1505   BP: 118/64   Pulse: 96   Temp: 97.6 °F (36.4 °C)   SpO2: 96%   Weight: 97.1 kg (214 lb 2.8 oz)   Height: 5' 11" (1.803 m)    Body mass index is 29.87 kg/m².  Weight: 97.1 kg (214 lb 2.8 oz)   Height: 5' 11" (180.3 cm)     Physical Exam  Constitutional:       General: He is not in acute distress.     Appearance: He is well-developed.   Eyes:      Extraocular Movements: Extraocular movements intact.   Cardiovascular:      Rate and Rhythm: Normal rate and regular rhythm.      Heart sounds: Normal heart sounds. No murmur heard.  Pulmonary:      Effort: Pulmonary effort is normal.      Breath sounds: Normal breath sounds.   Musculoskeletal:         General: Normal range of motion.      Right lower leg: No edema.      Left lower leg: No edema.   Lymphadenopathy:      Cervical: No cervical adenopathy.   Skin:     General: Skin is warm and dry.   Neurological:      Mental " Status: He is alert and oriented to person, place, and time.      Coordination: Coordination normal.   Psychiatric:         Behavior: Behavior normal.         Thought Content: Thought content normal.

## 2024-02-14 ENCOUNTER — OFFICE VISIT (OUTPATIENT)
Dept: FAMILY MEDICINE | Facility: CLINIC | Age: 74
End: 2024-02-14
Payer: MEDICARE

## 2024-02-14 VITALS
HEIGHT: 71 IN | DIASTOLIC BLOOD PRESSURE: 64 MMHG | WEIGHT: 214.19 LBS | SYSTOLIC BLOOD PRESSURE: 118 MMHG | OXYGEN SATURATION: 96 % | TEMPERATURE: 98 F | BODY MASS INDEX: 29.98 KG/M2 | HEART RATE: 96 BPM

## 2024-02-14 DIAGNOSIS — I70.0 ATHEROSCLEROSIS OF AORTA: ICD-10-CM

## 2024-02-14 DIAGNOSIS — R53.83 FATIGUE, UNSPECIFIED TYPE: ICD-10-CM

## 2024-02-14 DIAGNOSIS — E78.5 DYSLIPIDEMIA: ICD-10-CM

## 2024-02-14 DIAGNOSIS — I10 ESSENTIAL HYPERTENSION: Primary | ICD-10-CM

## 2024-02-14 DIAGNOSIS — H25.9 SENILE CATARACT, UNSPECIFIED AGE-RELATED CATARACT TYPE, UNSPECIFIED LATERALITY: ICD-10-CM

## 2024-02-14 PROCEDURE — 99213 OFFICE O/P EST LOW 20 MIN: CPT | Mod: S$GLB,,, | Performed by: INTERNAL MEDICINE

## 2024-02-14 PROCEDURE — 99999 PR PBB SHADOW E&M-EST. PATIENT-LVL III: CPT | Mod: PBBFAC,,, | Performed by: INTERNAL MEDICINE

## 2024-02-14 NOTE — PATIENT INSTRUCTIONS
I recommend you get the RSV vaccine.   RSV is a common respiratory virus that usually causes mild, cold-like symptoms. RSV can cause illness in people of all ages but may be especially serious for infants and older adults. Adults at highest risk for severe RSV disease include older adults, adults with chronic medical conditions such as heart or lung disease, weakened immune systems, or certain other underlying medical conditions, or who live in nursing homes or long-term care facilities. RSV spreads through direct contact with the virus, such as droplets from another persons cough or sneeze contacting your eyes, nose, or mouth. It can also be spread by touching a surface that has the virus on it, like a doorknob, and then touching your face before washing your hands. Symptoms of RSV infection may include runny nose, decrease in appetite, coughing, sneezing, fever, or wheezing. RSV can cause bronchiolitis (inflammation of the small airways in the lung) and pneumonia (infection of the lungs). RSV can sometimes lead to worsening of other medical conditions such as asthma, chronic obstructive pulmonary disease (a chronic disease of the lungs that makes it hard to breathe), or congestive heart failure (when the heart cant pump enough blood and oxygen throughout the body). Older adults and infants who get very sick from RSV may need to be hospitalized. Some may even die.       And if you're open to getting a Covid booster, I recommend it.  COVID-19 vaccination can help reduce the severity of COVID-19 disease if you get sick.  COVID-19 is caused by a coronavirus called SARS-CoV-2 that spreads easily from person to person. COVID-19 can cause mild to moderate illness lasting only a few days, or severe illness requiring hospitalization, intensive care, or a ventilator to help with breathing. COVID-19 can result in death.

## 2024-04-02 ENCOUNTER — OFFICE VISIT (OUTPATIENT)
Dept: CARDIOLOGY | Facility: CLINIC | Age: 74
End: 2024-04-02
Payer: MEDICARE

## 2024-04-02 VITALS
OXYGEN SATURATION: 96 % | DIASTOLIC BLOOD PRESSURE: 76 MMHG | HEART RATE: 87 BPM | WEIGHT: 208 LBS | HEIGHT: 71 IN | SYSTOLIC BLOOD PRESSURE: 108 MMHG | BODY MASS INDEX: 29.12 KG/M2

## 2024-04-02 DIAGNOSIS — E78.5 DYSLIPIDEMIA: ICD-10-CM

## 2024-04-02 DIAGNOSIS — R00.2 PALPITATIONS: ICD-10-CM

## 2024-04-02 DIAGNOSIS — I10 ESSENTIAL HYPERTENSION: ICD-10-CM

## 2024-04-02 DIAGNOSIS — I70.0 ATHEROSCLEROSIS OF AORTA: Primary | ICD-10-CM

## 2024-04-02 PROCEDURE — 1101F PT FALLS ASSESS-DOCD LE1/YR: CPT | Mod: CPTII,S$GLB,, | Performed by: INTERNAL MEDICINE

## 2024-04-02 PROCEDURE — 1159F MED LIST DOCD IN RCRD: CPT | Mod: CPTII,S$GLB,, | Performed by: INTERNAL MEDICINE

## 2024-04-02 PROCEDURE — 1124F ACP DISCUSS-NO DSCNMKR DOCD: CPT | Mod: CPTII,S$GLB,, | Performed by: INTERNAL MEDICINE

## 2024-04-02 PROCEDURE — 3074F SYST BP LT 130 MM HG: CPT | Mod: CPTII,S$GLB,, | Performed by: INTERNAL MEDICINE

## 2024-04-02 PROCEDURE — 99999 PR PBB SHADOW E&M-EST. PATIENT-LVL III: CPT | Mod: PBBFAC,,, | Performed by: INTERNAL MEDICINE

## 2024-04-02 PROCEDURE — 1126F AMNT PAIN NOTED NONE PRSNT: CPT | Mod: CPTII,S$GLB,, | Performed by: INTERNAL MEDICINE

## 2024-04-02 PROCEDURE — 3078F DIAST BP <80 MM HG: CPT | Mod: CPTII,S$GLB,, | Performed by: INTERNAL MEDICINE

## 2024-04-02 PROCEDURE — 99214 OFFICE O/P EST MOD 30 MIN: CPT | Mod: S$GLB,,, | Performed by: INTERNAL MEDICINE

## 2024-04-02 PROCEDURE — 3008F BODY MASS INDEX DOCD: CPT | Mod: CPTII,S$GLB,, | Performed by: INTERNAL MEDICINE

## 2024-04-02 PROCEDURE — 3288F FALL RISK ASSESSMENT DOCD: CPT | Mod: CPTII,S$GLB,, | Performed by: INTERNAL MEDICINE

## 2024-04-02 NOTE — PROGRESS NOTES
Subjective   Patient ID:  Rod Victor is a 73 y.o. male who presents for follow-up of Medication Problem      HPI    HTN, HLD    Referred by Dr Buckley  ER 5/14 for LE edema.  Troponin negative. BNP WNL.   EKG WNL. CXR no acute process.  CBC, CMP WNL. UA normal     He thinks that the edema was due to salt intake.  Negative workup as above.     Having some left-sided low back pain.  It is a cramping sensation.  After standing for periods of time.  He puts cold packs on it and it does seem to help.  Lays on a heat pad in the mornings as well.  He had previously complained about this.  Felt to be musculoskeletal but the workup for this to rule out intra-abdominal pathology showed incidental hydronephrosis for which he has undergone workup with Urology.  At last urology visit, plan was to round out the workup with a CT urogram.  And then coronavirus came and so he never got that done.     He does not do any sort specific stretches for his back in general.     Went for colonoscopy and was told he had arrhythmia.  Was at [facility]  Esme Mercer.  It is unclear what the arrhythmia was.  He was told he needed clearance in regards to the arrhythmia.  Denies symptomatic palpitations, jitteriness, lightheadedness, chest pain, shortness of breath.      EKG 5/14/21 NSR - ok     Echo 10/26/23      Left Ventricle: There is normal systolic function with a visually estimated ejection fraction of 60 - 65%. Grade I diastolic dysfunction.    Left Atrium: Left atrium is moderately dilated.    Right Ventricle: Normal right ventricular cavity size. Systolic function is normal.    Mitral Valve: There is mild regurgitation.    Pulmonary Artery: The estimated pulmonary artery systolic pressure is 39 mmHg.    IVC/SVC: Normal venous pressure at 3 mmHg.    Bubble study negative        Holter 6/3/21  Sinus rhythm with heart rates varying between 56 and 138 bpm with an average of 85 bpm  There were very rare PVC  Four beat run of VT.  There were  very frequent PACs (6826)  Short runs of atrial tachycardia. Longest 13 beats.     5/19/21 Denies prior cardiac Hx. Mild GARCIA. Denies CP  BP controlled  EKG NSR frequent PACs and blocked PACs    Echo and holter for frequent PACs and GARCIA     6/10/21 Denies CP or SOB. Occasional palpitations    Referred by Dr Buckley   Add toprol XL 25 qd for frequent PACs  Cleared for colonoscopy  OV 6 months     12/15/21 Denies CP or SOB. BP/HR mildly elevated  EKG NSR NSSTT changes  Increase toprol XL for BP and palpitations  Continue Rx for HTN, atherosclerosis, HTN  OV 6 months     6/22/22 Denies CP, mild GARCIA. Denies palpitations  EKG NSR NSSTT changes   Continue Rx for HTN, atherosclerosis, HLD, palpitations  OV 1 year     6/2/23 Denies CP or SOB. Exercises regularly  EKG NSR LVH  BP controlled   Continue Rx for HTN, atherosclerosis, HLD, palpitations  OV 1 year     Admitted 10/26/23  73 year old male with past medical history of hypertension, dyslipidemia, palpitations, L strabismus admitted to observation on 10/26/2023 for further evaluation of HTN urgency and blurred vision. He has been compliant with his medicatoins. Admit he ate a poboy for his birthday which may have been high in sodium. Labs unremarkable. CT head with no CT evidence of acute intracranial abnormality. Findings suggestive of chronic microvascular ischemic change. MRI brain w/No evidence of acute intracranial pathology.  Neurology consulted-recommends CTA head and neck. Okay for normotension. CTA head and neck showed No large vessel occlusion in the intracranial circulation. . Echo showed estimated ejection fraction of 60 - 65%. Grade I diastolic dysfunction.Bubble study negative . okay for discharge per neurology. Resumed home metoprolol. Initiated pt on norvasc and PO hydralazine with improvement in his blood pressure. Pt denies any fever, headaches, vision changes, chest pain, shortness of breath, palpitations, abdominal pain, nausea, vomiting, or any new  weaknesses. Feels ready to go home. Patient's exam on discharge was as follow: Patient is alert and oriented, appears in no acute distress, heart with regular rate and rhythm, lungs clear to asculation with non-labored breathing, abdomen soft, and no new weaknesses or focal deficits seen. Bilateral lower extremities without any edema or calf tenderness.      4/2/24 Denies CP or SOB  BP well controlled by home readings    Review of Systems   Constitutional: Negative for decreased appetite.   HENT:  Negative for ear discharge.    Eyes:  Negative for blurred vision.   Endocrine: Negative for polyphagia.   Skin:  Negative for nail changes.   Genitourinary:  Negative for bladder incontinence.   Neurological:  Negative for aphonia.   Psychiatric/Behavioral:  Negative for hallucinations.    Allergic/Immunologic: Negative for hives.          Objective     Physical Exam  Constitutional:       Appearance: He is well-developed.   HENT:      Head: Normocephalic and atraumatic.   Eyes:      Conjunctiva/sclera: Conjunctivae normal.      Pupils: Pupils are equal, round, and reactive to light.   Cardiovascular:      Rate and Rhythm: Normal rate.      Pulses: Intact distal pulses.      Heart sounds: Normal heart sounds.   Pulmonary:      Effort: Pulmonary effort is normal.      Breath sounds: Normal breath sounds.   Abdominal:      General: Bowel sounds are normal.      Palpations: Abdomen is soft.   Musculoskeletal:         General: Normal range of motion.      Cervical back: Normal range of motion and neck supple.   Skin:     General: Skin is warm and dry.   Neurological:      Mental Status: He is alert and oriented to person, place, and time.            Assessment and Plan     1. Atherosclerosis of aorta    2. Palpitations, holter PACs; toprol    3. Dyslipidemia    4. Essential hypertension        Plan:      Continue Rx for HTN, atherosclerosis, HLD, palpitations  OV 1 year    Advance Care Planning     Date: 04/02/2024  Patient  did not wish or was not able to name a surrogate decision maker or provide an Advance Care Plan.

## 2024-06-13 ENCOUNTER — LAB VISIT (OUTPATIENT)
Dept: LAB | Facility: HOSPITAL | Age: 74
End: 2024-06-13
Attending: INTERNAL MEDICINE
Payer: MEDICARE

## 2024-06-13 DIAGNOSIS — E66.09 CLASS 1 OBESITY DUE TO EXCESS CALORIES WITHOUT SERIOUS COMORBIDITY WITH BODY MASS INDEX (BMI) OF 30.0 TO 30.9 IN ADULT: ICD-10-CM

## 2024-06-13 DIAGNOSIS — R79.9 ABNORMAL FINDING OF BLOOD CHEMISTRY: ICD-10-CM

## 2024-06-13 DIAGNOSIS — Z00.00 NORMAL PHYSICAL EXAM: ICD-10-CM

## 2024-06-13 DIAGNOSIS — M10.9 PODAGRA: ICD-10-CM

## 2024-06-13 DIAGNOSIS — Z12.5 SCREENING FOR PROSTATE CANCER: ICD-10-CM

## 2024-06-13 DIAGNOSIS — E78.5 DYSLIPIDEMIA: ICD-10-CM

## 2024-06-13 LAB
ALBUMIN SERPL BCP-MCNC: 3.9 G/DL (ref 3.5–5.2)
ALP SERPL-CCNC: 90 U/L (ref 55–135)
ALT SERPL W/O P-5'-P-CCNC: 23 U/L (ref 10–44)
ANION GAP SERPL CALC-SCNC: 10 MMOL/L (ref 8–16)
AST SERPL-CCNC: 22 U/L (ref 10–40)
BILIRUB SERPL-MCNC: 0.8 MG/DL (ref 0.1–1)
BUN SERPL-MCNC: 19 MG/DL (ref 8–23)
CALCIUM SERPL-MCNC: 9.8 MG/DL (ref 8.7–10.5)
CHLORIDE SERPL-SCNC: 108 MMOL/L (ref 95–110)
CHOLEST SERPL-MCNC: 120 MG/DL (ref 120–199)
CHOLEST/HDLC SERPL: 2.4 {RATIO} (ref 2–5)
CO2 SERPL-SCNC: 23 MMOL/L (ref 23–29)
COMPLEXED PSA SERPL-MCNC: 0.83 NG/ML (ref 0–4)
CREAT SERPL-MCNC: 1.1 MG/DL (ref 0.5–1.4)
ERYTHROCYTE [DISTWIDTH] IN BLOOD BY AUTOMATED COUNT: 12.7 % (ref 11.5–14.5)
EST. GFR  (NO RACE VARIABLE): >60 ML/MIN/1.73 M^2
ESTIMATED AVG GLUCOSE: 111 MG/DL (ref 68–131)
GLUCOSE SERPL-MCNC: 92 MG/DL (ref 70–110)
HBA1C MFR BLD: 5.5 % (ref 4–5.6)
HCT VFR BLD AUTO: 41.2 % (ref 40–54)
HDLC SERPL-MCNC: 50 MG/DL (ref 40–75)
HDLC SERPL: 41.7 % (ref 20–50)
HGB BLD-MCNC: 13.9 G/DL (ref 14–18)
LDLC SERPL CALC-MCNC: 62.4 MG/DL (ref 63–159)
MCH RBC QN AUTO: 33.3 PG (ref 27–31)
MCHC RBC AUTO-ENTMCNC: 33.7 G/DL (ref 32–36)
MCV RBC AUTO: 99 FL (ref 82–98)
NONHDLC SERPL-MCNC: 70 MG/DL
PLATELET # BLD AUTO: 225 K/UL (ref 150–450)
PMV BLD AUTO: 11.5 FL (ref 9.2–12.9)
POTASSIUM SERPL-SCNC: 4.3 MMOL/L (ref 3.5–5.1)
PROT SERPL-MCNC: 7.1 G/DL (ref 6–8.4)
RBC # BLD AUTO: 4.17 M/UL (ref 4.6–6.2)
SODIUM SERPL-SCNC: 141 MMOL/L (ref 136–145)
TRIGL SERPL-MCNC: 38 MG/DL (ref 30–150)
URATE SERPL-MCNC: 7.6 MG/DL (ref 3.4–7)
WBC # BLD AUTO: 6.77 K/UL (ref 3.9–12.7)

## 2024-06-13 PROCEDURE — 84153 ASSAY OF PSA TOTAL: CPT | Performed by: INTERNAL MEDICINE

## 2024-06-13 PROCEDURE — 85027 COMPLETE CBC AUTOMATED: CPT | Performed by: INTERNAL MEDICINE

## 2024-06-13 PROCEDURE — 36415 COLL VENOUS BLD VENIPUNCTURE: CPT | Mod: PO | Performed by: INTERNAL MEDICINE

## 2024-06-13 PROCEDURE — 80053 COMPREHEN METABOLIC PANEL: CPT | Performed by: INTERNAL MEDICINE

## 2024-06-13 PROCEDURE — 84550 ASSAY OF BLOOD/URIC ACID: CPT | Performed by: INTERNAL MEDICINE

## 2024-06-13 PROCEDURE — 80061 LIPID PANEL: CPT | Performed by: INTERNAL MEDICINE

## 2024-06-13 PROCEDURE — 83036 HEMOGLOBIN GLYCOSYLATED A1C: CPT | Performed by: INTERNAL MEDICINE

## 2024-06-19 NOTE — PROGRESS NOTES
This note was created by combination of typed  and M-Modal dictation.  Transcription errors may be present.  If there are any questions, please contact me.    Assessment and Plan:   Assessment and Plan   Normal physical exam  Screening for prostate cancer  History of colonic polyps 07/09/2021 colonoscopy sigmoid, descending diverticulosis.  Internal hemorrhoids.  2 mm sigmoid polyp.  Path not included  -pre visit labs reviewed  Plan for repeat colonoscopy 2026  PSA done pre visit, check again next year and discuss about stopping  -     CBC Without Differential; Future; Expected date: 06/19/2025  -     Comprehensive Metabolic Panel; Future; Expected date: 06/19/2025  -     Lipid Panel; Future; Expected date: 06/19/2025  -     PSA, Screening; Future; Expected date: 06/20/2025    Essential hypertension  Palpitations, holter PACs; toprol  -BP stable.  Home readings are good.  No palpitations sensation.  Stable on regimen refilled amlodipine and metoprolol  -     amLODIPine (NORVASC) 10 MG tablet; Take 1 tablet (10 mg total) by mouth once daily.  Dispense: 90 tablet; Refill: 3  -     metoprolol succinate (TOPROL-XL) 50 MG 24 hr tablet; Take 1 tablet (50 mg total) by mouth once daily.  Dispense: 90 tablet; Refill: 3    Dyslipidemia  Atherosclerosis of aorta  -pre visit labs good, no changes updated prescription for Lipitor  -     CBC Without Differential; Future; Expected date: 06/19/2025  -     Comprehensive Metabolic Panel; Future; Expected date: 06/19/2025  -     Lipid Panel; Future; Expected date: 06/19/2025  -     atorvastatin (LIPITOR) 20 MG tablet; Take 1 tablet (20 mg total) by mouth once daily.  Dispense: 90 tablet; Refill: 3      Podagra  -no flares after initial foot pain.  Uric acid high.  Recommended gout diet.  Hold on prophylaxis.  Check future labs  -     Uric Acid; Future; Expected date: 06/20/2025          Medications Discontinued During This Encounter   Medication Reason    aspirin (ECOTRIN)  81 MG EC tablet Therapy completed    atorvastatin (LIPITOR) 20 MG tablet Reorder    amLODIPine (NORVASC) 10 MG tablet Reorder    metoprolol succinate (TOPROL-XL) 50 MG 24 hr tablet Reorder       meds sent this encounter:  Medications Ordered This Encounter   Medications    amLODIPine (NORVASC) 10 MG tablet     Sig: Take 1 tablet (10 mg total) by mouth once daily.     Dispense:  90 tablet     Refill:  3     Pharmacy update refills, keep on file, not requesting Rx to be filled today.    atorvastatin (LIPITOR) 20 MG tablet     Sig: Take 1 tablet (20 mg total) by mouth once daily.     Dispense:  90 tablet     Refill:  3     Pharmacy update refills, keep on file, not requesting Rx to be filled today.    metoprolol succinate (TOPROL-XL) 50 MG 24 hr tablet     Sig: Take 1 tablet (50 mg total) by mouth once daily.     Dispense:  90 tablet     Refill:  3     .Pharmacy update refills, keep on file, not requesting Rx to be filled today.         Follow Up: Physical examination 1 year.   Future Appointments   Date Time Provider Department Center   6/27/2024  2:30 PM Obdulio Olivas, NP [facility]   6/13/2025  8:00 AM [facility]          Subjective:   Subjective   Chief Complaint   Patient presents with    Annual Exam       HPI  Rod is a 73 y.o. male.     Social History     Tobacco Use    Smoking status: Never    Smokeless tobacco: Never   Substance Use Topics    Alcohol use: Not Currently     Comment: socially      Social History     Occupational History    Occupation: retired x 2017      Social History     Social History Narrative    Not on file       Last appointment with this clinic was 2/14/2024. Last visit with me 2/14/2024   To summarize last visit and events leading up to today:  Incidental aortic atherosclerosis.  Started on atorvastatin   Palpitations with evaluation showing PACs.  On metoprolol.  Followed by Cardiology.  Most recently seen 06/02/2023, stable, follow-up 1  year  06/03/2021 TTE LV normal size with concentric hypertrophy and normal systolic function LVEF 55%.  Normal diastolic function.  Normal RV size and systolic function.  Intermediate CVP 8. PA pressure 35.   06/03/2021 Holter 4 beat run of VT.  PACs.  Started on Toprol XL for the PACs  07/09/2021 colonoscopy sigmoid, descending diverticulosis.  Internal hemorrhoids.  2 mm sigmoid polyp.  Path not included.  Gout? christmastime 12/2022; check future labs. Work on diet.      Hospitalization 10/26 through 10/27 initial presentation of headache.  High blood pressure.  Discharged on amlodipine 10, hydralazine 25 t.i.d., aspirin  MRI brain negative   CT head negative   CTA head and neck negative   A1c 5.3  Lipid was good     At OV 11/3, fatigue due to metoprolol? Plan was follow up with cards to dsicuss its dose. We discussed possible occult JONI. Stay on ASA     Has not seen cards.     Labs due 6/2024    Last visit 02/14/2024   Hypertension stable not taking hydralazine.  Stable  Fatigue improved hold on home sleep test    Saw cardiology in follow-up 04/02/2024.  Stable follow-up 1 year    Pre visit labs   CBC mild anemia compared to previous with mild macrocytosis   CMP normal   Uric acid high 7.6 no previous for comparison  Lipid profile good   A1c 5.5   PSA normal      Today's visit:  Here with wife  She's going to retire but will continue pursuing her degree at [employer].      No issues with palpitations.  Taking Toprol-XL.  Denies side effects of the medication.  Home readings log - consistently < 130/80.  No issues.    Drinks juice.  For bowel regularity.  Recommended to eat the fruit instead.  Is interested in dragon fruit.      Ok to stop the ASA.    Home treadmill and bike        Patient Care Team:  Luis Miguel Buckley MD as PCP - General (Internal Medicine)  Angela Triplett MA as Care Coordinator  rPeston Mercer MD (Inactive) as Consulting Physician (Gastroenterology)    Patient Active Problem List     "Diagnosis Date Noted    Essential hypertension 10/26/2023     10/26/2023 TTE normal systolic function LVEF 60-65%.  Grade 1 diastolic dysfunction.      Dyslipidemia 12/21/2022    Palpitations, holter PACs; toprol 05/19/2021 06/03/2021 TTE LV normal size with concentric hypertrophy and normal systolic function LVEF 55%.  Normal diastolic function.  Normal RV size and systolic function.  Intermediate CVP 8. PA pressure 35.   06/03/2021 Holter 4 beat run of VT.  PACs.  Started on Toprol XL for the PACs  10/26/2023 TTE normal systolic function LVEF 60-65%.  Grade 1 diastolic dysfunction.      Atherosclerosis of aorta 10/20/2020     " Atherosclerotic calcification is noted of the aorta and its branches" - CT Abdomen Pelvis Without Contrast 2-7-2020      Nodule of upper lobe of right lung 2 mm incidental on CT 2/2020; nonsmoker. no further testing. 02/10/2020     2/2020 CT abd/pelvis: 2. 2 mm pulmonary nodule within the right upper lobe versus atelectasis.  For a solid nodule <6 mm, Fleischner Society 2017 guidelines recommend no routine follow up for a low risk patient, or follow-up with non-contrast chest CT at 12 months in a high risk patient.      Renal parapelvic cyst giving the appearance of hydronephrosis, not truly hydronephrosis.  no further testing/monitoring 02/10/2020     1/29/2020 renal US: Mild left hydronephrosis for which clinical and historical correlation is needed. Bilateral ureteral jets are noted.  2/7/2020 CT abd/pelvis without: 1. Moderate left hydronephrosis without hydroureter.  The appearance is nonspecific however may reflect that of chronic UPJ obstruction.  No findings to suggest obstructive uropathy or nephrolithiasis.   6/4/21 CT urogram: No evidence of hydronephrosis.  Appearance on previous CT and ultrasound was related to parapelvic cysts evident on current exam.  Colonic diverticula without evidence of diverticulitis  Small fat containing umbilical hernia.      History of colonic " "polyps 07/09/2021 colonoscopy sigmoid, descending diverticulosis.  Internal hemorrhoids.  2 mm sigmoid polyp.  Path not included 10/24/2018     adenoma 2013  07/09/2021 colonoscopy sigmoid, descending diverticulosis.  Internal hemorrhoids.  2 mm sigmoid polyp.  Path not included      Strabismus 10/24/2018    Rotator cuff tendonitis, left 10/24/2018    Left shoulder pain 09/13/2017       PAST MEDICAL PROBLEMS, PAST SURGICAL HISTORY: please see relevant portions of the electronic medical record    ALLERGIES AND MEDICATIONS: updated and reviewed.  Medication List with Changes/Refills   Current Medications    AMLODIPINE (NORVASC) 10 MG TABLET    Take 1 tablet (10 mg total) by mouth once daily.    ASPIRIN (ECOTRIN) 81 MG EC TABLET    Take 1 tablet (81 mg total) by mouth once daily.    ATORVASTATIN (LIPITOR) 20 MG TABLET    Take 1 tablet (20 mg total) by mouth once daily.    METOPROLOL SUCCINATE (TOPROL-XL) 50 MG 24 HR TABLET    Take 1 tablet by mouth once daily         Objective:   Objective   Physical Exam   Vitals:    06/20/24 1322   BP: 116/60   BP Location: Left arm   Patient Position: Sitting   BP Method: Medium (Manual)   Pulse: 82   Temp: 98.5 °F (36.9 °C)   TempSrc: Oral   SpO2: 96%   Weight: 95.1 kg (209 lb 10.5 oz)   Height: 5' 11" (1.803 m)    Body mass index is 29.24 kg/m².  Weight: 95.1 kg (209 lb 10.5 oz)   Height: 5' 11" (180.3 cm)     Physical Exam  Constitutional:       Appearance: Normal appearance. He is well-developed.   HENT:      Right Ear: Tympanic membrane and external ear normal.      Left Ear: Tympanic membrane and external ear normal.      Nose: Nose normal.   Eyes:      General: No scleral icterus.     Conjunctiva/sclera: Conjunctivae normal.      Comments: Left lateral strabismus   Neck:      Thyroid: No thyroid mass or thyromegaly.      Trachea: Trachea normal.   Cardiovascular:      Rate and Rhythm: Normal rate and regular rhythm.      Heart sounds: Normal heart sounds, S1 normal and S2 " normal. No murmur heard.  Pulmonary:      Effort: Pulmonary effort is normal.      Breath sounds: Normal breath sounds.   Abdominal:      General: There is no distension.      Palpations: Abdomen is soft. There is no hepatomegaly, splenomegaly or mass.      Tenderness: There is no abdominal tenderness.   Musculoskeletal:         General: No deformity.      Right lower leg: No edema.      Left lower leg: No edema.   Lymphadenopathy:      Cervical: No cervical adenopathy.   Skin:     General: Skin is warm and dry.      Findings: No rash (on exposed skin).      Comments: On exposed skin   Neurological:      Mental Status: He is alert and oriented to person, place, and time.      Cranial Nerves: No cranial nerve deficit.      Sensory: No sensory deficit.      Deep Tendon Reflexes: Reflexes are normal and symmetric.   Psychiatric:         Speech: Speech normal.         Behavior: Behavior normal.         Thought Content: Thought content normal.         Judgment: Judgment normal.

## 2024-06-20 ENCOUNTER — OFFICE VISIT (OUTPATIENT)
Dept: FAMILY MEDICINE | Facility: CLINIC | Age: 74
End: 2024-06-20
Payer: MEDICARE

## 2024-06-20 VITALS
SYSTOLIC BLOOD PRESSURE: 116 MMHG | BODY MASS INDEX: 29.35 KG/M2 | WEIGHT: 209.69 LBS | OXYGEN SATURATION: 96 % | HEART RATE: 82 BPM | DIASTOLIC BLOOD PRESSURE: 60 MMHG | TEMPERATURE: 99 F | HEIGHT: 71 IN

## 2024-06-20 DIAGNOSIS — I70.0 ATHEROSCLEROSIS OF AORTA: ICD-10-CM

## 2024-06-20 DIAGNOSIS — Z86.010 HISTORY OF COLONIC POLYPS: ICD-10-CM

## 2024-06-20 DIAGNOSIS — E78.5 DYSLIPIDEMIA: ICD-10-CM

## 2024-06-20 DIAGNOSIS — M10.9 PODAGRA: ICD-10-CM

## 2024-06-20 DIAGNOSIS — R00.2 PALPITATIONS: ICD-10-CM

## 2024-06-20 DIAGNOSIS — Z12.5 SCREENING FOR PROSTATE CANCER: ICD-10-CM

## 2024-06-20 DIAGNOSIS — Z00.00 NORMAL PHYSICAL EXAM: Primary | ICD-10-CM

## 2024-06-20 DIAGNOSIS — I10 ESSENTIAL HYPERTENSION: ICD-10-CM

## 2024-06-20 PROCEDURE — 99397 PER PM REEVAL EST PAT 65+ YR: CPT | Mod: S$GLB,,, | Performed by: INTERNAL MEDICINE

## 2024-06-20 PROCEDURE — 3074F SYST BP LT 130 MM HG: CPT | Mod: CPTII,S$GLB,, | Performed by: INTERNAL MEDICINE

## 2024-06-20 PROCEDURE — 99999 PR PBB SHADOW E&M-EST. PATIENT-LVL III: CPT | Mod: PBBFAC,,, | Performed by: INTERNAL MEDICINE

## 2024-06-20 PROCEDURE — 1160F RVW MEDS BY RX/DR IN RCRD: CPT | Mod: CPTII,S$GLB,, | Performed by: INTERNAL MEDICINE

## 2024-06-20 PROCEDURE — 1159F MED LIST DOCD IN RCRD: CPT | Mod: CPTII,S$GLB,, | Performed by: INTERNAL MEDICINE

## 2024-06-20 PROCEDURE — 1126F AMNT PAIN NOTED NONE PRSNT: CPT | Mod: CPTII,S$GLB,, | Performed by: INTERNAL MEDICINE

## 2024-06-20 PROCEDURE — 3078F DIAST BP <80 MM HG: CPT | Mod: CPTII,S$GLB,, | Performed by: INTERNAL MEDICINE

## 2024-06-20 PROCEDURE — 3044F HG A1C LEVEL LT 7.0%: CPT | Mod: CPTII,S$GLB,, | Performed by: INTERNAL MEDICINE

## 2024-06-20 PROCEDURE — 1101F PT FALLS ASSESS-DOCD LE1/YR: CPT | Mod: CPTII,S$GLB,, | Performed by: INTERNAL MEDICINE

## 2024-06-20 PROCEDURE — 3288F FALL RISK ASSESSMENT DOCD: CPT | Mod: CPTII,S$GLB,, | Performed by: INTERNAL MEDICINE

## 2024-06-20 PROCEDURE — 3008F BODY MASS INDEX DOCD: CPT | Mod: CPTII,S$GLB,, | Performed by: INTERNAL MEDICINE

## 2024-06-20 RX ORDER — AMLODIPINE BESYLATE 10 MG/1
10 TABLET ORAL DAILY
Qty: 90 TABLET | Refills: 3 | Status: SHIPPED | OUTPATIENT
Start: 2024-06-20

## 2024-06-20 RX ORDER — METOPROLOL SUCCINATE 50 MG/1
50 TABLET, EXTENDED RELEASE ORAL DAILY
Qty: 90 TABLET | Refills: 3 | Status: SHIPPED | OUTPATIENT
Start: 2024-06-20

## 2024-06-20 RX ORDER — ATORVASTATIN CALCIUM 20 MG/1
20 TABLET, FILM COATED ORAL DAILY
Qty: 90 TABLET | Refills: 3 | Status: SHIPPED | OUTPATIENT
Start: 2024-06-20

## 2024-06-27 ENCOUNTER — OFFICE VISIT (OUTPATIENT)
Dept: FAMILY MEDICINE | Facility: CLINIC | Age: 74
End: 2024-06-27
Payer: MEDICARE

## 2024-06-27 VITALS
HEIGHT: 71 IN | SYSTOLIC BLOOD PRESSURE: 100 MMHG | TEMPERATURE: 98 F | OXYGEN SATURATION: 95 % | BODY MASS INDEX: 29.2 KG/M2 | HEART RATE: 76 BPM | WEIGHT: 208.56 LBS | DIASTOLIC BLOOD PRESSURE: 60 MMHG

## 2024-06-27 DIAGNOSIS — I10 ESSENTIAL HYPERTENSION: ICD-10-CM

## 2024-06-27 DIAGNOSIS — I70.0 ATHEROSCLEROSIS OF AORTA: ICD-10-CM

## 2024-06-27 DIAGNOSIS — Z00.00 ENCOUNTER FOR PREVENTIVE HEALTH EXAMINATION: ICD-10-CM

## 2024-06-27 DIAGNOSIS — Z00.00 ENCOUNTER FOR MEDICARE ANNUAL WELLNESS EXAM: Primary | ICD-10-CM

## 2024-06-27 DIAGNOSIS — E78.5 DYSLIPIDEMIA: ICD-10-CM

## 2024-06-27 DIAGNOSIS — Z71.89 ADVANCED DIRECTIVES, COUNSELING/DISCUSSION: ICD-10-CM

## 2024-06-27 PROCEDURE — 99999 PR PBB SHADOW E&M-EST. PATIENT-LVL IV: CPT | Mod: PBBFAC,,, | Performed by: NURSE PRACTITIONER

## 2024-06-27 NOTE — PROGRESS NOTES
"  HPI     Chief Complaint:  AWV    Rod Victor is a 73 y.o. male with multiple medical diagnoses as listed in the medical history and problem list that presented for a Medicare AWV and comprehensive Health Risk Assessment today.      The following components were reviewed and updated:    Medical history  Family History  Social history  Allergies and Current Medications  Health Risk Assessment  Health Maintenance  Care Team     HPI      Assessment & Plan       Diagnoses and health risks identified today and associated recommendations/orders:    Problem List Items Addressed This Visit          Cardiac/Vascular    Atherosclerosis of aorta    -assessed and addressed all modifiable risk factors.  Continue with appropriate management to prevent complications with regards to lipid and BP monitoring.      Overview     " Atherosclerotic calcification is noted of the aorta and its branches" - CT Abdomen Pelvis Without Contrast 2-7-2020         Dyslipidemia    discussed ways to lower triglycerides such as cutting simple sugars out of diet (white breads, candies, cookies, cakes, etc.) and reducing/eliminating intake of highly processed trans fatty acids.   Exercise 30 minutes a day for 4-5 days a week.   Eat more fiber.      Essential hypertension    BP Readings from Last 3 Encounters:   06/27/24 100/60   06/20/24 116/60   04/02/24 108/76       -continue current medication regimen  -DASH diet, regular cardiovascular exercises, portion control  -weight loss  -f/u with BP logs in 2 weeks       Overview     10/26/2023 TTE normal systolic function LVEF 60-65%.  Grade 1 diastolic dysfunction.          Other Visit Diagnoses       Encounter for Medicare annual wellness exam    -  Primary    Counseled on age appropriate medical preventative services including age appropriate cancer screenings, age appropriate eye and dental exams, over all nutritional health, need for a consistent exercise regimen, and an over all push towards " maintaining a vigorous and active lifestyle.  Counseled on age appropriate vaccines and discussed upcoming health care needs based on age/gender. Discussed good sleep hygiene and stress management.      Advanced directives, counseling/discussion        I offered to discuss advanced care planning, including how to pick a person who would make decisions for you if you were unable to make them for yourself, called a health care power of , and what kind of decisions you might make such as use of life sustaining treatments such as ventilators and tube feeding when faced with a life limiting illness recorded on a living will that they will need to know. (How you want to be cared for as you near the end of your natural life)     X Patient is interested in learning more about how to make advanced directives.  I provided them paperwork and offered to discuss this with them.      Encounter for preventive health examination        As above.              --------------------------------------------    ** See Completed Assessments for Annual Wellness Visit within the encounter summary.**    The following assessments were completed:  Living Situation  CAGE  Depression Screening  Timed Get Up and Go  Whisper Test  Cognitive Function Screening  Nutrition Screening  ADL Screening  PAQ Screening      Provided Rod Victor  with a 5-10 year written screening schedule and personal prevention plan. Recommendations were developed using the USPSTF age appropriate recommendations. Education, counseling, and referrals were provided as needed. After Visit Summary printed and given to patient which includes a list of additional screenings\tests needed.    Review for Opioid Screening: Pt does not have Rx for Opioids     Review for Substance Use Disorders: Patient does not abuse use substances    Exam     Review of Systems:  (as noted above)  Review of Systems   Constitutional:  Negative for fever.   HENT:  Negative for trouble  "swallowing.    Eyes:  Negative for visual disturbance.   Respiratory:  Negative for chest tightness and shortness of breath.    Cardiovascular:  Negative for chest pain.   Gastrointestinal:  Negative for blood in stool.       Physical Exam:   Physical Exam  Constitutional:       General: He is not in acute distress.     Appearance: He is not ill-appearing or diaphoretic.   HENT:      Head: Normocephalic and atraumatic.   Eyes:      General: No scleral icterus.  Cardiovascular:      Rate and Rhythm: Normal rate and regular rhythm.   Pulmonary:      Effort: No respiratory distress.   Chest:      Chest wall: No tenderness.   Neurological:      General: No focal deficit present.      Mental Status: He is alert and oriented to person, place, and time.       Vitals:    06/27/24 1443   BP: 100/60   BP Location: Left arm   Patient Position: Sitting   BP Method: Medium (Manual)   Pulse: 76   Temp: 98.1 °F (36.7 °C)   TempSrc: Oral   SpO2: 95%   Weight: 94.6 kg (208 lb 8.9 oz)   Height: 5' 11" (1.803 m)      Body mass index is 29.09 kg/m².    Clock:              Health Maintenance:  Health Maintenance         Date Due Completion Date    COVID-19 Vaccine (8 - 2023-24 season) 10/17/2023 8/22/2023    High Dose Statin 06/20/2025 6/20/2024    Colorectal Cancer Screening 07/09/2026 7/9/2021    Lipid Panel 06/13/2029 6/13/2024    TETANUS VACCINE 05/18/2031 5/18/2021            Advised patient on the importance of completing overdue health maintenance items      Follow Up:  Follow up in about 3 months (around 9/27/2024).    History     Past Medical History:  Past Medical History:   Diagnosis Date    Disorder of kidney and ureter     History of colonic polyps 10/24/2018    adenoma 2013, polyp 2016 at [facility] - 5 yrs    Hypertension 10/26/2023    Rotator cuff tendonitis, left 10/24/2018    Strabismus 10/24/2018       Past Surgical History:  Past Surgical History:   Procedure Laterality Date    CYSTOSCOPY W/ RETROGRADES Bilateral " 3/11/2020    Procedure: CYSTOSCOPY, WITH RETROGRADE PYELOGRAM;  Surgeon: ANABELL Vásquez MD;  Location: [facility] OR;  Service: Urology;  Laterality: Bilateral;  RN PREOP 3/4/2020    NO PAST SURGERIES         Social History:  Social History     Socioeconomic History    Marital status:    Occupational History    Occupation: retired x 2017   Tobacco Use    Smoking status: Never    Smokeless tobacco: Never   Substance and Sexual Activity    Alcohol use: Not Currently     Comment: socially    Drug use: No    Sexual activity: Yes     Partners: Female     Social Determinants of Health     Financial Resource Strain: Low Risk  (6/27/2024)    Overall Financial Resource Strain (CARDIA)     Difficulty of Paying Living Expenses: Not hard at all   Food Insecurity: No Food Insecurity (6/27/2024)    Hunger Vital Sign     Worried About Running Out of Food in the Last Year: Never true     Ran Out of Food in the Last Year: Never true   Transportation Needs: No Transportation Needs (6/27/2024)    PRAPARE - Transportation     Lack of Transportation (Medical): No     Lack of Transportation (Non-Medical): No   Physical Activity: Insufficiently Active (6/27/2024)    Exercise Vital Sign     Days of Exercise per Week: 2 days     Minutes of Exercise per Session: 60 min   Stress: No Stress Concern Present (6/27/2024)    [place] [employer] - Occupational Stress Questionnaire     Feeling of Stress : Not at all   Housing Stability: Low Risk  (6/27/2024)    Housing Stability Vital Sign     Unable to Pay for Housing in the Last Year: No     Homeless in the Last Year: No       Family History:  No family history on file.    Allergies and Medications: (updated and reviewed)  Review of patient's allergies indicates:  No Known Allergies  Current Outpatient Medications   Medication Sig Dispense Refill    amLODIPine (NORVASC) 10 MG tablet Take 1 tablet (10 mg total) by mouth once daily. 90 tablet 3    atorvastatin (LIPITOR) 20  MG tablet Take 1 tablet (20 mg total) by mouth once daily. 90 tablet 3    metoprolol succinate (TOPROL-XL) 50 MG 24 hr tablet Take 1 tablet (50 mg total) by mouth once daily. 90 tablet 3     No current facility-administered medications for this visit.           - The patient is given an After Visit Summary that lists all medications with directions, allergies, education, orders placed during this encounter and follow-up instructions.      - I have reviewed the patient's medical information including past medical, family, and social history sections including the medications and allergies.      - We discussed the patient's current medications.     This note was created by combination of typed  and MModal dictation.  Transcription errors may be present.  If there are any questions, please contact me.       Obdulio Olivas NP

## 2024-06-27 NOTE — PATIENT INSTRUCTIONS
Counseling and Referral of Other Preventative  (Italic type indicates deductible and co-insurance are waived)    Patient Name: Rod Victor  Today's Date: 6/27/2024    Health Maintenance         Date Due Completion Date    COVID-19 Vaccine (8 - 2023-24 season) 10/17/2023 8/22/2023    High Dose Statin 06/20/2025 6/20/2024    Colorectal Cancer Screening 07/09/2026 7/9/2021    Lipid Panel 06/13/2029 6/13/2024    TETANUS VACCINE 05/18/2031 5/18/2021          No orders of the defined types were placed in this encounter.      The following information is provided to all patients.  This information is to help you find resources for any of the problems found today that may be affecting your health:                  Living healthy guide: www.[facility].[place].[facility]      Understanding Diabetes: www.diabetes.org      Eating healthy: www.cdc.gov/healthyweight      [facility] home safety checklist: www.cdc.gov/steadi/patient.html      Agency on Aging: www.goea.[place].[facility]      Alcoholics anonymous (AA): www.aa.org      Physical Activity: www.anne.nih.gov/ce2dwnp      Tobacco use: www.quitwithusla.org       Patient Education        Yearly Physical for Adults   About this topic   Most people do not want to be sick. Having a checkup each year with your doctor is one way to help you stay healthy. You may need to see your doctor more or less often. How often you need to go to the doctor depends on your age. Your family and medical history also play a role in how often you need to go to the doctor. Going to see your doctor on a routine basis can help you find problems early or even before they start. This may make it easier to treat or cure your problem.  General   Your doctor will talk about many things during your checkup. Your doctor may ask about:  Your medical and family history.  All the drugs you are taking. Be sure to include all prescription, over the counter, and herbal supplements. Tell the doctor if you have any drug allergy. Bring  a list of drugs you take with you.  How you are feeling and if you are having any problems.  Risky behaviors like smoking, drinking alcohol, using illegal drugs, not wearing seatbelts, having unprotected sex, etc.  Your doctor will do a physical exam and may check your:  Height and weight  Blood pressure  Reflexes  Memory  Vision  Hearing  Your doctor may order:  Lab tests  ECG to check your heart rhythm  X-rays  Tests or treatments based on your exam  What lifestyle changes are needed?   Your doctor may suggest you make changes to your lifestyle at this visit. The doctor may talk with you about being more active or lowering stress levels. Ask your doctor what you need to do.  What drugs may be needed?   Your doctor may order drugs or vaccines to protect you from illnesses.  What changes to diet are needed?   Talk to your doctor to see if any changes are needed to your diet.  When do I need to call the doctor?   Call your doctor if you need to learn about any test results. Together you can make a plan for more care.  Helpful tips   Make a list of questions for your doctor before you go. This will help you remember to ask about any concerns. Write down any answers from your doctor so you can look over them after your visit.   Tell your doctor about any changes in your body or health since your last visit.  Ask your doctor about any screening tests you need.  Where can I learn more?   American Academy of Family Physicians  http://familydoctor.org/familydoctor/en/prevention-wellness/staying-healthy/healthy-living/preventive-services-for-healthy-living.printerview.html   Centers for Disease Control  http://www.cdc.gov/family/checkup/   Last Reviewed Date   2019-04-22  Consumer Information Use and Disclaimer   This information is not specific medical advice and does not replace information you receive from your health care provider. This is only a brief summary of general information. It does NOT include all information  about conditions, illnesses, injuries, tests, procedures, treatments, therapies, discharge instructions or life-style choices that may apply to you. You must talk with your health care provider for complete information about your health and treatment options. This information should not be used to decide whether or not to accept your health care providers advice, instructions or recommendations. Only your health care provider has the knowledge and training to provide advice that is right for you.  Copyright   Copyright © 2021 [employer] and its affiliates and/or licensors. All rights reserved.

## 2024-12-23 ENCOUNTER — OFFICE VISIT (OUTPATIENT)
Dept: FAMILY MEDICINE | Facility: CLINIC | Age: 74
End: 2024-12-23
Payer: MEDICARE

## 2024-12-23 VITALS
SYSTOLIC BLOOD PRESSURE: 110 MMHG | HEIGHT: 71 IN | TEMPERATURE: 98 F | DIASTOLIC BLOOD PRESSURE: 70 MMHG | BODY MASS INDEX: 29.32 KG/M2 | HEART RATE: 72 BPM | WEIGHT: 209.44 LBS | OXYGEN SATURATION: 97 %

## 2024-12-23 DIAGNOSIS — E66.3 OVERWEIGHT (BMI 25.0-29.9): ICD-10-CM

## 2024-12-23 DIAGNOSIS — Z01.818 PREOPERATIVE EXAMINATION: Primary | ICD-10-CM

## 2024-12-23 DIAGNOSIS — Z96.1 HISTORY OF CATARACT REMOVAL WITH INSERTION OF PROSTHETIC LENS: ICD-10-CM

## 2024-12-23 DIAGNOSIS — E78.5 DYSLIPIDEMIA: ICD-10-CM

## 2024-12-23 DIAGNOSIS — I70.0 ATHEROSCLEROSIS OF AORTA: ICD-10-CM

## 2024-12-23 DIAGNOSIS — Z98.49 HISTORY OF CATARACT REMOVAL WITH INSERTION OF PROSTHETIC LENS: ICD-10-CM

## 2024-12-23 DIAGNOSIS — I10 ESSENTIAL HYPERTENSION: ICD-10-CM

## 2024-12-23 PROCEDURE — 99999 PR PBB SHADOW E&M-EST. PATIENT-LVL III: CPT | Mod: PBBFAC,,, | Performed by: FAMILY MEDICINE

## 2024-12-23 PROCEDURE — 1160F RVW MEDS BY RX/DR IN RCRD: CPT | Mod: CPTII,S$GLB,, | Performed by: FAMILY MEDICINE

## 2024-12-23 PROCEDURE — 1159F MED LIST DOCD IN RCRD: CPT | Mod: CPTII,S$GLB,, | Performed by: FAMILY MEDICINE

## 2024-12-23 PROCEDURE — 3078F DIAST BP <80 MM HG: CPT | Mod: CPTII,S$GLB,, | Performed by: FAMILY MEDICINE

## 2024-12-23 PROCEDURE — 3288F FALL RISK ASSESSMENT DOCD: CPT | Mod: CPTII,S$GLB,, | Performed by: FAMILY MEDICINE

## 2024-12-23 PROCEDURE — 1101F PT FALLS ASSESS-DOCD LE1/YR: CPT | Mod: CPTII,S$GLB,, | Performed by: FAMILY MEDICINE

## 2024-12-23 PROCEDURE — 3044F HG A1C LEVEL LT 7.0%: CPT | Mod: CPTII,S$GLB,, | Performed by: FAMILY MEDICINE

## 2024-12-23 PROCEDURE — 99215 OFFICE O/P EST HI 40 MIN: CPT | Mod: S$GLB,,, | Performed by: FAMILY MEDICINE

## 2024-12-23 PROCEDURE — 3074F SYST BP LT 130 MM HG: CPT | Mod: CPTII,S$GLB,, | Performed by: FAMILY MEDICINE

## 2024-12-23 PROCEDURE — 3008F BODY MASS INDEX DOCD: CPT | Mod: CPTII,S$GLB,, | Performed by: FAMILY MEDICINE

## 2024-12-23 NOTE — PROGRESS NOTES
"  Physical Exam  /70 (BP Location: Left arm, Patient Position: Sitting)   Pulse 72   Temp 97.8 °F (36.6 °C) (Oral)   Ht 5' 11" (1.803 m)   Wt 95 kg (209 lb 7 oz)   SpO2 97%   BMI 29.21 kg/m²      Office Visit    Patient Name: Rod Victor    : 1950  MRN: [id number]      Assessment/Plan:  Rod Victor is a 74 y.o. male who presents today for :    Preoperative examination  Evaluation for bilateral cataract removal  -patient medically optimized for cataract surgery and should be at low risk. May proceed with procedure      Essential hypertension  Dyslipidemia  Atherosclerosis of aorta  Overweight (BMI 25.0-29.9)  -controlled, continue current medication regimen  -DASH diet, regular cardiovascular exercises            Fax to Dr. Ashton's office    ATTN: LUIS  Fax [phone]          Follow up as needed        This note was created by combination of typed  and MModal dictation.  Transcription errors may be present.  If there are any questions, please contact me.        ----------------------------------------------------------------------------------------------------------------------      HPI:  Patient Care Team:  Luis Miguel Buckley MD as PCP - General (Internal Medicine)  Preston Mercer MD (Inactive) as Consulting Physician (Gastroenterology)    Rod is a 74 y.o. male with      Patient Active Problem List   Diagnosis    Left shoulder pain    History of colonic polyps 2021 colonoscopy sigmoid, descending diverticulosis.  Internal hemorrhoids.  2 mm sigmoid polyp.  Path not included    Strabismus    Rotator cuff tendonitis, left    Nodule of upper lobe of right lung 2 mm incidental on CT 2020; nonsmoker. no further testing.    Renal parapelvic cyst giving the appearance of hydronephrosis, not truly hydronephrosis.  no further testing/monitoring    Atherosclerosis of aorta    Palpitations, holter PACs; toprol    Dyslipidemia    Essential hypertension     This " patient is new to me      Rod presents today for:  Pre-op Exam (clearance)    Procedure to be performed: bilateral cataract removal with Dr. Ashton in January 2025    Pt states that pt has had no limitations in activities.  Patient is not a smoker and does not take any blood-thinning medications. His HTN and cholesterol are well controlled on current regimen of medications as managed by his PCP. He denies any CP/SOB/GARCIA. Otherwise, no other acute issues during this visit.      Additional ROS  No F/C/wt changes/fatigue  No dysphagia/sore throat/rhinorrhea  No CP/SOB/palpitations/swelling  No cough/wheezing/SOB  No nausea/vomiting/abd pain  No muscle aches/joint pain   No rashes  No MSK weakness/HA/tingling/numbness  No anxiety/depression  No polyuria/polydipsia/fatigue  No bleeding/bruising          Patient Active Problem List   Diagnosis    Left shoulder pain    History of colonic polyps 07/09/2021 colonoscopy sigmoid, descending diverticulosis.  Internal hemorrhoids.  2 mm sigmoid polyp.  Path not included    Strabismus    Rotator cuff tendonitis, left    Nodule of upper lobe of right lung 2 mm incidental on CT 2/2020; nonsmoker. no further testing.    Renal parapelvic cyst giving the appearance of hydronephrosis, not truly hydronephrosis.  no further testing/monitoring    Atherosclerosis of aorta    Palpitations, holter PACs; toprol    Dyslipidemia    Essential hypertension       Current Medications  Medications reviewed and updated.       Current Outpatient Medications:     amLODIPine (NORVASC) 10 MG tablet, Take 1 tablet (10 mg total) by mouth once daily., Disp: 90 tablet, Rfl: 3    atorvastatin (LIPITOR) 20 MG tablet, Take 1 tablet (20 mg total) by mouth once daily., Disp: 90 tablet, Rfl: 3    metoprolol succinate (TOPROL-XL) 50 MG 24 hr tablet, Take 1 tablet (50 mg total) by mouth once daily., Disp: 90 tablet, Rfl: 3    Past Surgical History:   Procedure Laterality Date    CYSTOSCOPY W/ RETROGRADES  "Bilateral 3/11/2020    Procedure: CYSTOSCOPY, WITH RETROGRADE PYELOGRAM;  Surgeon: ANABELL Vásquez MD;  Location: [facility];  Service: Urology;  Laterality: Bilateral;  RN PREOP 3/4/2020    NO PAST SURGERIES         No family history on file.    Social History     Socioeconomic History    Marital status:    Occupational History    Occupation: retired x 2017   Tobacco Use    Smoking status: Never    Smokeless tobacco: Never   Substance and Sexual Activity    Alcohol use: Not Currently     Comment: socially    Drug use: No    Sexual activity: Yes     Partners: Female     Social Drivers of Health     Financial Resource Strain: Low Risk  (6/27/2024)    Overall Financial Resource Strain (CARDIA)     Difficulty of Paying Living Expenses: Not hard at all   Food Insecurity: No Food Insecurity (6/27/2024)    Hunger Vital Sign     Worried About Running Out of Food in the Last Year: Never true     Ran Out of Food in the Last Year: Never true   Transportation Needs: No Transportation Needs (6/27/2024)    PRAPARE - Transportation     Lack of Transportation (Medical): No     Lack of Transportation (Non-Medical): No   Physical Activity: Insufficiently Active (6/27/2024)    Exercise Vital Sign     Days of Exercise per Week: 2 days     Minutes of Exercise per Session: 60 min   Stress: No Stress Concern Present (6/27/2024)    [employer] [employer] - Occupational Stress Questionnaire     Feeling of Stress : Not at all   Housing Stability: Low Risk  (6/27/2024)    Housing Stability Vital Sign     Unable to Pay for Housing in the Last Year: No     Homeless in the Last Year: No           Allergies   Review of patient's allergies indicates:  No Known Allergies          Review of Systems  See HPI      [unfilled]  /70 (BP Location: Left arm, Patient Position: Sitting)   Pulse 72   Temp 97.8 °F (36.6 °C) (Oral)   Ht 5' 11" (1.803 m)   Wt 95 kg (209 lb 7 oz)   SpO2 97%   BMI 29.21 kg/m²     GEN: NAD, well " developed, pleasant, well nourished  HEENT: NCAT, PERRLA, EOMI, sclera clear, anicteric, O/P clear, MMM with no lesions  NECK: normal, supple with midline trachea, no LAD, no thyromegaly  LUNGS: CTAB, no w/r/r, no increased work of breathing   HEART: RRR, normal S1 and S2, no m/r/g, no edema  ABD: s/nt/nd, NABS  SKIN: normal turgor, no rashes  PSYCH: AOx3, appropriate mood and affect  MSK: warm/well perfused, normal ROM in all extremities, no c/c/e.

## 2025-06-12 ENCOUNTER — PATIENT OUTREACH (OUTPATIENT)
Dept: ADMINISTRATIVE | Facility: HOSPITAL | Age: 75
End: 2025-06-12
Payer: MEDICARE

## 2025-06-13 ENCOUNTER — LAB VISIT (OUTPATIENT)
Dept: LAB | Facility: HOSPITAL | Age: 75
End: 2025-06-13
Attending: INTERNAL MEDICINE
Payer: MEDICARE

## 2025-06-13 DIAGNOSIS — E78.5 DYSLIPIDEMIA: ICD-10-CM

## 2025-06-13 DIAGNOSIS — Z00.00 NORMAL PHYSICAL EXAM: ICD-10-CM

## 2025-06-13 DIAGNOSIS — Z12.5 SCREENING FOR PROSTATE CANCER: ICD-10-CM

## 2025-06-13 DIAGNOSIS — M10.9 PODAGRA: ICD-10-CM

## 2025-06-13 LAB
ALBUMIN SERPL BCP-MCNC: 4.2 G/DL (ref 3.5–5.2)
ALP SERPL-CCNC: 87 UNIT/L (ref 40–150)
ALT SERPL W/O P-5'-P-CCNC: 18 UNIT/L (ref 10–44)
ANION GAP (OHS): 8 MMOL/L (ref 8–16)
AST SERPL-CCNC: 19 UNIT/L (ref 11–45)
BILIRUB SERPL-MCNC: 0.6 MG/DL (ref 0.1–1)
BUN SERPL-MCNC: 15 MG/DL (ref 8–23)
CALCIUM SERPL-MCNC: 9.2 MG/DL (ref 8.7–10.5)
CHLORIDE SERPL-SCNC: 107 MMOL/L (ref 95–110)
CHOLEST SERPL-MCNC: 111 MG/DL (ref 120–199)
CHOLEST/HDLC SERPL: 2.2 {RATIO} (ref 2–5)
CO2 SERPL-SCNC: 25 MMOL/L (ref 23–29)
CREAT SERPL-MCNC: 1.1 MG/DL (ref 0.5–1.4)
ERYTHROCYTE [DISTWIDTH] IN BLOOD BY AUTOMATED COUNT: 12.6 % (ref 11.5–14.5)
GFR SERPLBLD CREATININE-BSD FMLA CKD-EPI: >60 ML/MIN/1.73/M2
GLUCOSE SERPL-MCNC: 86 MG/DL (ref 70–110)
HCT VFR BLD AUTO: 42.1 % (ref 40–54)
HDLC SERPL-MCNC: 51 MG/DL (ref 40–75)
HDLC SERPL: 45.9 % (ref 20–50)
HGB BLD-MCNC: 14.2 GM/DL (ref 14–18)
LDLC SERPL CALC-MCNC: 52.8 MG/DL (ref 63–159)
MCH RBC QN AUTO: 33.5 PG (ref 27–31)
MCHC RBC AUTO-ENTMCNC: 33.7 G/DL (ref 32–36)
MCV RBC AUTO: 99 FL (ref 82–98)
NONHDLC SERPL-MCNC: 60 MG/DL
PLATELET # BLD AUTO: 229 K/UL (ref 150–450)
PMV BLD AUTO: 10.9 FL (ref 9.2–12.9)
POTASSIUM SERPL-SCNC: 4.2 MMOL/L (ref 3.5–5.1)
PROT SERPL-MCNC: 7.4 GM/DL (ref 6–8.4)
PSA SERPL-MCNC: 0.83 NG/ML
RBC # BLD AUTO: 4.24 M/UL (ref 4.6–6.2)
SODIUM SERPL-SCNC: 140 MMOL/L (ref 136–145)
TRIGL SERPL-MCNC: 36 MG/DL (ref 30–150)
URATE SERPL-MCNC: 6.9 MG/DL (ref 3.4–7)
WBC # BLD AUTO: 8.18 K/UL (ref 3.9–12.7)

## 2025-06-13 PROCEDURE — 84550 ASSAY OF BLOOD/URIC ACID: CPT

## 2025-06-13 PROCEDURE — 82040 ASSAY OF SERUM ALBUMIN: CPT

## 2025-06-13 PROCEDURE — 85027 COMPLETE CBC AUTOMATED: CPT

## 2025-06-13 PROCEDURE — 36415 COLL VENOUS BLD VENIPUNCTURE: CPT | Mod: PO

## 2025-06-13 PROCEDURE — 84153 ASSAY OF PSA TOTAL: CPT

## 2025-06-13 PROCEDURE — 80061 LIPID PANEL: CPT

## 2025-06-23 DIAGNOSIS — Z00.00 ENCOUNTER FOR MEDICARE ANNUAL WELLNESS EXAM: ICD-10-CM

## 2025-06-30 ENCOUNTER — RESULTS FOLLOW-UP (OUTPATIENT)
Dept: FAMILY MEDICINE | Facility: CLINIC | Age: 75
End: 2025-06-30

## 2025-07-08 ENCOUNTER — TELEPHONE (OUTPATIENT)
Dept: FAMILY MEDICINE | Facility: CLINIC | Age: 75
End: 2025-07-08
Payer: MEDICARE

## 2025-07-08 NOTE — TELEPHONE ENCOUNTER
Copied from CRM #[id number]. Topic: Appointments - Appointment Rescheduling  >> Jul 8, 2025  3:55 PM Finesse wrote:  Type: Patient Call Back    Who called: self    What is the request in detail: Patient called scheduling for his annual. First appt offered was January 14th which patient declined. Rosalio reach out to patient to get an appt for August. Thank you.    Best call back number: [phone]      Additional Information:

## 2025-07-18 ENCOUNTER — OFFICE VISIT (OUTPATIENT)
Dept: CARDIOLOGY | Facility: CLINIC | Age: 75
End: 2025-07-18
Payer: MEDICARE

## 2025-07-18 VITALS
DIASTOLIC BLOOD PRESSURE: 68 MMHG | OXYGEN SATURATION: 97 % | BODY MASS INDEX: 29.5 KG/M2 | SYSTOLIC BLOOD PRESSURE: 106 MMHG | WEIGHT: 210.75 LBS | HEART RATE: 71 BPM | RESPIRATION RATE: 18 BRPM | HEIGHT: 71 IN

## 2025-07-18 DIAGNOSIS — E78.5 DYSLIPIDEMIA: Primary | ICD-10-CM

## 2025-07-18 DIAGNOSIS — I70.0 ATHEROSCLEROSIS OF AORTA: ICD-10-CM

## 2025-07-18 DIAGNOSIS — I10 ESSENTIAL HYPERTENSION: ICD-10-CM

## 2025-07-18 DIAGNOSIS — I27.20 PULMONARY HYPERTENSION: ICD-10-CM

## 2025-07-18 DIAGNOSIS — R00.2 PALPITATIONS: ICD-10-CM

## 2025-07-18 LAB
OHS QRS DURATION: 86 MS
OHS QTC CALCULATION: 381 MS

## 2025-07-18 PROCEDURE — 99999 PR PBB SHADOW E&M-EST. PATIENT-LVL III: CPT | Mod: PBBFAC,,, | Performed by: INTERNAL MEDICINE

## 2025-07-18 RX ORDER — AMLODIPINE BESYLATE 5 MG/1
5 TABLET ORAL DAILY
Qty: 90 TABLET | Refills: 3 | Status: SHIPPED | OUTPATIENT
Start: 2025-07-18

## 2025-07-18 NOTE — PROGRESS NOTES
Subjective   Patient ID:  Rod Victor is a 74 y.o. male who presents for follow-up of Follow-up      HPI      HTN, HLD    Referred by Dr Buckley  ER 5/14 for LE edema.  Troponin negative. BNP WNL.   EKG WNL. CXR no acute process.  CBC, CMP WNL. UA normal     He thinks that the edema was due to salt intake.  Negative workup as above.     Having some left-sided low back pain.  It is a cramping sensation.  After standing for periods of time.  He puts cold packs on it and it does seem to help.  Lays on a heat pad in the mornings as well.  He had previously complained about this.  Felt to be musculoskeletal but the workup for this to rule out intra-abdominal pathology showed incidental hydronephrosis for which he has undergone workup with Urology.  At last urology visit, plan was to round out the workup with a CT urogram.  And then coronavirus came and so he never got that done.     He does not do any sort specific stretches for his back in general.     Went for colonoscopy and was told he had arrhythmia.  [facility]  It is unclear what the arrhythmia was.  He was told he needed clearance in regards to the arrhythmia.  Denies symptomatic palpitations, jitteriness, lightheadedness, chest pain, shortness of breath.      EKG 5/14/21 NSR - ok     Echo 10/26/23      Left Ventricle: There is normal systolic function with a visually estimated ejection fraction of 60 - 65%. Grade I diastolic dysfunction.    Left Atrium: Left atrium is moderately dilated.    Right Ventricle: Normal right ventricular cavity size. Systolic function is normal.    Mitral Valve: There is mild regurgitation.    Pulmonary Artery: The estimated pulmonary artery systolic pressure is 39 mmHg.    IVC/SVC: Normal venous pressure at 3 mmHg.    Bubble study negative        Holter 6/3/21  Sinus rhythm with heart rates varying between 56 and 138 bpm with an average of 85 bpm  There were very rare PVC  Four beat run of VT.  There were very  frequent PACs (9525)  Short runs of atrial tachycardia. Longest 13 beats.     5/19/21 Denies prior cardiac Hx. Mild GARCIA. Denies CP  BP controlled  EKG NSR frequent PACs and blocked PACs    Echo and holter for frequent PACs and GARCIA     6/10/21 Denies CP or SOB. Occasional palpitations    Referred by Dr Buckley   Add toprol XL 25 qd for frequent PACs  Cleared for colonoscopy  OV 6 months     12/15/21 Denies CP or SOB. BP/HR mildly elevated  EKG NSR NSSTT changes  Increase toprol XL for BP and palpitations  Continue Rx for HTN, atherosclerosis, HTN  OV 6 months     6/22/22 Denies CP, mild GARCIA. Denies palpitations  EKG NSR NSSTT changes   Continue Rx for HTN, atherosclerosis, HLD, palpitations  OV 1 year     6/2/23 Denies CP or SOB. Exercises regularly  EKG NSR LVH  BP controlled   Continue Rx for HTN, atherosclerosis, HLD, palpitations  OV 1 year     Admitted 10/26/23  73 year old male with past medical history of hypertension, dyslipidemia, palpitations, L strabismus admitted to observation on 10/26/2023 for further evaluation of HTN urgency and blurred vision. He has been compliant with his medicatoins. Admit he ate a poboy for his birthday which may have been high in sodium. Labs unremarkable. CT head with no CT evidence of acute intracranial abnormality. Findings suggestive of chronic microvascular ischemic change. MRI brain w/No evidence of acute intracranial pathology.  Neurology consulted-recommends CTA head and neck. Okay for normotension. CTA head and neck showed No large vessel occlusion in the intracranial circulation. . Echo showed estimated ejection fraction of 60 - 65%. Grade I diastolic dysfunction.Bubble study negative . okay for discharge per neurology. Resumed home metoprolol. Initiated pt on norvasc and PO hydralazine with improvement in his blood pressure. Pt denies any fever, headaches, vision changes, chest pain, shortness of breath, palpitations, abdominal pain, nausea, vomiting, or any new  weaknesses. Feels ready to go home. Patient's exam on discharge was as follow: Patient is alert and oriented, appears in no acute distress, heart with regular rate and rhythm, lungs clear to asculation with non-labored breathing, abdomen soft, and no new weaknesses or focal deficits seen. Bilateral lower extremities without any edema or calf tenderness.      4/2/24 Denies CP or SOB  BP well controlled by home readings   Continue Rx for HTN, atherosclerosis, HLD, palpitations  OV 1 year     7/18/25 SBP frequently drops < 100 and he feels dizzy. Denies CP, mild GARCIA  EKG NSR PRWP NSSTT changes    Review of Systems   Constitutional: Negative for decreased appetite.   HENT:  Negative for ear discharge.    Eyes:  Negative for blurred vision.   Endocrine: Negative for polyphagia.   Skin:  Negative for nail changes.   Genitourinary:  Negative for bladder incontinence.   Neurological:  Negative for aphonia.   Psychiatric/Behavioral:  Negative for hallucinations.    Allergic/Immunologic: Negative for hives.          Objective     Physical Exam  Constitutional:       Appearance: He is well-developed.   HENT:      Head: Normocephalic and atraumatic.   Eyes:      Conjunctiva/sclera: Conjunctivae normal.      Pupils: Pupils are equal, round, and reactive to light.   Cardiovascular:      Rate and Rhythm: Normal rate.      Pulses: Intact distal pulses.      Heart sounds: Normal heart sounds.   Pulmonary:      Effort: Pulmonary effort is normal.      Breath sounds: Normal breath sounds.   Abdominal:      General: Bowel sounds are normal.      Palpations: Abdomen is soft.   Musculoskeletal:         General: Normal range of motion.      Cervical back: Normal range of motion and neck supple.   Skin:     General: Skin is warm and dry.   Neurological:      Mental Status: He is alert and oriented to person, place, and time.            Assessment and Plan     1. Dyslipidemia    2. Pulmonary hypertension    3. Atherosclerosis of aorta    4.  Palpitations, holter PACs; toprol    5. Essential hypertension        Plan:     Decrease norvasc 5 qd with episodes of hypotension  OV 3 months with BP check and repeat echo for GARCIA and pulmonary HTN   Continue Rx for HTN, atherosclerosis, HLD, palpitations    Advance Care Planning     Date: 07/18/2025  Patient did not wish or was not able to name a surrogate decision maker or provide an Advance Care Plan.

## 2025-07-24 NOTE — PROGRESS NOTES
This note was created by combination of typed  and M-Modal dictation.  Transcription errors may be present.   This note was also generated with the assistance of ambient listening technology. Verbal consent was obtained by the patient and accompanying visitor(s) for the recording of patient appointment to facilitate this note. I attest to having reviewed and edited the generated note for accuracy, though some syntax or spelling errors may persist. Please contact the author of this note for any clarification.    Assessment and Plan:   Assessment and Plan   Assessment & Plan  Normal physical exam  Screening for prostate cancer  History of colonic polyps 07/09/2021 colonoscopy sigmoid, descending diverticulosis.  Internal hemorrhoids.  2 mm sigmoid polyp.  Path not included  Pre visit labs reviewed   Exercising regularly, home gym equipment   Watch his for added sugars.  Colonoscopy due 2026.  Was getting through [employer] but his gastroenterologist retired.  He has not decided whether to pursue it with [employer] or with us next year  Orders:    CBC Without Differential; Future    Comprehensive Metabolic Panel; Future    Lipid Panel; Future    Uric Acid; Future    PSA, Screening; Future    Essential hypertension  Palpitations, holter PACs; toprol  Had noted episodes of lightheadedness, his amlodipine was recently decreased.  Has felt a bit better since cutting down but has been about a week.  He was previously on metoprolol and then had an incident where he went to the ER with high blood pressure and was started on amlodipine and hydralazine.  His hydralazine was weaned off and his amlodipine dose is decreasing.  He thinks that that episode was situational.  Stay on Toprol-XL   Enroll in digital monitoring.  If his blood pressures remain low he might be able to stop the amlodipine and just stay on metoprolol  Saw cardiology and plan is follow up in about 3 months with echo.  He does note episodic  dyspnea  Orders:    Hypertension Digital Medicine (HDMP) Enrollment Order    metoprolol succinate (TOPROL-XL) 50 MG 24 hr tablet; Take 1 tablet (50 mg total) by mouth once daily.    Dyslipidemia  Atherosclerosis of aorta  Pre visit labs reviewed, stable on atorvastatin no changes  Orders:    CBC Without Differential; Future    Comprehensive Metabolic Panel; Future    Lipid Panel; Future    atorvastatin (LIPITOR) 20 MG tablet; Take 1 tablet (20 mg total) by mouth once daily.    Nodule of upper lobe of right lung 2 mm incidental on CT 2/2020; nonsmoker. no further testing.  Stable no further tests       Pulmonary hypertension  Stable.  Plan for echo       Podagra  Has not had any flares.  Uric acid not ideal.  Not on medication.  Monitor.  Orders:    Uric Acid; Future      Medications Discontinued During This Encounter   Medication Reason    atorvastatin (LIPITOR) 20 MG tablet Reorder    metoprolol succinate (TOPROL-XL) 50 MG 24 hr tablet Reorder       meds sent this encounter:     Medications Ordered This Encounter   Medications    atorvastatin (LIPITOR) 20 MG tablet     Sig: Take 1 tablet (20 mg total) by mouth once daily.     Dispense:  90 tablet     Refill:  3     Pharmacy update refills, keep on file, not requesting Rx to be filled today.    metoprolol succinate (TOPROL-XL) 50 MG 24 hr tablet     Sig: Take 1 tablet (50 mg total) by mouth once daily.     Dispense:  90 tablet     Refill:  3     .Pharmacy update refills, keep on file, not requesting Rx to be filled today.        Follow Up:  Plan for physical exam 1 year  Future Appointments   Date Time Provider Department Center   8/1/2025 10:30 AM Dionisio Brush NP [facility]   7/27/2026  7:15 AM [facility] [place]   7/30/2026  2:20 PM Luis Miguel Buckley MD [facility]          Subjective:   Subjective   Chief Complaint   Patient presents with    Annual Exam    Hypertension       WALKER Velasco is a 74 y.o. male.     Social History      Socioeconomic History    Marital status:    Occupational History    Occupation: retired x 2017   Tobacco Use    Smoking status: Never    Smokeless tobacco: Never   Substance and Sexual Activity    Alcohol use: Not Currently     Comment: socially    Drug use: No    Sexual activity: Yes     Partners: Female     Social Drivers of Health     Financial Resource Strain: Low Risk  (7/11/2025)    Overall Financial Resource Strain (CARDIA)     Difficulty of Paying Living Expenses: Not hard at all   Food Insecurity: No Food Insecurity (7/11/2025)    Hunger Vital Sign     Worried About Running Out of Food in the Last Year: Never true     Ran Out of Food in the Last Year: Never true   Transportation Needs: No Transportation Needs (7/11/2025)    PRAPARE - Transportation     Lack of Transportation (Medical): No     Lack of Transportation (Non-Medical): No   Physical Activity: Sufficiently Active (7/11/2025)    Exercise Vital Sign     Days of Exercise per Week: 4 days     Minutes of Exercise per Session: 60 min   Stress: No Stress Concern Present (7/11/2025)    [employer] - Occupational Stress Questionnaire     Feeling of Stress : Not at all   Housing Stability: Low Risk  (7/11/2025)    Housing Stability Vital Sign     Unable to Pay for Housing in the Last Year: No     Number of Times Moved in the Last Year: 0     Homeless in the Last Year: No       Last appointment with this clinic was Visit date not found. Last visit with me 6/20/2024   To summarize last visit and events leading up to today:  Hospitalization 10/26 through 10/27/2023 initial presentation of headache.  High blood pressure.  Discharged on amlodipine 10, hydralazine 25 t.i.d., aspirin  MRI brain negative   CT head negative   CTA head and neck negative   Incidental aortic atherosclerosis.  Started on atorvastatin   Palpitations with evaluation showing PACs.  On metoprolol.  Followed by Cardiology.  Most recently seen  06/02/2023, stable, follow-up 1 year  06/03/2021 TTE LV normal size with concentric hypertrophy and normal systolic function LVEF 55%.  Normal diastolic function.  Normal RV size and systolic function.  Intermediate CVP 8. PA pressure 35.   06/03/2021 Holter 4 beat run of VT.  PACs.  Started on Toprol XL for the PACs  07/09/2021 colonoscopy sigmoid, descending diverticulosis.  Internal hemorrhoids.  2 mm sigmoid polyp.  Path not included.  Gout? christmastime 12/2022; check future labs. Work on diet.     Last visit with me 06/20/2024 for physical exam  Colonoscopy due 2026  Hypertension palpitations stable no subjective palpitations.  No changes   Hyperlipidemia statin  Podagra high uric acid hold on prophylaxis    Saw my colleague 12/23/2024 for preop for cataract surgery    Labs 06/13/2025   CBC microcytosis otherwise normal   CMP normal   Lipid good  PSA normal  Uric acid better > 6.0 still    07/18/2025 saw cardiology, episodes of hypotension.  Decrease Norvasc to 5.  Follow up 3 months with a BP check, repeat echo    Needs to schedule cardiology follow up for October Today's visit:    History of Present Illness    SOCIAL HISTORY:  - Denies alcohol use for 5-10 years    HPI:  Rod has been evaluated by cardiology last week due to episodes of low blood pressure. His amlodipine dosage was reduced from 10mg to 5mg. Since the medication adjustment, he reports improvement with reduced dizziness and fatigue. He has been monitoring his blood pressure daily at home, noting that it has consistently been over 100 systolic since the dosage change, ranging from 107 to 120. Prior to the adjustment, he occasionally had lightheadedness or dizziness when standing.    He mentions intermittent swelling in both ankles, particularly after prolonged periods of standing. This swelling is not constant and appears to be related to activity levels.    He reports nocturia, stating that he sometimes urinates at night or in the  "morning, which he attributes to high water intake.    Approximately 1 year ago, he had what was believed to be an isolated incident of gout in his foot. This occurred after consuming a new food item late at night, described as a tea and caramel-based soup. He has not had similar symptoms since that incident.    He denies chest pain, palpitations, headaches, upset stomach, nausea, constipation, blood in stool, and painful or burning urination.    MEDICATIONS:  - Amlodipine 5 mg, daily, for blood pressure control (recently reduced from 10 mg)  - Metoprolol, for blood pressure control  - Atorvastatin, for cholesterol control  - Discontinued hydralazine, previously used for blood pressure control  - Discontinued hydrochlorothiazide, previously used for blood pressure control    ROS:  Constitutional: reports fatigue, reports dizziness  Head: no headaches  Cardiovascular: no chest pain, no palpitations, reports lower extremity edema  Gastrointestinal: no nausea, no constipation  Genitourinary: no dysuria, reports nocturia         Patient Care Team:  Luis Miguel Buckley MD as PCP - General (Internal Medicine)  Preston Mercer MD (Inactive) as Consulting Physician (Gastroenterology)    Patient Active Problem List    Diagnosis Date Noted    Pulmonary hypertension 07/18/2025    Essential hypertension 10/26/2023     10/26/2023 TTE normal systolic function LVEF 60-65%.  Grade 1 diastolic dysfunction.      Dyslipidemia 12/21/2022    Palpitations, holter PACs; toprol 05/19/2021 06/03/2021 TTE LV normal size with concentric hypertrophy and normal systolic function LVEF 55%.  Normal diastolic function.  Normal RV size and systolic function.  Intermediate CVP 8. PA pressure 35.   06/03/2021 Holter 4 beat run of VT.  PACs.  Started on Toprol XL for the PACs  10/26/2023 TTE normal systolic function LVEF 60-65%.  Grade 1 diastolic dysfunction.      Atherosclerosis of aorta 10/20/2020     " Atherosclerotic calcification is " "noted of the aorta and its branches" - CT Abdomen Pelvis Without Contrast 2-7-2020      Nodule of upper lobe of right lung 2 mm incidental on CT 2/2020; nonsmoker. no further testing. 02/10/2020     2/2020 CT abd/pelvis: 2. 2 mm pulmonary nodule within the right upper lobe versus atelectasis.  For a solid nodule <6 mm, Fleischner Society 2017 guidelines recommend no routine follow up for a low risk patient, or follow-up with non-contrast chest CT at 12 months in a high risk patient.      Renal parapelvic cyst giving the appearance of hydronephrosis, not truly hydronephrosis.  no further testing/monitoring 02/10/2020     1/29/2020 renal US: Mild left hydronephrosis for which clinical and historical correlation is needed. Bilateral ureteral jets are noted.  2/7/2020 CT abd/pelvis without: 1. Moderate left hydronephrosis without hydroureter.  The appearance is nonspecific however may reflect that of chronic UPJ obstruction.  No findings to suggest obstructive uropathy or nephrolithiasis.   6/4/21 CT urogram: No evidence of hydronephrosis.  Appearance on previous CT and ultrasound was related to parapelvic cysts evident on current exam.  Colonic diverticula without evidence of diverticulitis  Small fat containing umbilical hernia.      History of colonic polyps 07/09/2021 colonoscopy sigmoid, descending diverticulosis.  Internal hemorrhoids.  2 mm sigmoid polyp.  Path not included 10/24/2018     adenoma 2013  07/09/2021 colonoscopy sigmoid, descending diverticulosis.  Internal hemorrhoids.  2 mm sigmoid polyp.  Path not included      Strabismus 10/24/2018    Rotator cuff tendonitis, left 10/24/2018    Left shoulder pain 09/13/2017       PAST MEDICAL PROBLEMS, PAST SURGICAL HISTORY: please see relevant portions of the electronic medical record    ALLERGIES AND MEDICATIONS: updated and reviewed.  Medication List with Changes/Refills   Current Medications    AMLODIPINE (NORVASC) 5 MG TABLET    Take 1 tablet (5 mg total) by " "mouth once daily.    ATORVASTATIN (LIPITOR) 20 MG TABLET    Take 1 tablet (20 mg total) by mouth once daily.    METOPROLOL SUCCINATE (TOPROL-XL) 50 MG 24 HR TABLET    Take 1 tablet (50 mg total) by mouth once daily.         Objective:   Objective   Physical Exam   Vitals:    07/25/25 1405   BP: 108/70   Pulse: 72   Temp: 98 °F (36.7 °C)   TempSrc: Oral   SpO2: 96%   Weight: 95.2 kg (209 lb 14.1 oz)   Height: 5' 11" (1.803 m)    Body mass index is 29.27 kg/m².            Physical Exam  Constitutional:       Appearance: Normal appearance. He is well-developed.   HENT:      Right Ear: Tympanic membrane and external ear normal.      Left Ear: Tympanic membrane and external ear normal.      Nose: Nose normal.   Eyes:      General: No scleral icterus.     Conjunctiva/sclera: Conjunctivae normal.      Comments: Left lateral strabismus   Neck:      Thyroid: No thyroid mass or thyromegaly.      Trachea: Trachea normal.   Cardiovascular:      Rate and Rhythm: Normal rate and regular rhythm.      Heart sounds: Normal heart sounds, S1 normal and S2 normal. No murmur heard.  Pulmonary:      Effort: Pulmonary effort is normal.      Breath sounds: Normal breath sounds.   Abdominal:      General: There is no distension.      Palpations: Abdomen is soft. There is no hepatomegaly, splenomegaly or mass.      Tenderness: There is no abdominal tenderness.   Musculoskeletal:         General: No deformity.      Right lower leg: No edema.      Left lower leg: No edema.   Lymphadenopathy:      Cervical: No cervical adenopathy.   Skin:     General: Skin is warm and dry.      Findings: No rash (on exposed skin).      Comments: On exposed skin   Neurological:      Mental Status: He is alert and oriented to person, place, and time.      Cranial Nerves: No cranial nerve deficit.      Sensory: No sensory deficit.      Deep Tendon Reflexes: Reflexes are normal and symmetric.   Psychiatric:         Speech: Speech normal.         Behavior: Behavior " normal.         Thought Content: Thought content normal.         Judgment: Judgment normal.         Component      Latest Ref Rng 6/13/2024 6/13/2025   Sodium      136 - 145 mmol/L 141  140    Potassium      3.5 - 5.1 mmol/L 4.3  4.2    Chloride      95 - 110 mmol/L 108  107    CO2      23 - 29 mmol/L 23  25    Glucose      70 - 110 mg/dL 92  86    BUN      8 - 23 mg/dL 19  15    Creatinine      0.5 - 1.4 mg/dL 1.1  1.1    Calcium      8.7 - 10.5 mg/dL 9.8  9.2    PROTEIN TOTAL      6.0 - 8.4 gm/dL 7.1  7.4    Albumin      3.5 - 5.2 g/dL 3.9  4.2    BILIRUBIN TOTAL      0.1 - 1.0 mg/dL 0.8  0.6    ALP      40 - 150 unit/L 90  87    AST      11 - 45 unit/L 22  19    ALT      10 - 44 unit/L 23  18    eGFR      >60 mL/min/1.73/m2 >60.0  >60    Anion Gap      8 - 16 mmol/L 10  8    WBC      3.90 - 12.70 K/uL 6.77  8.18    RBC      4.60 - 6.20 M/uL 4.17 (L)  4.24 (L)    Hemoglobin      14.0 - 18.0 gm/dL 13.9 (L)  14.2    Hematocrit      40.0 - 54.0 % 41.2  42.1    MCV      82 - 98 fL 99 (H)  99 (H)    MCH      27.0 - 31.0 pg 33.3 (H)  33.5 (H)    MCHC      32.0 - 36.0 g/dL 33.7  33.7    RDW      11.5 - 14.5 % 12.7  12.6    Platelet Count      150 - 450 K/uL 225  229    MPV      9.2 - 12.9 fL 11.5  10.9    Cholesterol Total      120 - 199 mg/dL 120  111 (L)    Triglycerides      30 - 150 mg/dL 38  36    HDL      40 - 75 mg/dL 50  51    LDL Cholesterol      63.0 - 159.0 mg/dL 62.4 (L)  52.8 (L)    HDL/Cholesterol Ratio      20.0 - 50.0 % 41.7  45.9    Total Cholesterol/HDL Ratio      2.0 - 5.0  2.4  2.2    Non-HDL Cholesterol      mg/dL 70  60    Hemoglobin A1C External      4.0 - 5.6 % 5.5     Estimated Avg Glucose      68 - 131 mg/dL 111     Prostate Specific Antigen      <=4.00 ng/mL 0.83  0.83    Uric Acid      3.4 - 7.0 mg/dL 7.6 (H)  6.9       Legend:  (L) Low  (H) High

## 2025-07-24 NOTE — ASSESSMENT & PLAN NOTE
Had noted episodes of lightheadedness, his amlodipine was recently decreased.  Has felt a bit better since cutting down but has been about a week.  He was previously on metoprolol and then had an incident where he went to the ER with high blood pressure and was started on amlodipine and hydralazine.  His hydralazine was weaned off and his amlodipine dose is decreasing.  He thinks that that episode was situational.  Stay on Toprol-XL   Enroll in digital monitoring.  If his blood pressures remain low he might be able to stop the amlodipine and just stay on metoprolol  Saw cardiology and plan is follow up in about 3 months with echo.  He does note episodic dyspnea  Orders:    Hypertension Digital Medicine (HDMP) Enrollment Order    metoprolol succinate (TOPROL-XL) 50 MG 24 hr tablet; Take 1 tablet (50 mg total) by mouth once daily.

## 2025-07-24 NOTE — ASSESSMENT & PLAN NOTE
Pre visit labs reviewed, stable on atorvastatin no changes  Orders:    CBC Without Differential; Future    Comprehensive Metabolic Panel; Future    Lipid Panel; Future    atorvastatin (LIPITOR) 20 MG tablet; Take 1 tablet (20 mg total) by mouth once daily.

## 2025-07-24 NOTE — ASSESSMENT & PLAN NOTE
Pre visit labs reviewed   Exercising regularly, home gym equipment   Watch his for added sugars.  Colonoscopy due 2026.  Was getting through [employer] but his gastroenterologist retired.  He has not decided whether to pursue it with [employer] or with us next year  Orders:    CBC Without Differential; Future    Comprehensive Metabolic Panel; Future    Lipid Panel; Future    Uric Acid; Future    PSA, Screening; Future

## 2025-07-25 ENCOUNTER — OFFICE VISIT (OUTPATIENT)
Dept: FAMILY MEDICINE | Facility: CLINIC | Age: 75
End: 2025-07-25
Payer: MEDICARE

## 2025-07-25 VITALS
HEART RATE: 72 BPM | TEMPERATURE: 98 F | WEIGHT: 209.88 LBS | OXYGEN SATURATION: 96 % | HEIGHT: 71 IN | BODY MASS INDEX: 29.38 KG/M2 | DIASTOLIC BLOOD PRESSURE: 70 MMHG | SYSTOLIC BLOOD PRESSURE: 108 MMHG

## 2025-07-25 DIAGNOSIS — M10.9 PODAGRA: ICD-10-CM

## 2025-07-25 DIAGNOSIS — R91.1 NODULE OF UPPER LOBE OF RIGHT LUNG: ICD-10-CM

## 2025-07-25 DIAGNOSIS — I27.20 PULMONARY HYPERTENSION: ICD-10-CM

## 2025-07-25 DIAGNOSIS — Z12.5 SCREENING FOR PROSTATE CANCER: ICD-10-CM

## 2025-07-25 DIAGNOSIS — Z86.0100 HISTORY OF COLONIC POLYPS: ICD-10-CM

## 2025-07-25 DIAGNOSIS — R00.2 PALPITATIONS: ICD-10-CM

## 2025-07-25 DIAGNOSIS — I10 ESSENTIAL HYPERTENSION: ICD-10-CM

## 2025-07-25 DIAGNOSIS — Z00.00 NORMAL PHYSICAL EXAM: Primary | ICD-10-CM

## 2025-07-25 DIAGNOSIS — E78.5 DYSLIPIDEMIA: ICD-10-CM

## 2025-07-25 DIAGNOSIS — I70.0 ATHEROSCLEROSIS OF AORTA: ICD-10-CM

## 2025-07-25 PROCEDURE — 99999 PR PBB SHADOW E&M-EST. PATIENT-LVL III: CPT | Mod: PBBFAC,,, | Performed by: INTERNAL MEDICINE

## 2025-07-25 RX ORDER — ATORVASTATIN CALCIUM 20 MG/1
20 TABLET, FILM COATED ORAL DAILY
Qty: 90 TABLET | Refills: 3 | Status: SHIPPED | OUTPATIENT
Start: 2025-07-25

## 2025-07-25 RX ORDER — METOPROLOL SUCCINATE 50 MG/1
50 TABLET, EXTENDED RELEASE ORAL DAILY
Qty: 90 TABLET | Refills: 3 | Status: SHIPPED | OUTPATIENT
Start: 2025-07-25

## 2025-07-29 DIAGNOSIS — I10 ESSENTIAL HYPERTENSION: ICD-10-CM

## 2025-07-29 RX ORDER — AMLODIPINE BESYLATE 10 MG/1
10 TABLET ORAL
Qty: 90 TABLET | Refills: 0 | OUTPATIENT
Start: 2025-07-29

## 2025-07-29 NOTE — TELEPHONE ENCOUNTER
No care due was identified.  [facility] Embedded Care Due Messages. Reference number: [id number].   7/29/2025 6:41:53 AM CDT

## 2025-07-29 NOTE — TELEPHONE ENCOUNTER
Refill Decision Note   Rod Victor  is requesting a refill authorization.  Brief Assessment and Rationale for Refill:  Quick Discontinue     Medication Therapy Plan: The original prescription was reordered on 7/18/2025 by Frandy Thompson MD.; 5 MG      Comments:     Note composed:3:47 PM 07/29/2025

## 2025-08-01 ENCOUNTER — OFFICE VISIT (OUTPATIENT)
Dept: FAMILY MEDICINE | Facility: CLINIC | Age: 75
End: 2025-08-01
Payer: MEDICARE

## 2025-08-01 VITALS
SYSTOLIC BLOOD PRESSURE: 118 MMHG | OXYGEN SATURATION: 96 % | TEMPERATURE: 98 F | WEIGHT: 211.63 LBS | DIASTOLIC BLOOD PRESSURE: 70 MMHG | HEIGHT: 71 IN | HEART RATE: 94 BPM | BODY MASS INDEX: 29.63 KG/M2

## 2025-08-01 DIAGNOSIS — I10 ESSENTIAL HYPERTENSION: Primary | ICD-10-CM

## 2025-08-01 DIAGNOSIS — Z00.00 ENCOUNTER FOR MEDICARE ANNUAL WELLNESS EXAM: ICD-10-CM

## 2025-08-01 PROCEDURE — 99999 PR PBB SHADOW E&M-EST. PATIENT-LVL IV: CPT | Mod: PBBFAC,,, | Performed by: NURSE PRACTITIONER

## 2025-08-01 NOTE — PROGRESS NOTES
"  Rod Victor presented for a  Medicare AWV and comprehensive Health Risk Assessment today. The following components were reviewed and updated:    Medical history  Family History  Social history  Allergies and Current Medications  Health Risk Assessment  Health Maintenance  Care Team         ** See Completed Assessments for Annual Wellness Visit within the encounter summary.**         The following assessments were completed:  Living Situation  CAGE  Depression Screening  Timed Get Up and Go  Whisper Test  Cognitive Function Screening  Nutrition Screening  ADL Screening  PAQ Screening      Opioid documentation:      Patient does not have a current opioid prescription.        Vitals:    08/01/25 1019   BP: 118/70   Pulse: 94   Temp: 97.8 °F (36.6 °C)   SpO2: 96%   Weight: 96 kg (211 lb 10.3 oz)   Height: 5' 11" (1.803 m)     Body mass index is 29.52 kg/m².  Physical Exam  Vitals reviewed.   Constitutional:       General: He is not in acute distress.     Appearance: Normal appearance. He is not ill-appearing, toxic-appearing or diaphoretic.   HENT:      Head: Normocephalic and atraumatic.   Pulmonary:      Effort: Pulmonary effort is normal. No respiratory distress.      Breath sounds: No wheezing.   Skin:     General: Skin is warm and dry.   Neurological:      Mental Status: He is alert and oriented to person, place, and time.   Psychiatric:         Mood and Affect: Mood normal.         Behavior: Behavior normal.               Diagnoses and health risks identified today and associated recommendations/orders:    1. Encounter for Medicare annual wellness exam  The patient was seen today for an annual Medicare wellness exam.  Health maintenance and screening topics were discussed.  Proper diet and exercise recommendations were reviewed.  - Referral to Enhanced Annual Wellness Visit (eAWV) W+1    2. Essential hypertension  Controlled on metoprolol and amlodipine.  Continue.      Provided Rod with a 5-10 year " written screening schedule and personal prevention plan. Recommendations were developed using the USPSTF age appropriate recommendations. Education, counseling, and referrals were provided as needed. After Visit Summary printed and given to patient which includes a list of additional screenings\tests needed.    Follow up in about 1 year (around 8/1/2026) for AWV.    Dionisio Brush, NP  I offered to discuss advanced care planning, including how to pick a person who would make decisions for you if you were unable to make them for yourself, called a health care power of , and what kind of decisions you might make such as use of life sustaining treatments such as ventilators and tube feeding when faced with a life limiting illness recorded on a living will that they will need to know. (How you want to be cared for as you near the end of your natural life)     X Patient is interested in learning more about how to make advanced directives.  I provided them paperwork and offered to discuss this with them.

## 2025-08-01 NOTE — PATIENT INSTRUCTIONS
Counseling and Referral of Other Preventative  (Italic type indicates deductible and co-insurance are waived)    Patient Name: Rod Victor  Today's Date: 8/1/2025    Health Maintenance       Date Due Completion Date    Influenza Vaccine (1) 09/01/2025 9/16/2024    Colorectal Cancer Screening 07/09/2026 7/9/2021    High Dose Statin 07/25/2026 7/25/2025    Lipid Panel 06/13/2030 6/13/2025    TETANUS VACCINE 05/18/2031 5/18/2021        No orders of the defined types were placed in this encounter.      The following information is provided to all patients.  This information is to help you find resources for any of the problems found today that may be affecting your health:                  Living healthy guide: www.[facility].[place].gov      Understanding Diabetes: www.diabetes.org      Eating healthy: www.cdc.gov/healthyweight      [facility][note 3] home safety checklist: www.cdc.gov/steadi/patient.html      Agency on Aging: www.goea.[place].gov      Alcoholics anonymous (AA): www.aa.org      Physical Activity: www.anne.nih.gov/ib8nuqg      Tobacco use: www.quitwithusla.org

## (undated) DEVICE — MAT QUICK 40X30 FLOOR FLUID LF

## (undated) DEVICE — SUPPORT ULNA NERVE PROTECTOR

## (undated) DEVICE — CANISTER SUCTION 2 LTR

## (undated) DEVICE — GLOVE BIOGEL PI MICRO SZ 7

## (undated) DEVICE — WIRE GUIDE .035 150CM.

## (undated) DEVICE — GLOVE SURG BIOGEL LATEX SZ 7.5

## (undated) DEVICE — BLANKET UPPER BODY 78.7X29.9IN

## (undated) DEVICE — SYR ONLY LUER LOCK 20CC

## (undated) DEVICE — CATH URTRL OPEN END STR TIP 5F

## (undated) DEVICE — COVER SNAP KAP 26IN

## (undated) DEVICE — SOL IRR NACL .9% 3000ML

## (undated) DEVICE — GOWN B1 X-LG X-LONG

## (undated) DEVICE — Device